# Patient Record
Sex: MALE | Race: WHITE | NOT HISPANIC OR LATINO | Employment: OTHER | URBAN - NONMETROPOLITAN AREA
[De-identification: names, ages, dates, MRNs, and addresses within clinical notes are randomized per-mention and may not be internally consistent; named-entity substitution may affect disease eponyms.]

---

## 2017-03-03 ENCOUNTER — HOSPITAL ENCOUNTER (EMERGENCY)
Facility: HOSPITAL | Age: 67
Discharge: HOME | End: 2017-03-03
Attending: EMERGENCY MEDICINE | Admitting: EMERGENCY MEDICINE
Payer: MEDICARE

## 2017-03-03 ENCOUNTER — APPOINTMENT (EMERGENCY)
Dept: RADIOLOGY | Facility: HOSPITAL | Age: 67
End: 2017-03-03
Payer: MEDICARE

## 2017-03-03 VITALS
HEART RATE: 77 BPM | WEIGHT: 212 LBS | SYSTOLIC BLOOD PRESSURE: 162 MMHG | TEMPERATURE: 97 F | RESPIRATION RATE: 20 BRPM | OXYGEN SATURATION: 96 % | DIASTOLIC BLOOD PRESSURE: 87 MMHG

## 2017-03-03 DIAGNOSIS — M25.571 ACUTE RIGHT ANKLE PAIN: Primary | ICD-10-CM

## 2017-03-03 LAB — URATE SERPL-MCNC: 9.2 MG/DL (ref 4.2–8)

## 2017-03-03 PROCEDURE — 99284 EMERGENCY DEPT VISIT MOD MDM: CPT

## 2017-03-03 PROCEDURE — 36415 COLL VENOUS BLD VENIPUNCTURE: CPT | Performed by: EMERGENCY MEDICINE

## 2017-03-03 PROCEDURE — 73610 X-RAY EXAM OF ANKLE: CPT

## 2017-03-03 PROCEDURE — 84550 ASSAY OF BLOOD/URIC ACID: CPT | Performed by: EMERGENCY MEDICINE

## 2017-03-03 RX ORDER — GABAPENTIN 100 MG/1
600 CAPSULE ORAL DAILY
COMMUNITY

## 2017-03-03 RX ORDER — LISINOPRIL 10 MG/1
10 TABLET ORAL DAILY
COMMUNITY

## 2017-03-03 RX ORDER — NAPROXEN 250 MG/1
500 TABLET ORAL ONCE
Status: COMPLETED | OUTPATIENT
Start: 2017-03-03 | End: 2017-03-03

## 2017-03-03 RX ORDER — RANITIDINE 150 MG/1
150 CAPSULE ORAL DAILY PRN
COMMUNITY

## 2017-03-03 RX ORDER — LEVOTHYROXINE SODIUM 25 UG/1
1 CAPSULE ORAL DAILY
COMMUNITY

## 2017-03-03 RX ORDER — COLCHICINE 0.6 MG/1
0.6 TABLET ORAL DAILY
Qty: 30 TABLET | Refills: 0 | Status: SHIPPED | OUTPATIENT
Start: 2017-03-03 | End: 2020-08-27

## 2017-03-03 RX ADMIN — NAPROXEN 500 MG: 250 TABLET ORAL at 07:37

## 2017-08-09 ENCOUNTER — APPOINTMENT (OUTPATIENT)
Dept: PHYSICAL THERAPY | Facility: CLINIC | Age: 67
End: 2017-08-09
Payer: MEDICARE

## 2017-08-09 PROCEDURE — G8978 MOBILITY CURRENT STATUS: HCPCS

## 2017-08-09 PROCEDURE — 97140 MANUAL THERAPY 1/> REGIONS: CPT

## 2017-08-09 PROCEDURE — 97014 ELECTRIC STIMULATION THERAPY: CPT

## 2017-08-09 PROCEDURE — 97162 PT EVAL MOD COMPLEX 30 MIN: CPT

## 2017-08-09 PROCEDURE — G8979 MOBILITY GOAL STATUS: HCPCS

## 2017-08-09 PROCEDURE — 97110 THERAPEUTIC EXERCISES: CPT

## 2017-08-10 ENCOUNTER — APPOINTMENT (OUTPATIENT)
Dept: PHYSICAL THERAPY | Facility: CLINIC | Age: 67
End: 2017-08-10
Payer: MEDICARE

## 2017-08-10 PROCEDURE — 97140 MANUAL THERAPY 1/> REGIONS: CPT

## 2017-08-10 PROCEDURE — 97110 THERAPEUTIC EXERCISES: CPT

## 2017-08-10 PROCEDURE — 97014 ELECTRIC STIMULATION THERAPY: CPT

## 2017-08-16 ENCOUNTER — APPOINTMENT (OUTPATIENT)
Dept: PHYSICAL THERAPY | Facility: CLINIC | Age: 67
End: 2017-08-16
Payer: MEDICARE

## 2017-08-16 PROCEDURE — 97140 MANUAL THERAPY 1/> REGIONS: CPT

## 2017-08-16 PROCEDURE — 97110 THERAPEUTIC EXERCISES: CPT

## 2017-08-16 PROCEDURE — 97014 ELECTRIC STIMULATION THERAPY: CPT

## 2017-08-17 ENCOUNTER — APPOINTMENT (OUTPATIENT)
Dept: PHYSICAL THERAPY | Facility: CLINIC | Age: 67
End: 2017-08-17
Payer: MEDICARE

## 2017-08-17 PROCEDURE — 97140 MANUAL THERAPY 1/> REGIONS: CPT

## 2017-08-17 PROCEDURE — 97014 ELECTRIC STIMULATION THERAPY: CPT

## 2017-08-17 PROCEDURE — 97110 THERAPEUTIC EXERCISES: CPT

## 2017-08-23 ENCOUNTER — APPOINTMENT (OUTPATIENT)
Dept: PHYSICAL THERAPY | Facility: CLINIC | Age: 67
End: 2017-08-23
Payer: MEDICARE

## 2017-08-23 PROCEDURE — 97110 THERAPEUTIC EXERCISES: CPT

## 2017-08-23 PROCEDURE — 97140 MANUAL THERAPY 1/> REGIONS: CPT

## 2017-08-23 PROCEDURE — 97014 ELECTRIC STIMULATION THERAPY: CPT

## 2017-08-24 ENCOUNTER — APPOINTMENT (OUTPATIENT)
Dept: PHYSICAL THERAPY | Facility: CLINIC | Age: 67
End: 2017-08-24
Payer: MEDICARE

## 2017-08-24 PROCEDURE — 97110 THERAPEUTIC EXERCISES: CPT

## 2017-08-24 PROCEDURE — 97014 ELECTRIC STIMULATION THERAPY: CPT

## 2017-08-24 PROCEDURE — 97140 MANUAL THERAPY 1/> REGIONS: CPT

## 2017-08-28 ENCOUNTER — APPOINTMENT (OUTPATIENT)
Dept: PHYSICAL THERAPY | Facility: CLINIC | Age: 67
End: 2017-08-28
Payer: MEDICARE

## 2017-08-28 PROCEDURE — 97014 ELECTRIC STIMULATION THERAPY: CPT

## 2017-08-28 PROCEDURE — 97110 THERAPEUTIC EXERCISES: CPT

## 2017-08-28 PROCEDURE — 97140 MANUAL THERAPY 1/> REGIONS: CPT

## 2017-08-30 ENCOUNTER — APPOINTMENT (OUTPATIENT)
Dept: PHYSICAL THERAPY | Facility: CLINIC | Age: 67
End: 2017-08-30
Payer: MEDICARE

## 2017-08-31 ENCOUNTER — APPOINTMENT (OUTPATIENT)
Dept: PHYSICAL THERAPY | Facility: CLINIC | Age: 67
End: 2017-08-31
Payer: MEDICARE

## 2017-08-31 PROCEDURE — 97110 THERAPEUTIC EXERCISES: CPT

## 2017-08-31 PROCEDURE — 97014 ELECTRIC STIMULATION THERAPY: CPT

## 2017-08-31 PROCEDURE — 97140 MANUAL THERAPY 1/> REGIONS: CPT

## 2017-09-06 ENCOUNTER — APPOINTMENT (OUTPATIENT)
Dept: PHYSICAL THERAPY | Facility: CLINIC | Age: 67
End: 2017-09-06
Payer: MEDICARE

## 2017-09-06 PROCEDURE — 97140 MANUAL THERAPY 1/> REGIONS: CPT

## 2017-09-06 PROCEDURE — 97014 ELECTRIC STIMULATION THERAPY: CPT

## 2017-09-07 ENCOUNTER — APPOINTMENT (OUTPATIENT)
Dept: PHYSICAL THERAPY | Facility: CLINIC | Age: 67
End: 2017-09-07
Payer: MEDICARE

## 2017-09-07 PROCEDURE — 97014 ELECTRIC STIMULATION THERAPY: CPT

## 2017-09-07 PROCEDURE — G8980 MOBILITY D/C STATUS: HCPCS

## 2017-09-07 PROCEDURE — 97140 MANUAL THERAPY 1/> REGIONS: CPT

## 2017-09-07 PROCEDURE — 97110 THERAPEUTIC EXERCISES: CPT

## 2017-09-07 PROCEDURE — G8979 MOBILITY GOAL STATUS: HCPCS

## 2017-11-21 ENCOUNTER — APPOINTMENT (OUTPATIENT)
Dept: PHYSICAL THERAPY | Facility: CLINIC | Age: 67
End: 2017-11-21
Payer: MEDICARE

## 2017-11-21 PROCEDURE — G8991 OTHER PT/OT GOAL STATUS: HCPCS

## 2017-11-21 PROCEDURE — 97110 THERAPEUTIC EXERCISES: CPT

## 2017-11-21 PROCEDURE — 97535 SELF CARE MNGMENT TRAINING: CPT

## 2017-11-21 PROCEDURE — G8990 OTHER PT/OT CURRENT STATUS: HCPCS

## 2017-11-21 PROCEDURE — 97161 PT EVAL LOW COMPLEX 20 MIN: CPT

## 2017-11-21 PROCEDURE — 97140 MANUAL THERAPY 1/> REGIONS: CPT

## 2017-11-22 ENCOUNTER — APPOINTMENT (OUTPATIENT)
Dept: PHYSICAL THERAPY | Facility: CLINIC | Age: 67
End: 2017-11-22
Payer: MEDICARE

## 2017-11-22 PROCEDURE — 97110 THERAPEUTIC EXERCISES: CPT

## 2017-11-22 PROCEDURE — 97140 MANUAL THERAPY 1/> REGIONS: CPT

## 2017-11-27 ENCOUNTER — APPOINTMENT (OUTPATIENT)
Dept: PHYSICAL THERAPY | Facility: CLINIC | Age: 67
End: 2017-11-27
Payer: MEDICARE

## 2017-11-27 PROCEDURE — 97140 MANUAL THERAPY 1/> REGIONS: CPT

## 2017-11-27 PROCEDURE — 97014 ELECTRIC STIMULATION THERAPY: CPT

## 2017-11-27 PROCEDURE — 97150 GROUP THERAPEUTIC PROCEDURES: CPT

## 2018-09-05 ENCOUNTER — EVALUATION (OUTPATIENT)
Dept: PHYSICAL THERAPY | Facility: CLINIC | Age: 68
End: 2018-09-05
Payer: MEDICARE

## 2018-09-05 DIAGNOSIS — M72.2 PLANTAR FASCIAL FIBROMATOSIS: Primary | ICD-10-CM

## 2018-09-05 PROCEDURE — 97535 SELF CARE MNGMENT TRAINING: CPT | Performed by: PHYSICAL THERAPIST

## 2018-09-05 PROCEDURE — G8978 MOBILITY CURRENT STATUS: HCPCS | Performed by: PHYSICAL THERAPIST

## 2018-09-05 PROCEDURE — G8979 MOBILITY GOAL STATUS: HCPCS | Performed by: PHYSICAL THERAPIST

## 2018-09-05 PROCEDURE — 97140 MANUAL THERAPY 1/> REGIONS: CPT | Performed by: PHYSICAL THERAPIST

## 2018-09-05 PROCEDURE — 97530 THERAPEUTIC ACTIVITIES: CPT | Performed by: PHYSICAL THERAPIST

## 2018-09-05 PROCEDURE — 97161 PT EVAL LOW COMPLEX 20 MIN: CPT | Performed by: PHYSICAL THERAPIST

## 2018-09-05 NOTE — LETTER
2018    DO Mason [address] [facility]    Patient: Raquel Lewis   YOB: 1950   Date of Visit: 2018     Encounter Diagnosis     ICD-10-CM    1  Plantar fascial fibromatosis M72 2        Dear Dr Martina Alfaro:    Please review the attached Plan of Care from [facility] Chrin's recent visit  Please verify that you agree therapy should continue by signing the attached document and sending it back to our office  If you have any questions or concerns, please don't hesitate to call  Sincerely,    Mo Champagne, PT      Referring Provider:      I certify that I have read the below Plan of Care and certify the need for these services furnished under this plan of treatment while under my care  DO Gabriel Bush [address] [place] [address]  VIA Facsimile: [phone]          PT Evaluation     Today's date: 2018  Patient name: Rauqel Lewis  : 1950  MRN: [id number]  Referring provider: Sabas Eastman DO  Dx:   Encounter Diagnosis     ICD-10-CM    1  Plantar fascial fibromatosis M72 2                   Assessment  Impairments: abnormal gait, abnormal or restricted ROM, activity intolerance and pain with function    Assessment details: Raquel Lewis is a 70y o  year old male presenting to PT with pain, decreased range of motion, decreased strength, and decreased tolerance to activity  Signs and symptoms are consistent with referring diagnosis of bilateral plantar fasciitis  The existing impairments result in difficulty with all standing activity with characteristic first step pain  Pain worsens with increased time on his feet, but has begun to decrease since initiation of orthotics  Discussed possibility that flat footed gait to compensate for knee pain may have contributed to his pathology  [facility] would benefit from skilled PT services to address these issues and to maximize function  Home exercise provided and all questions answered   Thank you for the referral     Understanding of Dx/Px/POC: good   Prognosis: good    Goals  STG 2-4 weeks  Increase BLE strength 5-10#  Decrease pain to <5/10 with activity  Increase standing/walking tolerance to >30 minutes with out pain  Patient independent with HEP    LTG 6-8 weeks  Decrease pain to <2/10 with activity  Increase single leg stance >30 seconds  Increase standing/walking tolerance to >2-3 hours without exacerbation of symptoms        Plan  Planned modality interventions: cryotherapy  Planned therapy interventions: manual therapy, neuromuscular re-education, therapeutic activities and therapeutic exercise  Frequency: 3x week  Duration in weeks: 4        Subjective Evaluation    History of Present Illness  Mechanism of injury: Tabitha Sauer reports 6 months of worsening plantar pain with insidious onset  Pain is present equally in bilateral feet, but has been improving since he received new orthotics on   He has been compliant with exercises provided at time of initial MD consult  Quality of life: good    Pain  Current pain ratin  At best pain ratin  At worst pain ratin  Location: B plantar fascia  Quality: needle-like, sharp, tight and discomfort  Relieving factors: rest  Aggravating factors: walking, standing and stair climbing  Progression: improved    Treatments  Current treatment: physical therapy  Patient Goals  Patient goals for therapy: decreased pain          Objective     Tenderness   Left Ankle/Foot   Tenderness in the plantar fascia  Right Ankle/Foot   Tenderness in the plantar fascia       Active Range of Motion   Left Ankle/Foot   Dorsiflexion (ke): 4 degrees   Plantar flexion: 30 degrees     Right Ankle/Foot   Dorsiflexion (ke): 14 degrees   Plantar flexion: 35 degrees       Flowsheet Rows      Most Recent Value   PT/OT G-Codes   Current Score  50   Projected Score  63   FOTO information reviewed  Yes   Assessment Type  Evaluation   G code set  Mobility: Walking & Moving Around Mobility: Walking and Moving Around Current Status ()  CK   Mobility: Walking and Moving Around Goal Status ()  CJ        Precautions pending knee replacements    Specialty Daily Treatment Diary         Manual  9-5       IASTM plantar fascia 15'                                       Exercise Diary         Bike        Marbles 1x ea       ABC, abc 1x ea       Ankle pumps        [facility] wipers        Towel crunches 20x ea       Fitter Cw, CCW        TB PRE        Windlass stretch 30"x3                                                                                               Modalities

## 2018-09-05 NOTE — PROGRESS NOTES
PT Evaluation     Today's date: 2018  Patient name: Tashi Nichols  : 1950  MRN: [id number]  Referring provider: Millicent Kim DO  Dx:   Encounter Diagnosis     ICD-10-CM    1  Plantar fascial fibromatosis M72 2                   Assessment  Impairments: abnormal gait, abnormal or restricted ROM, activity intolerance and pain with function    Assessment details: Tashi Nichols is a 70y o  year old male presenting to PT with pain, decreased range of motion, decreased strength, and decreased tolerance to activity  Signs and symptoms are consistent with referring diagnosis of bilateral plantar fasciitis  The existing impairments result in difficulty with all standing activity with characteristic first step pain  Pain worsens with increased time on his feet, but has begun to decrease since initiation of orthotics  Discussed possibility that flat footed gait to compensate for knee pain may have contributed to his pathology   would benefit from skilled PT services to address these issues and to maximize function  Home exercise provided and all questions answered  Thank you for the referral     Understanding of Dx/Px/POC: good   Prognosis: good    Goals  STG 2-4 weeks  Increase BLE strength 5-10#  Decrease pain to <5/10 with activity  Increase standing/walking tolerance to >30 minutes with out pain  Patient independent with HEP    LTG 6-8 weeks  Decrease pain to <2/10 with activity  Increase single leg stance >30 seconds  Increase standing/walking tolerance to >2-3 hours without exacerbation of symptoms        Plan  Planned modality interventions: cryotherapy  Planned therapy interventions: manual therapy, neuromuscular re-education, therapeutic activities and therapeutic exercise  Frequency: 3x week  Duration in weeks: 4        Subjective Evaluation    History of Present Illness  Mechanism of injury:  reports 6 months of worsening plantar pain with insidious onset    Pain is present equally in bilateral feet, but has been improving since he received new orthotics on   He has been compliant with exercises provided at time of initial MD consult  Quality of life: good    Pain  Current pain ratin  At best pain ratin  At worst pain ratin  Location: B plantar fascia  Quality: needle-like, sharp, tight and discomfort  Relieving factors: rest  Aggravating factors: walking, standing and stair climbing  Progression: improved    Treatments  Current treatment: physical therapy  Patient Goals  Patient goals for therapy: decreased pain          Objective     Tenderness   Left Ankle/Foot   Tenderness in the plantar fascia  Right Ankle/Foot   Tenderness in the plantar fascia       Active Range of Motion   Left Ankle/Foot   Dorsiflexion (ke): 4 degrees   Plantar flexion: 30 degrees     Right Ankle/Foot   Dorsiflexion (ke): 14 degrees   Plantar flexion: 35 degrees       Flowsheet Rows      Most Recent Value   PT/OT G-Codes   Current Score  50   Projected Score  63   FOTO information reviewed  Yes   Assessment Type  Evaluation   G code set  Mobility: Walking & Moving Around   Mobility: Walking and Moving Around Current Status ()  CK   Mobility: Walking and Moving Around Goal Status ()  CJ        Precautions pending knee replacements    Specialty Daily Treatment Diary         Manual  9-5       IASTM plantar fascia 15'                                       Exercise Diary         Bike        Marbles 1x ea       ABC, abc 1x ea       Ankle pumps        Windshield wipers        Towel crunches 20x ea       Fitter Cw, CCW        TB PRE        Windlass stretch 30"x3                                                                                               Modalities

## 2018-09-06 ENCOUNTER — OFFICE VISIT (OUTPATIENT)
Dept: PHYSICAL THERAPY | Facility: CLINIC | Age: 68
End: 2018-09-06
Payer: MEDICARE

## 2018-09-06 ENCOUNTER — TRANSCRIBE ORDERS (OUTPATIENT)
Dept: PHYSICAL THERAPY | Facility: CLINIC | Age: 68
End: 2018-09-06

## 2018-09-06 DIAGNOSIS — M72.2 PLANTAR FASCIAL FIBROMATOSIS: Primary | ICD-10-CM

## 2018-09-06 NOTE — PROGRESS NOTES
Daily Note     Today's date: 2018  Patient name: Carmella Villarreal  : 1950  MRN: [id number]  Referring provider: Marika Pittman DO  Dx:            A user error has taken place: encounter opened in error, closed for administrative reasons

## 2018-09-07 ENCOUNTER — OFFICE VISIT (OUTPATIENT)
Dept: PHYSICAL THERAPY | Facility: CLINIC | Age: 68
End: 2018-09-07
Payer: MEDICARE

## 2018-09-07 ENCOUNTER — APPOINTMENT (OUTPATIENT)
Dept: PHYSICAL THERAPY | Facility: CLINIC | Age: 68
End: 2018-09-07
Payer: MEDICARE

## 2018-09-07 DIAGNOSIS — M72.2 PLANTAR FASCIAL FIBROMATOSIS: Primary | ICD-10-CM

## 2018-09-07 PROCEDURE — 97110 THERAPEUTIC EXERCISES: CPT

## 2018-09-07 PROCEDURE — 97112 NEUROMUSCULAR REEDUCATION: CPT

## 2018-09-07 PROCEDURE — 97140 MANUAL THERAPY 1/> REGIONS: CPT

## 2018-09-07 NOTE — PROGRESS NOTES
Daily Note     Today's date: 2018  Patient name: Shruti Ugarte  : 1950  MRN: [id number]  Referring provider: Darin Baez DO  Dx:   Encounter Diagnosis     ICD-10-CM    1  Plantar fascial fibromatosis M72 2                   Subjective: Patient reports his plantar area of feet are sore today  He reports orthotics in his shoes  Objective: See treatment diary below  Assessment: Tolerated treatment well  Patient had relief with IASTM and cross friction MT  He will benefit from continued services to decrease plantar tightness and increase ROM  Plan: Continue per plan of care       Precautions pending knee replacements     Specialty Daily Treatment Diary            Manual  9-5  9-7         IASTM plantar fascia 15'  15'                                                                 Exercise Diary              Bike    L 5 10'         Marbles 1x ea  1 x ea         ABC, abc 1x ea  1x ea30"x3         Ankle pumps             Windshield wipers             Towel crunches 20x ea  20x ea         Fitter Cw, CCW             TB PRE             Windlass stretch 30"x3  30"x3                                                                                                                                                                   Modalities

## 2018-09-10 ENCOUNTER — OFFICE VISIT (OUTPATIENT)
Dept: PHYSICAL THERAPY | Facility: CLINIC | Age: 68
End: 2018-09-10
Payer: MEDICARE

## 2018-09-10 DIAGNOSIS — M72.2 PLANTAR FASCIAL FIBROMATOSIS: Primary | ICD-10-CM

## 2018-09-10 PROCEDURE — 97140 MANUAL THERAPY 1/> REGIONS: CPT

## 2018-09-10 PROCEDURE — 97110 THERAPEUTIC EXERCISES: CPT

## 2018-09-10 NOTE — PROGRESS NOTES
Daily Note     Today's date: 9/10/2018  Patient name: Colleen Huang  : 1950  MRN: [id number]  Referring provider: Shira Reeder DO  Dx:   Encounter Diagnosis     ICD-10-CM    1  Plantar fascial fibromatosis M72 2                   Subjective: Patient reports compliance with the HEP/stretching program  He continues to have morning pain though the intensity is decreased  Objective: See treatment diary below      Assessment: Tolerated treatment well  Patient exhibited good technique with therapeutic exercises      Plan: Continue per plan of care       Precautions pending knee replacements     Specialty Daily Treatment Diary            Manual  9-5  9-7  9-10       IAS plantar fascia 15'  15'  15'                                                               Exercise Diary              Bike    L 5 10'  L 5 10'       Marbles 1x ea  1 x ea  1X ea       ABC, abc 1x ea  1x ea30"x3  1X ea 30" X3       Ankle pumps             Windshield wipers             Towel crunches 20x ea  20x ea  20X       Fitter Cw, CCW             TB PRE             Windlass stretch 30"x3  30"x3  30" X3                                                                                                                                                                 Modalities

## 2018-09-12 ENCOUNTER — OFFICE VISIT (OUTPATIENT)
Dept: PHYSICAL THERAPY | Facility: CLINIC | Age: 68
End: 2018-09-12
Payer: MEDICARE

## 2018-09-12 DIAGNOSIS — M72.2 PLANTAR FASCIAL FIBROMATOSIS: Primary | ICD-10-CM

## 2018-09-12 PROCEDURE — 97112 NEUROMUSCULAR REEDUCATION: CPT

## 2018-09-12 PROCEDURE — 97140 MANUAL THERAPY 1/> REGIONS: CPT

## 2018-09-12 NOTE — PROGRESS NOTES
Daily Note     Today's date: 2018  Patient name: Mel Ram  : 1950  MRN: [id number]  Referring provider: Ken Medrano DO  Dx:   Encounter Diagnosis     ICD-10-CM    1  Plantar fascial fibromatosis M72 2                   Subjective: Patient reports his pain and tightness in his B Plantar areas have decreased  He is compliant with HEP and is continuing to wear orthotics in his shoes  His feet get "tired" on days he isn't at therapy  Objective: See treatment diary below  Assessment: Tolerated treatment well  Patient has relief with Cross friction and IASTM  He will benefit from continued services to decrease plantar fasciitis symptoms and plain in B feet  Plan: Continue per plan of care       Precautions: pending knee replacements     Specialty Daily Treatment Diary            Manual  9-5  9-7  9-10  9-12     IASTM plantar fascia 13'  15'  15'  15'     Cross friction B plantar Fascia        5'                                               Exercise Diary              Bike    L 5 10'  L 5 10'  L 5 10'     Marbles 1x ea  1 x ea  1X ea  1x ea     ABC, abc 1x ea  1x ea30"x3  1X ea   1x ea     Ankle pumps             [facility] wipers             Towel crunches 20x ea  20x ea  20X 20x     Fitter Cw, CCW             TB PRE             Windlass stretch 30"x3  30"x3  30" X3  30"x3                                                                                                                                                               Modalities

## 2018-09-14 ENCOUNTER — OFFICE VISIT (OUTPATIENT)
Dept: PHYSICAL THERAPY | Facility: CLINIC | Age: 68
End: 2018-09-14
Payer: MEDICARE

## 2018-09-14 DIAGNOSIS — M72.2 PLANTAR FASCIAL FIBROMATOSIS: Primary | ICD-10-CM

## 2018-09-14 PROCEDURE — 97112 NEUROMUSCULAR REEDUCATION: CPT

## 2018-09-14 PROCEDURE — 97140 MANUAL THERAPY 1/> REGIONS: CPT

## 2018-09-14 NOTE — PROGRESS NOTES
Daily Note     Today's date: 2018  Patient name: Usama Colbert  : 1950  MRN: [id number]  Referring provider: Dominique Smith DO  Dx:   Encounter Diagnosis     ICD-10-CM    1  Plantar fascial fibromatosis M72 2                   Subjective: Patient reports less tightness and soreness in his B plantar fascia  Objective: See treatment diary below  Assessment: Tolerated treatment well  Patient demonstrates relief with IASTM and cross friction MT to B plantar area  Plan: Continue per plan of care       Precautions: pending knee replacements     Specialty Daily Treatment Diary            Manual  9-5  9-7  9-10  9-12  9-14   IASTM plantar fascia 13'  15'  15'  15'  15'   Cross friction B plantar Fascia        5'  5'                                             Exercise Diary              Bike    L 5 8'  L 5 10'  L 5 10'  L 5 10'   Marbles 1x ea  1 x ea  1X ea  1x ea  2x ea   ABC, abc 1x ea  1x ea30"x3  1X ea   1x ea     Ankle pumps             Windield wipers             Towel crunches 20x ea  20x ea  20X 20x  20x   Fitter Cw, CCW             TB PRE             Windlass stretch 30"x3  30"x3  30" X3  30"x3  30"x3                                                                                                                                                             Modalities

## 2018-09-17 ENCOUNTER — OFFICE VISIT (OUTPATIENT)
Dept: PHYSICAL THERAPY | Facility: CLINIC | Age: 68
End: 2018-09-17
Payer: MEDICARE

## 2018-09-17 DIAGNOSIS — M72.2 PLANTAR FASCIAL FIBROMATOSIS: Primary | ICD-10-CM

## 2018-09-17 PROCEDURE — 97112 NEUROMUSCULAR REEDUCATION: CPT

## 2018-09-17 PROCEDURE — 97140 MANUAL THERAPY 1/> REGIONS: CPT

## 2018-09-17 PROCEDURE — 97110 THERAPEUTIC EXERCISES: CPT

## 2018-09-17 NOTE — PROGRESS NOTES
Daily Note     Today's date: 2018  Patient name: Leland Chang  : 1950  MRN: [id number]  Referring provider: Gisselle Garcia DO  Dx:   Encounter Diagnosis     ICD-10-CM    1  Plantar fascial fibromatosis M72 2                   Subjective: Patient reports that his B feet are "tired" by the end of the day and may be caused from wearing his orthotics  His plantar fasciitis has decreased since initial visit  Objective: See treatment diary below  Incorporated tandem/SLS into program today  Assessment: Tolerated treatment well  Patient has relief with IASTM and MT cross friction  Plan: Continue per plan of care       Precautions: pending knee replacements     Specialty Daily Treatment Diary            Manual  -17  9-7  9-10  9-12  9-14   IASTM plantar fascia 13'  15'  15'  15'  15'   Cross friction B plantar Fascia  5'      5'  5'                                             Exercise Diary              Bike  L 5 8'  L 5 10'  L 5 10'  L 5 10'  L 5 10'   Marbles 2x ea  1 x ea  1X ea  1x ea  2x ea   ABC, abc 1x ea  1x ea  1X ea   1x ea     Ankle pumps            [facility] wipers            Towel crunches 20x ea  20x ea  20X 20x  20x   Fitter Cw, CCW            TB PRE            Windlass stretch 30"x3  30"x3  30" X3  30"x3  30"x3   Tandem/SLS airex 30"x3                                                                                                                                                       Modalities

## 2018-09-19 ENCOUNTER — OFFICE VISIT (OUTPATIENT)
Dept: PHYSICAL THERAPY | Facility: CLINIC | Age: 68
End: 2018-09-19
Payer: MEDICARE

## 2018-09-19 DIAGNOSIS — M72.2 PLANTAR FASCIAL FIBROMATOSIS: Primary | ICD-10-CM

## 2018-09-19 PROCEDURE — 97112 NEUROMUSCULAR REEDUCATION: CPT

## 2018-09-19 PROCEDURE — 97110 THERAPEUTIC EXERCISES: CPT

## 2018-09-19 PROCEDURE — 97140 MANUAL THERAPY 1/> REGIONS: CPT

## 2018-09-19 NOTE — PROGRESS NOTES
Daily Note     Today's date: 2018  Patient name: Lashay Pathak  : 1950  MRN: [id number]  Referring provider: Claudetta Felix, DO  Dx:   Encounter Diagnosis     ICD-10-CM    1  Plantar fascial fibromatosis M72 2                   Subjective: Patient has no complaints, his B plantar fascia does not have pain  Objective: See treatment diary below  Assessment: Tolerated treatment well  Patient has relief with IASTM and cross friction MT  Plan: Potential discharge next visit      Precautions: pending knee replacements     Specialty Daily Treatment Diary            Manual    9-10  9-12  9-14   IASTM plantar fascia 15' 15'  15'  15'  15'   Cross friction B plantar Fascia  5' 5'    5'  5'                                          Exercise Diary             Bike  L 5 8' L 5 10'  L 5 10'  L 5 10'  L 5 10'   Marbles 2x ea 2xea  1X ea  1x ea  2x ea   ABC, abc 1x ea 1x ea  1X ea   1x ea     Ankle pumps            Windshield wipers            Towel crunches 20x ea 20x ea  20X 20x  20x   Fitter Cw, CCW            TB PRE            Windlass stretch 30"x3 30"x3  30" X3  30"x3  30"x3   Tandem/SLS airex 30"x3 30"x3                                                                                                                                             Modalities

## 2018-09-21 ENCOUNTER — OFFICE VISIT (OUTPATIENT)
Dept: PHYSICAL THERAPY | Facility: CLINIC | Age: 68
End: 2018-09-21
Payer: MEDICARE

## 2018-09-21 DIAGNOSIS — M72.2 PLANTAR FASCIAL FIBROMATOSIS: Primary | ICD-10-CM

## 2018-09-21 PROCEDURE — 97535 SELF CARE MNGMENT TRAINING: CPT

## 2018-09-21 PROCEDURE — 97110 THERAPEUTIC EXERCISES: CPT

## 2018-09-21 NOTE — PROGRESS NOTES
Daily Note     Today's date: 2018  Patient name: Kimberly Arzola  : 1950  MRN: [id number]  Referring provider: Redd Landeros DO  Dx:   Encounter Diagnosis     ICD-10-CM    1  Plantar fascial fibromatosis M72 2                   Subjective: Patient reports no increase in pain  He feels he has made good progress, his B plantar areas have no symptoms  Objective: See treatment diary below  Assessment: Tolerated treatment well  Patient had no change in symptoms  Patient to be discharged after today  Plan: Patient to be discharged today as per PT      Precautions: pending knee replacements     Specialty Daily Treatment Diary            Manual  14   IASTM plantar fascia 15' 15' declined  15'  15'   Cross friction B plantar Fascia  5' 5' declined  5'  5'                                          Exercise Diary             Bike  L 5 8' L 5 10' L 5 15'  L 5 10'  L 5 10'   Marbles 2x ea 2xea   1x ea  2x ea   ABC, abc 1x ea 1x ea   1x ea     Ankle pumps            Windield wipers            Towel crunches 20x ea 20x ea  20x  20x   Fitter Cw, CCW            TB PRE            Windlass stretch 30"x3 30"x3   30"x3  30"x3   Tandem/SLS airex 30"x3 30"x3                                                                                                                                                  Modalities

## 2018-09-25 NOTE — PROGRESS NOTES
PT Discharge    Today's date: 2018  Patient name: Meri Cochran  : 1950  MRN: [id number]  Referring provider: Aguila Gibson DO  Dx:   Encounter Diagnosis     ICD-10-CM    1  Plantar fascial fibromatosis M72 2        Start Time: 1000  Stop Time: 5508  Total time in clinic (min): 25 minutes      DC due to full resolution of symptoms, no further skilled PT indicated  Assessment  Impairments: abnormal gait, abnormal or restricted ROM, activity intolerance and pain with function    Assessment details: Chris Mancera has progressed well towards all goals  He now reports no pain in his feet through a combination of orthotics and physical therapy  At this time all goals met, no further PT indicated  Understanding of Dx/Px/POC: good   Prognosis: good    Goals  STG 2-4 weeks  Increase BLE strength 5-10# - met  Decrease pain to <5/10 with activity - met  Increase standing/walking tolerance to >30 minutes with out pain - met  Patient independent with HEP - met    LTG 6-8 weeks  Decrease pain to <2/10 with activity - met  Increase single leg stance >30 seconds - met  Increase standing/walking tolerance to >2-3 hours without exacerbation of symptoms - met        Plan  Planned modality interventions: cryotherapy        Subjective Evaluation    History of Present Illness  Mechanism of injury: Chris Mancera reports resolution of all symptoms  Quality of life: good    Pain  Current pain ratin  At best pain ratin  At worst pain rating: 3  Location: B plantar fascia  Quality: tight and discomfort  Relieving factors: rest  Aggravating factors: walking, standing and stair climbing  Progression: improved    Treatments  Current treatment: physical therapy        Objective     Tenderness   Left Ankle/Foot   Tenderness in the plantar fascia  Right Ankle/Foot   Tenderness in the plantar fascia       Active Range of Motion   Left Ankle/Foot   Dorsiflexion (ke): 4 degrees   Plantar flexion: 30 degrees     Right Ankle/Foot Dorsiflexion (ke): 14 degrees   Plantar flexion: 35 degrees       Flowsheet Rows      Most Recent Value   PT/OT G-Codes   Current Score  73   Projected Score  63        Precautions pending knee replacements    Specialty Daily Treatment Diary         Manual  9-5       IASTM plantar fascia 15'                                       Exercise Diary         Bike        Marbles 1x ea       ABC, abc 1x ea       Ankle pumps        Windshield wipers        Towel crunches 20x ea       Fitter Cw, CCW        TB PRE        Windlass stretch 30"x3                                                                                               Modalities

## 2018-10-29 ENCOUNTER — ANESTHESIA EVENT (OUTPATIENT)
Dept: PERIOP | Facility: HOSPITAL | Age: 68
DRG: 470 | End: 2018-10-29
Payer: MEDICARE

## 2018-10-29 RX ORDER — SODIUM CHLORIDE 9 MG/ML
125 INJECTION, SOLUTION INTRAVENOUS CONTINUOUS
Status: CANCELLED | OUTPATIENT
Start: 2018-11-06

## 2018-10-30 ENCOUNTER — HOSPITAL ENCOUNTER (OUTPATIENT)
Dept: RADIOLOGY | Facility: HOSPITAL | Age: 68
Discharge: HOME | End: 2018-10-30
Attending: ORTHOPAEDIC SURGERY
Payer: MEDICARE

## 2018-10-30 ENCOUNTER — TRANSCRIBE ORDERS (OUTPATIENT)
Dept: ADMINISTRATIVE | Facility: HOSPITAL | Age: 68
End: 2018-10-30

## 2018-10-30 ENCOUNTER — APPOINTMENT (OUTPATIENT)
Dept: PREADMISSION TESTING | Facility: HOSPITAL | Age: 68
End: 2018-10-30
Payer: MEDICARE

## 2018-10-30 ENCOUNTER — HOSPITAL ENCOUNTER (OUTPATIENT)
Dept: NON INVASIVE DIAGNOSTICS | Facility: HOSPITAL | Age: 68
Discharge: HOME | End: 2018-10-30
Attending: ORTHOPAEDIC SURGERY
Payer: MEDICARE

## 2018-10-30 ENCOUNTER — APPOINTMENT (OUTPATIENT)
Dept: LAB | Facility: HOSPITAL | Age: 68
End: 2018-10-30
Attending: ORTHOPAEDIC SURGERY
Payer: MEDICARE

## 2018-10-30 DIAGNOSIS — M17.12 OSTEOARTHRITIS OF LEFT KNEE, UNSPECIFIED OSTEOARTHRITIS TYPE: Primary | ICD-10-CM

## 2018-10-30 DIAGNOSIS — Z01.818 PREOP EXAMINATION: ICD-10-CM

## 2018-10-30 DIAGNOSIS — M17.12 OSTEOARTHRITIS OF LEFT KNEE, UNSPECIFIED OSTEOARTHRITIS TYPE: ICD-10-CM

## 2018-10-30 LAB
ALBUMIN SERPL BCP-MCNC: 3.9 G/DL (ref 3.5–5)
ALP SERPL-CCNC: 59 U/L (ref 46–116)
ALT SERPL W P-5'-P-CCNC: 24 U/L (ref 12–78)
ANION GAP SERPL CALCULATED.3IONS-SCNC: 10 MMOL/L (ref 4–13)
AST SERPL W P-5'-P-CCNC: 20 U/L (ref 5–45)
ATRIAL RATE: 63 BPM
BACTERIA UR QL AUTO: ABNORMAL /HPF
BASOPHILS # BLD AUTO: 0.07 THOUSANDS/ΜL (ref 0–0.1)
BASOPHILS NFR BLD AUTO: 1 % (ref 0–1)
BILIRUB SERPL-MCNC: 0.75 MG/DL (ref 0.2–1)
BILIRUB UR QL STRIP: NEGATIVE
BUN SERPL-MCNC: 11 MG/DL (ref 5–25)
CALCIUM SERPL-MCNC: 9.1 MG/DL (ref 8.3–10.1)
CHLORIDE SERPL-SCNC: 100 MMOL/L (ref 100–108)
CLARITY UR: CLEAR
CO2 SERPL-SCNC: 28 MMOL/L (ref 21–32)
COLOR UR: YELLOW
CREAT SERPL-MCNC: 1.04 MG/DL (ref 0.6–1.3)
EOSINOPHIL # BLD AUTO: 0.15 THOUSAND/ΜL (ref 0–0.61)
EOSINOPHIL NFR BLD AUTO: 2 % (ref 0–6)
ERYTHROCYTE [DISTWIDTH] IN BLOOD BY AUTOMATED COUNT: 12.4 % (ref 11.6–15.1)
GFR SERPL CREATININE-BSD FRML MDRD: 73 ML/MIN/1.73SQ M
GLUCOSE SERPL-MCNC: 95 MG/DL (ref 65–140)
GLUCOSE UR STRIP-MCNC: NEGATIVE MG/DL
HCT VFR BLD AUTO: 48.3 % (ref 36.5–49.3)
HGB BLD-MCNC: 16.3 G/DL (ref 12–17)
HGB UR QL STRIP.AUTO: ABNORMAL
IMM GRANULOCYTES # BLD AUTO: 0.03 THOUSAND/UL (ref 0–0.2)
IMM GRANULOCYTES NFR BLD AUTO: 0 % (ref 0–2)
KETONES UR STRIP-MCNC: NEGATIVE MG/DL
LEUKOCYTE ESTERASE UR QL STRIP: NEGATIVE
LYMPHOCYTES # BLD AUTO: 1.93 THOUSANDS/ΜL (ref 0.6–4.47)
LYMPHOCYTES NFR BLD AUTO: 25 % (ref 14–44)
MCH RBC QN AUTO: 30.5 PG (ref 26.8–34.3)
MCHC RBC AUTO-ENTMCNC: 33.7 G/DL (ref 31.4–37.4)
MCV RBC AUTO: 90 FL (ref 82–98)
MONOCYTES # BLD AUTO: 0.8 THOUSAND/ΜL (ref 0.17–1.22)
MONOCYTES NFR BLD AUTO: 10 % (ref 4–12)
NEUTROPHILS # BLD AUTO: 4.89 THOUSANDS/ΜL (ref 1.85–7.62)
NEUTS SEG NFR BLD AUTO: 62 % (ref 43–75)
NITRITE UR QL STRIP: NEGATIVE
NON-SQ EPI CELLS URNS QL MICRO: ABNORMAL /HPF
NRBC BLD AUTO-RTO: 0 /100 WBCS
P AXIS: 75 DEGREES
PH UR STRIP.AUTO: 6 [PH] (ref 4.5–8)
PLATELET # BLD AUTO: 215 THOUSANDS/UL (ref 149–390)
PMV BLD AUTO: 10.5 FL (ref 8.9–12.7)
POTASSIUM SERPL-SCNC: 3.8 MMOL/L (ref 3.5–5.3)
PR INTERVAL: 170 MS
PROT SERPL-MCNC: 7.6 G/DL (ref 6.4–8.2)
PROT UR STRIP-MCNC: NEGATIVE MG/DL
QRS AXIS: 37 DEGREES
QRSD INTERVAL: 74 MS
QT INTERVAL: 406 MS
QTC INTERVAL: 415 MS
RBC # BLD AUTO: 5.35 MILLION/UL (ref 3.88–5.62)
RBC #/AREA URNS AUTO: ABNORMAL /HPF
SODIUM SERPL-SCNC: 138 MMOL/L (ref 136–145)
SP GR UR STRIP.AUTO: 1.02 (ref 1–1.03)
T WAVE AXIS: 50 DEGREES
UROBILINOGEN UR QL STRIP.AUTO: 0.2 E.U./DL
VENTRICULAR RATE: 63 BPM
WBC # BLD AUTO: 7.87 THOUSAND/UL (ref 4.31–10.16)
WBC #/AREA URNS AUTO: ABNORMAL /HPF

## 2018-10-30 PROCEDURE — 80053 COMPREHEN METABOLIC PANEL: CPT

## 2018-10-30 PROCEDURE — 93010 ELECTROCARDIOGRAM REPORT: CPT | Performed by: INTERNAL MEDICINE

## 2018-10-30 PROCEDURE — 36415 COLL VENOUS BLD VENIPUNCTURE: CPT

## 2018-10-30 PROCEDURE — 81001 URINALYSIS AUTO W/SCOPE: CPT | Performed by: ORTHOPAEDIC SURGERY

## 2018-10-30 PROCEDURE — 85025 COMPLETE CBC W/AUTO DIFF WBC: CPT

## 2018-10-30 PROCEDURE — 71046 X-RAY EXAM CHEST 2 VIEWS: CPT

## 2018-10-30 PROCEDURE — 93005 ELECTROCARDIOGRAM TRACING: CPT

## 2018-10-30 RX ORDER — ALLOPURINOL 100 MG/1
TABLET ORAL
COMMUNITY
Start: 2018-09-24

## 2018-10-30 NOTE — ANESTHESIA PREPROCEDURE EVALUATION
Review of Systems/Medical History  Patient summary reviewed  Chart reviewed  No history of anesthetic complications     Cardiovascular  Hypertension controlled,    Pulmonary  Negative pulmonary ROS        GI/Hepatic    GERD well controlled,             Endo/Other  History of thyroid disease , hypothyroidism,      GYN  Negative gynecology ROS          Hematology  Negative hematology ROS      Musculoskeletal  Back pain (multilevel lumbar fusion 2015) , lumbar pain, Gout,   Arthritis     Neurology  Negative neurology ROS      Psychology   Negative psychology ROS              Physical Exam    Airway    Mallampati score: II  TM Distance: >3 FB  Neck ROM: full     Dental   upper dentures,     Cardiovascular  Rhythm: regular, Rate: normal,     Pulmonary  Pulmonary exam normal Breath sounds clear to auscultation,     Other Findings        Anesthesia Plan  ASA Score- 2     Anesthesia Type- general, spinal and regional with ASA Monitors  Additional Monitors:   Airway Plan:     Comment: Adductor canal block  Discussed possibility of trying spinal, but he is prepared to have GA  Shaun Blackwell Plan Factors-Patient not instructed to abstain from smoking on day of procedure  Patient did not smoke on day of surgery  Induction- intravenous  Postoperative Plan- Plan for postoperative opioid use  Informed Consent- Anesthetic plan and risks discussed with patient

## 2018-10-30 NOTE — PRE-PROCEDURE INSTRUCTIONS
Pre-Surgery Instructions:   Medication Instructions    allopurinol (ZYLOPRIM) 100 mg tablet Patient was instructed by Physician and understands   colchicine (COLCRYS) 0 6 mg tablet Patient was instructed by Physician and understands   gabapentin (NEURONTIN) 100 mg capsule Patient was instructed by Physician and understands   Levothyroxine Sodium 25 MCG CAPS Patient was instructed by Physician and understands   lisinopril (ZESTRIL) 10 mg tablet Patient was instructed by Physician and understands   ranitidine (ZANTAC) 150 MG capsule Patient was instructed by Physician and understands  Seen by Dr Sim Carolina  Patient instructed to take Levothyroxine morning of surgery with sip of water    Instructed re: chlorhexidine showers per hospital policy

## 2018-11-01 ENCOUNTER — APPOINTMENT (OUTPATIENT)
Dept: LAB | Facility: MEDICAL CENTER | Age: 68
End: 2018-11-01
Payer: MEDICARE

## 2018-11-01 ENCOUNTER — TRANSCRIBE ORDERS (OUTPATIENT)
Dept: LAB | Facility: MEDICAL CENTER | Age: 68
End: 2018-11-01

## 2018-11-01 DIAGNOSIS — Z01.89 ENCOUNTER FOR OTHER SPECIFIED SPECIAL EXAMINATIONS: ICD-10-CM

## 2018-11-01 DIAGNOSIS — Z01.818 PRE-OP TESTING: ICD-10-CM

## 2018-11-01 DIAGNOSIS — M17.12 OSTEOARTHRITIS OF LEFT KNEE, UNSPECIFIED OSTEOARTHRITIS TYPE: Primary | ICD-10-CM

## 2018-11-01 DIAGNOSIS — M17.12 OSTEOARTHRITIS OF LEFT KNEE, UNSPECIFIED OSTEOARTHRITIS TYPE: ICD-10-CM

## 2018-11-01 PROCEDURE — 36415 COLL VENOUS BLD VENIPUNCTURE: CPT

## 2018-11-06 ENCOUNTER — ANESTHESIA (OUTPATIENT)
Dept: PERIOP | Facility: HOSPITAL | Age: 68
DRG: 470 | End: 2018-11-06
Payer: MEDICARE

## 2018-11-06 ENCOUNTER — APPOINTMENT (OUTPATIENT)
Dept: RADIOLOGY | Facility: HOSPITAL | Age: 68
DRG: 470 | End: 2018-11-06
Payer: MEDICARE

## 2018-11-06 ENCOUNTER — HOSPITAL ENCOUNTER (INPATIENT)
Facility: HOSPITAL | Age: 68
LOS: 1 days | Discharge: HOME HEALTH | DRG: 470 | End: 2018-11-08
Attending: ORTHOPAEDIC SURGERY | Admitting: ORTHOPAEDIC SURGERY
Payer: MEDICARE

## 2018-11-06 DIAGNOSIS — M17.12 PRIMARY OSTEOARTHRITIS OF LEFT KNEE: Primary | ICD-10-CM

## 2018-11-06 LAB
ABO GROUP BLD: NORMAL
BLD GP AB SCN SERPL QL: NEGATIVE
RH BLD: POSITIVE
SPECIMEN EXPIRATION DATE: NORMAL

## 2018-11-06 PROCEDURE — 97530 THERAPEUTIC ACTIVITIES: CPT

## 2018-11-06 PROCEDURE — 73560 X-RAY EXAM OF KNEE 1 OR 2: CPT

## 2018-11-06 PROCEDURE — 97163 PT EVAL HIGH COMPLEX 45 MIN: CPT

## 2018-11-06 PROCEDURE — C1776 JOINT DEVICE (IMPLANTABLE): HCPCS | Performed by: ORTHOPAEDIC SURGERY

## 2018-11-06 PROCEDURE — 86900 BLOOD TYPING SEROLOGIC ABO: CPT | Performed by: ORTHOPAEDIC SURGERY

## 2018-11-06 PROCEDURE — 0SRD0J9 REPLACEMENT OF LEFT KNEE JOINT WITH SYNTHETIC SUBSTITUTE, CEMENTED, OPEN APPROACH: ICD-10-PCS | Performed by: ORTHOPAEDIC SURGERY

## 2018-11-06 PROCEDURE — 86850 RBC ANTIBODY SCREEN: CPT | Performed by: ORTHOPAEDIC SURGERY

## 2018-11-06 PROCEDURE — C1713 ANCHOR/SCREW BN/BN,TIS/BN: HCPCS | Performed by: ORTHOPAEDIC SURGERY

## 2018-11-06 PROCEDURE — G8979 MOBILITY GOAL STATUS: HCPCS

## 2018-11-06 PROCEDURE — 86901 BLOOD TYPING SEROLOGIC RH(D): CPT | Performed by: ORTHOPAEDIC SURGERY

## 2018-11-06 PROCEDURE — G8978 MOBILITY CURRENT STATUS: HCPCS

## 2018-11-06 DEVICE — SMARTSET HIGH PERFORMANCE MV MEDIUM VISCOSITY BONE CEMENT 40G
Type: IMPLANTABLE DEVICE | Site: KNEE | Status: FUNCTIONAL
Brand: SMARTSET

## 2018-11-06 DEVICE — ATTUNE PATELLA MEDIALIZED ANATOMIC 35MM CEMENTED AOX
Type: IMPLANTABLE DEVICE | Site: PATELLA | Status: FUNCTIONAL
Brand: ATTUNE

## 2018-11-06 DEVICE — ATTUNE KNEE SYSTEM TIBIAL BASE ROTATING PLATFORM SIZE 7 CEMENTED
Type: IMPLANTABLE DEVICE | Site: KNEE | Status: FUNCTIONAL
Brand: ATTUNE

## 2018-11-06 DEVICE — ATTUNE KNEE SYSTEM FEMORAL POSTERIOR STABILIZED SIZE 8 LEFT CEMENTED
Type: IMPLANTABLE DEVICE | Site: KNEE | Status: FUNCTIONAL
Brand: ATTUNE

## 2018-11-06 DEVICE — ATTUNE KNEE SYSTEM TIBIAL INSERT ROTATING PLATFORM POSTERIOR STABILIZED 8 6MM AOX
Type: IMPLANTABLE DEVICE | Site: KNEE | Status: FUNCTIONAL
Brand: ATTUNE

## 2018-11-06 RX ORDER — LISINOPRIL 10 MG/1
10 TABLET ORAL DAILY
Status: DISCONTINUED | OUTPATIENT
Start: 2018-11-07 | End: 2018-11-07

## 2018-11-06 RX ORDER — ROPIVACAINE HYDROCHLORIDE 5 MG/ML
INJECTION, SOLUTION EPIDURAL; INFILTRATION; PERINEURAL AS NEEDED
Status: DISCONTINUED | OUTPATIENT
Start: 2018-11-06 | End: 2018-11-06

## 2018-11-06 RX ORDER — MIDAZOLAM HYDROCHLORIDE 2 MG/ML
SYRUP ORAL AS NEEDED
Status: DISCONTINUED | OUTPATIENT
Start: 2018-11-06 | End: 2018-11-06

## 2018-11-06 RX ORDER — SODIUM CHLORIDE, SODIUM LACTATE, POTASSIUM CHLORIDE, CALCIUM CHLORIDE 600; 310; 30; 20 MG/100ML; MG/100ML; MG/100ML; MG/100ML
100 INJECTION, SOLUTION INTRAVENOUS CONTINUOUS
Status: DISCONTINUED | OUTPATIENT
Start: 2018-11-06 | End: 2018-11-08 | Stop reason: HOSPADM

## 2018-11-06 RX ORDER — FENTANYL CITRATE 50 UG/ML
INJECTION, SOLUTION INTRAMUSCULAR; INTRAVENOUS AS NEEDED
Status: DISCONTINUED | OUTPATIENT
Start: 2018-11-06 | End: 2018-11-06 | Stop reason: SURG

## 2018-11-06 RX ORDER — DOCUSATE SODIUM 100 MG/1
100 CAPSULE, LIQUID FILLED ORAL 2 TIMES DAILY
Status: DISCONTINUED | OUTPATIENT
Start: 2018-11-06 | End: 2018-11-08 | Stop reason: HOSPADM

## 2018-11-06 RX ORDER — FERROUS SULFATE 325(65) MG
325 TABLET ORAL
Status: DISCONTINUED | OUTPATIENT
Start: 2018-11-07 | End: 2018-11-08 | Stop reason: HOSPADM

## 2018-11-06 RX ORDER — HYDROMORPHONE HCL/PF 1 MG/ML
0.5 SYRINGE (ML) INJECTION
Status: COMPLETED | OUTPATIENT
Start: 2018-11-06 | End: 2018-11-06

## 2018-11-06 RX ORDER — ACETAMINOPHEN 325 MG/1
650 TABLET ORAL EVERY 6 HOURS PRN
Status: DISCONTINUED | OUTPATIENT
Start: 2018-11-06 | End: 2018-11-08 | Stop reason: HOSPADM

## 2018-11-06 RX ORDER — PROPOFOL 10 MG/ML
INJECTION, EMULSION INTRAVENOUS CONTINUOUS PRN
Status: DISCONTINUED | OUTPATIENT
Start: 2018-11-06 | End: 2018-11-06 | Stop reason: SURG

## 2018-11-06 RX ORDER — TETRACAINE HCL 10 MG/ML
INJECTION SUBARACHNOID AS NEEDED
Status: DISCONTINUED | OUTPATIENT
Start: 2018-11-06 | End: 2018-11-06 | Stop reason: SURG

## 2018-11-06 RX ORDER — FENTANYL CITRATE/PF 50 MCG/ML
50 SYRINGE (ML) INJECTION
Status: COMPLETED | OUTPATIENT
Start: 2018-11-06 | End: 2018-11-06

## 2018-11-06 RX ORDER — HYDROMORPHONE HCL/PF 1 MG/ML
0.5 SYRINGE (ML) INJECTION EVERY 2 HOUR PRN
Status: DISCONTINUED | OUTPATIENT
Start: 2018-11-06 | End: 2018-11-08 | Stop reason: HOSPADM

## 2018-11-06 RX ORDER — SENNOSIDES 8.6 MG
1 TABLET ORAL DAILY
Status: DISCONTINUED | OUTPATIENT
Start: 2018-11-07 | End: 2018-11-08 | Stop reason: HOSPADM

## 2018-11-06 RX ORDER — ROPIVACAINE HYDROCHLORIDE 5 MG/ML
INJECTION, SOLUTION EPIDURAL; INFILTRATION; PERINEURAL AS NEEDED
Status: DISCONTINUED | OUTPATIENT
Start: 2018-11-06 | End: 2018-11-06 | Stop reason: SURG

## 2018-11-06 RX ORDER — FAMOTIDINE 20 MG/1
20 TABLET, FILM COATED ORAL DAILY
Status: DISCONTINUED | OUTPATIENT
Start: 2018-11-07 | End: 2018-11-08 | Stop reason: HOSPADM

## 2018-11-06 RX ORDER — OXYCODONE HYDROCHLORIDE 5 MG/1
5 TABLET ORAL EVERY 4 HOURS PRN
Status: DISCONTINUED | OUTPATIENT
Start: 2018-11-06 | End: 2018-11-08

## 2018-11-06 RX ORDER — MIDAZOLAM HYDROCHLORIDE 1 MG/ML
INJECTION INTRAMUSCULAR; INTRAVENOUS AS NEEDED
Status: DISCONTINUED | OUTPATIENT
Start: 2018-11-06 | End: 2018-11-06 | Stop reason: SURG

## 2018-11-06 RX ORDER — ONDANSETRON 2 MG/ML
INJECTION INTRAMUSCULAR; INTRAVENOUS AS NEEDED
Status: DISCONTINUED | OUTPATIENT
Start: 2018-11-06 | End: 2018-11-06 | Stop reason: SURG

## 2018-11-06 RX ORDER — DEXAMETHASONE SODIUM PHOSPHATE 4 MG/ML
INJECTION, SOLUTION INTRA-ARTICULAR; INTRALESIONAL; INTRAMUSCULAR; INTRAVENOUS; SOFT TISSUE AS NEEDED
Status: DISCONTINUED | OUTPATIENT
Start: 2018-11-06 | End: 2018-11-06 | Stop reason: SURG

## 2018-11-06 RX ORDER — OXYCODONE HYDROCHLORIDE 10 MG/1
10 TABLET ORAL EVERY 4 HOURS PRN
Status: DISCONTINUED | OUTPATIENT
Start: 2018-11-06 | End: 2018-11-08

## 2018-11-06 RX ORDER — ALLOPURINOL 100 MG/1
100 TABLET ORAL DAILY
Status: DISCONTINUED | OUTPATIENT
Start: 2018-11-07 | End: 2018-11-08 | Stop reason: HOSPADM

## 2018-11-06 RX ORDER — ONDANSETRON 2 MG/ML
4 INJECTION INTRAMUSCULAR; INTRAVENOUS EVERY 8 HOURS PRN
Status: DISCONTINUED | OUTPATIENT
Start: 2018-11-06 | End: 2018-11-08 | Stop reason: HOSPADM

## 2018-11-06 RX ORDER — LEVOTHYROXINE SODIUM 0.03 MG/1
25 TABLET ORAL
Status: DISCONTINUED | OUTPATIENT
Start: 2018-11-07 | End: 2018-11-08 | Stop reason: HOSPADM

## 2018-11-06 RX ORDER — GABAPENTIN 300 MG/1
600 CAPSULE ORAL DAILY
Status: DISCONTINUED | OUTPATIENT
Start: 2018-11-07 | End: 2018-11-08 | Stop reason: HOSPADM

## 2018-11-06 RX ORDER — MAGNESIUM HYDROXIDE 1200 MG/15ML
LIQUID ORAL AS NEEDED
Status: DISCONTINUED | OUTPATIENT
Start: 2018-11-06 | End: 2018-11-06 | Stop reason: HOSPADM

## 2018-11-06 RX ORDER — SODIUM CHLORIDE 9 MG/ML
125 INJECTION, SOLUTION INTRAVENOUS CONTINUOUS
Status: DISCONTINUED | OUTPATIENT
Start: 2018-11-06 | End: 2018-11-08 | Stop reason: HOSPADM

## 2018-11-06 RX ORDER — ONDANSETRON 2 MG/ML
4 INJECTION INTRAMUSCULAR; INTRAVENOUS ONCE AS NEEDED
Status: DISCONTINUED | OUTPATIENT
Start: 2018-11-06 | End: 2018-11-06 | Stop reason: HOSPADM

## 2018-11-06 RX ADMIN — FENTANYL CITRATE 100 MCG: 50 INJECTION, SOLUTION INTRAMUSCULAR; INTRAVENOUS at 11:37

## 2018-11-06 RX ADMIN — SODIUM CHLORIDE, SODIUM LACTATE, POTASSIUM CHLORIDE, AND CALCIUM CHLORIDE 100 ML/HR: .6; .31; .03; .02 INJECTION, SOLUTION INTRAVENOUS at 14:11

## 2018-11-06 RX ADMIN — ROPIVACAINE HYDROCHLORIDE 30 ML: 5 INJECTION, SOLUTION EPIDURAL; INFILTRATION; PERINEURAL at 10:44

## 2018-11-06 RX ADMIN — SODIUM CHLORIDE 125 ML/HR: 0.9 INJECTION, SOLUTION INTRAVENOUS at 10:48

## 2018-11-06 RX ADMIN — PROPOFOL 80 MCG/KG/MIN: 10 INJECTION, EMULSION INTRAVENOUS at 11:53

## 2018-11-06 RX ADMIN — OXYCODONE HYDROCHLORIDE 10 MG: 10 TABLET ORAL at 17:41

## 2018-11-06 RX ADMIN — DOCUSATE SODIUM 100 MG: 100 CAPSULE, LIQUID FILLED ORAL at 17:41

## 2018-11-06 RX ADMIN — SODIUM CHLORIDE 125 ML/HR: 0.9 INJECTION, SOLUTION INTRAVENOUS at 09:01

## 2018-11-06 RX ADMIN — CEFAZOLIN SODIUM 1000 MG: 1 SOLUTION INTRAVENOUS at 22:19

## 2018-11-06 RX ADMIN — HYDROMORPHONE HYDROCHLORIDE 0.5 MG: 1 INJECTION, SOLUTION INTRAMUSCULAR; INTRAVENOUS; SUBCUTANEOUS at 14:55

## 2018-11-06 RX ADMIN — FENTANYL CITRATE 50 MCG: 50 INJECTION, SOLUTION INTRAMUSCULAR; INTRAVENOUS at 13:51

## 2018-11-06 RX ADMIN — DEXAMETHASONE SODIUM PHOSPHATE 4 MG: 4 INJECTION, SOLUTION INTRAMUSCULAR; INTRAVENOUS at 12:00

## 2018-11-06 RX ADMIN — MIDAZOLAM 2 MG: 1 INJECTION INTRAMUSCULAR; INTRAVENOUS at 11:37

## 2018-11-06 RX ADMIN — HYDROMORPHONE HYDROCHLORIDE 0.5 MG: 1 INJECTION, SOLUTION INTRAMUSCULAR; INTRAVENOUS; SUBCUTANEOUS at 14:18

## 2018-11-06 RX ADMIN — ACETAMINOPHEN 650 MG: 325 TABLET, FILM COATED ORAL at 22:19

## 2018-11-06 RX ADMIN — ONDANSETRON HYDROCHLORIDE 4 MG: 2 INJECTION, SOLUTION INTRAVENOUS at 12:00

## 2018-11-06 RX ADMIN — HYDROMORPHONE HYDROCHLORIDE 0.5 MG: 1 INJECTION, SOLUTION INTRAMUSCULAR; INTRAVENOUS; SUBCUTANEOUS at 14:28

## 2018-11-06 RX ADMIN — MIDAZOLAM 2 MG: 1 INJECTION INTRAMUSCULAR; INTRAVENOUS at 10:39

## 2018-11-06 RX ADMIN — FENTANYL CITRATE 50 MCG: 50 INJECTION, SOLUTION INTRAMUSCULAR; INTRAVENOUS at 13:46

## 2018-11-06 RX ADMIN — TETRACAINE HCL 1 ML: 10 INJECTION SUBARACHNOID at 11:50

## 2018-11-06 RX ADMIN — SODIUM CHLORIDE: 0.9 INJECTION, SOLUTION INTRAVENOUS at 12:10

## 2018-11-06 RX ADMIN — HYDROMORPHONE HYDROCHLORIDE 0.5 MG: 1 INJECTION, SOLUTION INTRAMUSCULAR; INTRAVENOUS; SUBCUTANEOUS at 14:43

## 2018-11-06 RX ADMIN — HYDROMORPHONE HYDROCHLORIDE 0.5 MG: 1 INJECTION, SOLUTION INTRAMUSCULAR; INTRAVENOUS; SUBCUTANEOUS at 13:57

## 2018-11-06 RX ADMIN — FENTANYL CITRATE 100 MCG: 50 INJECTION, SOLUTION INTRAMUSCULAR; INTRAVENOUS at 10:39

## 2018-11-06 RX ADMIN — CEFAZOLIN SODIUM 2000 MG: 2 SOLUTION INTRAVENOUS at 11:55

## 2018-11-06 RX ADMIN — SODIUM CHLORIDE, SODIUM LACTATE, POTASSIUM CHLORIDE, AND CALCIUM CHLORIDE 100 ML/HR: .6; .31; .03; .02 INJECTION, SOLUTION INTRAVENOUS at 22:21

## 2018-11-06 RX ADMIN — HYDROMORPHONE HYDROCHLORIDE 0.5 MG: 1 INJECTION, SOLUTION INTRAMUSCULAR; INTRAVENOUS; SUBCUTANEOUS at 14:08

## 2018-11-06 NOTE — PHYSICAL THERAPY NOTE
PT EVALUATION 17:45-18:03:  18 minutes    70 y o     [id number]    Primary osteoarthritis of left knee [M17 12]    Past Medical History:   Diagnosis Date    Arthritis     Basal cell carcinoma in situ of skin     on back    Disease of thyroid gland     hypothyroid    GERD (gastroesophageal reflux disease)     Gout     Gout     Hypertension     Neuropathy     right foot    Spinal stenosis, lumbar     Wears dentures     upper full    Wears hearing aid     both ears         Past Surgical History:   Procedure Laterality Date    BACK SURGERY      spinal fusion    CATARACT EXTRACTION Bilateral     COLONOSCOPY      EYE SURGERY Bilateral     retinal dtetachment    THYROIDECTOMY, PARTIAL      WRIST SURGERY        11/06/18 1815   Note Type   Note type Eval/Treat   Pain Assessment   Pain Score 3   Pain Type Surgical pain   Pain Location Knee   Pain Orientation Left   Hospital Pain Intervention(s) Cold applied;Repositioned; Ambulation/increased activity; Emotional support   Home Living   Type of [address] level;Stairs to enter without rails  (1 +1 SARAI   + basement, does not need to use )   Bathroom Toilet (chair height )   [address]   Additional Comments I PTA  Drives  Occasional use of cane in community  + hx of L knee buckling with fall 2* knee instabiltiy  Hx of R knee OA   Prior Function   Level of [place] Independent with ADLs and functional mobility   Lives With Spouse   Receives Help From Family   ADL Assistance Independent   IADLs Independent   Falls in the last 6 months 1 to 4   Vocational Retired   Comments Wife retired and able to assist   + community ambulator   Restrictions/Precautions   [place] Bearing Precautions Per Order Yes   LLE [place] Bearing Per Order WBAT   Other Precautions Multiple lines; Fall Risk;Pain   General   Additional Pertinent History Pt is 71 y/o male admitted for L TKR 2* DJD    PT Consulted  OOB POD#0 preferred  Family/Caregiver Present No   Cognition   Overall Cognitive Status WFL   RUE Assessment   RUE Assessment WFL   LUE Assessment   LUE Assessment WFL   RLE Assessment   RLE Assessment WFL   LLE Assessment   LLE Assessment X   Strength LLE   L Hip Flexion 3-/5   L Knee Extension 3-/5   L Ankle Dorsiflexion 4-/5   L Ankle Plantar Flexion 4-/5   Light Touch   RLE Light Touch Grossly intact  (notes hx nueropathy R)   LLE Light Touch Grossly intact   Bed Mobility   Supine to Sit 4  Minimal assistance   Additional items Assist x 1; Increased time required;Verbal cues   Additional Comments cues for technique  A for operative leg  Transfers   Sit to Stand 4  Minimal assistance   Additional items Assist x 1; Increased time required;Verbal cues   Stand to Sit 4  Minimal assistance   Additional items Assist x 1; Increased time required;Verbal cues   Additional Comments cues for hand and operative leg placement   Ambulation/Elevation   Gait pattern Antalgic; Improper Weight shift;Decreased foot clearance;Decreased L stance   Gait Assistance 4  Minimal assist   Additional items Assist x 1;Verbal cues; Tactile cues   Assistive Device Rolling walker   Distance Amb with RW 3'x1  Balance   Static Sitting Good   Dynamic Sitting Fair +   Static Standing Fair   Dynamic Standing Fair -   Ambulatory Fair -   Endurance Deficit   Endurance Deficit Yes   Endurance Deficit Description fatigue   + nausea   Activity Tolerance   Activity Tolerance Patient limited by fatigue;Patient limited by pain   Medical Staff Made Aware NurseJenifer   Nurse Made Aware yes   Assessment   Prognosis Good   Problem List Decreased strength;Decreased range of motion;Decreased endurance; Impaired balance;Decreased mobility; Decreased skin integrity;Orthopedic restrictions;Pain   Assessment Pt is 71 y/o male admitted for L TKR 2* DJD  PT consulted, WBAT  OOB POD #0 preferred    PLOF noted to be independent with occasional use of cane in community  Notes hx of knee instability with falls  Resides with spouse who is supportive and able to provide assistance  Presents with impairments in L knee ROM and strength  Requires Meng for bed mobilty, trasnfers and ambulation with RW support  Gait antalgic with decresed LLE WB  Able to progress OOB to chair with use of RW  Anticipate good progression in order to achieve mobilty goals for safe d/c to home with support of wife and PT  Has DME  PT will follow per POC and progress as tolerated  See progress note for further education and mobilty training following eval    Barriers to Discharge Inaccessible home environment   Barriers to Discharge Comments 1+1 SARAI   Goals   Patient Goals get better and go home  STG Expiration Date 11/13/18   Short Term Goal #1 7 days:  1)  Pt will perform bed mobility with Liz demonstrating appropriate technique 100% of the time in order to improve function  2)  Perform all transfers with Liz demonstrating safe and appropriate technique 100% of the time in order to improve ability to negotiate safely in home environment  3) Amb with least restrictive AD > 200'x1 with mod I in order to demonstrate ability to negotiate in home environment  4)  Improve overall strength and balance 1/2 grade in order to optimize ability to perform functional tasks and reduce fall risk  5) Increase activity tolerance to 45 minutes in order to improve endurance to functional tasks  6)  Negotiate stairs using most appropriate technique and S in order to be able to negotiate safely in home environment  7) PT for ongoing patient and family/caregiver education, DME needs and d/c planning in order to promote highest level of function in least restrictive environment  Treatment Day 1   Plan   Treatment/Interventions Functional transfer training;LE strengthening/ROM; Elevations; Therapeutic exercise; Endurance training;Patient/family training;Equipment eval/education; Bed mobility;Gait training; Compensatory technique education;Continued evaluation;Spoke to nursing   PT Frequency 7x/wk; Twice a day   Recommendation   Recommendation Home PT; Home with family support   Equipment Recommended (has RW, BSC)   PT - OK to Discharge No   Additional Comments to acheive mobilty goals for safe d/c to home  Modified Montgomery Scale   Modified Montgomery Scale 4   Barthel Index   Feeding 10   Bathing 0   Grooming Score 5   Dressing Score 5   Bladder Score 0   Bowels Score 10   Toilet Use Score 5   Transfers (Bed/Chair) Score 10   Mobility (Level Surface) Score 0   Stairs Score 0   Barthel Index Score 45     History: co - morbidities, fall risk, use of assistive device, assist for adl's, SARAI, multiple lines  Exam: impairments in locomotion, musculoskeletal, balance,posture, joint integrity, skin integrity, barthel 45  Clinical: unstable/unpredictable ( POD#0, more restrictive AD, fall risk, post op pain)  Complexity:high    Johnie Thibodeaux PT     Time In: 18:03  Time Out:18:15  Total Time: 12 minutes      S:  Is tired  Prefers to go to bed  O:  Meng transfers sit<>Stand  Cues for hand and operative leg placement  Amb 3'x1 with RW and Meng  Cues for sequencing  Sit to supine with Meng for operative leg management  Assisted with repositioning  Heel roll under ankle to promote extension  Ed on AP ad kat  Reinforced importance of ROm in flexion/extension  Avoiding placing bed with knees in flexed position or placing pillow under knee for comfort  Knees in bed in locked position  Ed on POC, progression and goals of PT for safe d/c to home  A:  Meng needed for transfers  Continues to require cues for technique with transitions  Meng for ambulation needed, cues for sequencing  Verbalizes understanding of improving ROM in knee post operatively  Cont with goal of home with PT   P:  PT per POC      Johnie Thibodeaux PT

## 2018-11-06 NOTE — CONSULTS
Consultation - Internal Medicine  Sameera Gonzalez 70 y o  male MRN: [id number]  Unit/Bed#: E2 -01 Encounter: [id number]      Impression :-    s/p elective left total knee replacement earlier today by Dr Ana Olea  Advanced end-stage DJD, left knee, failed conservative treatments  Ambulatory dysfunction  Postoperative indwelling Carpenter catheter  Postoperative knee pain  History of hyperuricemia, 9 2-3/03/2017, gout  Lumbar spinal stenosis with radiculopathy  Peripheral neuropathy  Hypothyroidism, status post partial thyroidectomy  History of hypertension  Anticoagulated with Xarelto postoperatively     Recommendation: -    Continue postoperative care as recommended by Dr Ana Olea  Discussed with patient regarding his numeric pain scale and current pain medications  Patient stated that he was having severe pain earlier but now has resolved after he got additional pain meds  Discussed with the patient regarding his home medications and reviewed with him  Continue his thyroid supplements, levothyroxine 25 mcg daily in the morning, lisinopril 10 mg p o  Daily, hold if blood pressure less than 383 systolic, allopurinol 484 mg p o  Daily Neurontin 100 mg for total of 600 mg daily as he has been taking at home  I have reviewed patients medications as initiated on post operative orders by the primary team  Recommend  monitoring closely for any hypoxemia / respiratory insufficieny related to narcotics and to reduce doses and frequency of narcotics if necessary  Resume patients home medications and I have reviewed them  Maintain Intravenous Fluids for next 24 -48 hours, till patient able to reliably keep meals and meds in  Suggest  Zofran ODT 4 mg sublingually PRN for control of post operative nausea  Patient encouraged to  use Incentive spirometry q 15 minutes while awake to minimize risk of postoperative atelectasis and pneumonia  Patient verbalized to understand and fully comprehend      Recommend DVT prophylaxis with use of Venodyne compression boots  On Xarelto 10 mg as per Ortho team based on his hemostasis  Discontinue patient's Carpenter catheter within next 24-48 hours in order  to minimize risk of catheter associated UTI  Please check patient's hemoglobin and hematocrit within next 24 hours to ensure that there is no acute blood loss anemia which would need any blood transfusion  We will follow this pleasant patient with your service closely and recommend necessary changes based on  further hospital course and diagnostics  Thank you, Dr Tyra Shea , for your kind consultation  HISTORY OF PRESENT ILLNESS:    Reason for Consult:  Post operative management following elective replacement of left knee  HPI: Sara Davis is a 70 y o  male was suffered with chronic pain in his left knee for past few years  This has been progressively getting worse in over last 4-5 months  Patient had tried various conservative treatments including pain medications, lifestyle modifications, use of assist devices physical therapy but continued to deteriorate  He was referred to see orthopedic service by his primary care team and he was evaluated by Dr Tyra Shea  Patient was given various options including conservative treatment continuation versus more definitive knee surgery  After being cleared by his primary care team he underwent his surgery earlier today  Patient recollects of having severe pain at least 10/10 on numeric pain scale for past many months  He is a retired  from [employer], he spends his time doing things around his home and these are becoming increasingly hard  Prolonged sitting standing was becoming hard and he was at times unable to sleep    Patient had imaging studies done of his knees which confirmed severe osteoarthritis and had discussion about various a surgical options with orthopedic team   Patient has history of chronic lower back pain and had surgeries in the past  He has neuropathy and takes Neurontin for the same  At present he was having mild to moderate pain after a came back from surgery and the took some time to controlled  At present on my interview he seemed to be doing well and was watching TV  Review of Systems   Constitutional:  Denies chills, diaphoresis, fatigue or fever  HEENT:  Negative for congestion, sore throat and tinnitus  No visual complaints  Respiratory:  Negative cough, chest tightness, shortness of breath and wheezing  Cardiovascular:  Negative for chest pain and palpitations  Gastrointestinal: Negative for abdominal distention or pain, constipation, diarrhea and nausea  Endocrine: Negative for cold intolerance, heat intolerance, polydipsia and polyuria  Genitourinary:                Negative for  dysuria, flank pain  Tolerating Carpenter without difficulty  Skin:   Negative for color change, pallor and rash  Neurological:   Negative for dizziness, tremors, seizures, syncope, facial asymmetry   Hematological:  Musculoskeletal:  Psychiatric:  No h/o spontaneous bruising/bleeding  Joint pains as above  Negative for agitation, behavioral problems      A complete system-based review of systems as discussed with patient is otherwise negative      PAST MEDICAL HISTORY:  Past Medical History:   Diagnosis Date    Arthritis     Basal cell carcinoma in situ of skin     on back    Disease of thyroid gland     hypothyroid    GERD (gastroesophageal reflux disease)     Gout     Gout     Hypertension     Neuropathy     right foot    Spinal stenosis, lumbar     Wears dentures     upper full    Wears hearing aid     both ears     Past Surgical History:   Procedure Laterality Date    BACK SURGERY      spinal fusion    CATARACT EXTRACTION Bilateral     COLONOSCOPY      EYE SURGERY Bilateral     retinal dtetachment    THYROIDECTOMY, PARTIAL      WRIST SURGERY         FAMILY HISTORY:  Non-contributory    SOCIAL HISTORY:  History   Alcohol Use    Yes     Comment: occasional     History   Drug Use No     History   Smoking Status    Never Smoker   Smokeless Tobacco    Never Used       ALLERGIES:  Allergies   Allergen Reactions    Aspirin Hives     palpitations       MEDICATIONS:  All current active medications have been reviewed    Current Facility-Administered Medications   Medication Dose Route Frequency Provider Last Rate Last Dose    acetaminophen (TYLENOL) tablet 650 mg  650 mg Oral Q6H PRN Lavera Daily, PA-C        ceFAZolin (ANCEF) IVPB (premix) 1,000 mg  1,000 mg Intravenous Q8H Lavera Daily, PA-C        docusate sodium (COLACE) capsule 100 mg  100 mg Oral BID Lavera Daily, PA-C   100 mg at 11/06/18 1741    [START ON 11/7/2018] ferrous sulfate tablet 325 mg  325 mg Oral Daily With Breakfast Lavera Daily, PA-C        HYDROmorphone (DILAUDID) injection 0 5 mg  0 5 mg Intravenous Q2H PRN Lavera Daily, PA-C        lactated ringers infusion  100 mL/hr Intravenous Continuous Lavera Daily, PA-C 100 mL/hr at 11/06/18 1411 100 mL/hr at 11/06/18 1411    ondansetron (ZOFRAN) injection 4 mg  4 mg Intravenous Q8H PRN Lavera Daily, PA-C        oxyCODONE (ROXICODONE) immediate release tablet 10 mg  10 mg Oral Q4H PRN Lavera Daily, PA-C   10 mg at 11/06/18 1741    oxyCODONE (ROXICODONE) IR tablet 5 mg  5 mg Oral Q4H PRN Lavera Daily, PA-C        [START ON 11/7/2018] rivaroxaban (XARELTO) tablet 10 mg  10 mg Oral Daily With [employer], PA-C        [START ON 11/7/2018] senna (SENOKOT) tablet 8 6 mg  1 tablet Oral Daily Lavera Daily, PA-C        sodium chloride 0 9 % infusion  125 mL/hr Intravenous Continuous Rosa Estrada DO   Stopped at 11/06/18 1410       Prescriptions Prior to Admission   Medication    gabapentin (NEURONTIN) 100 mg capsule    Levothyroxine Sodium 25 MCG CAPS    lisinopril (ZESTRIL) 10 mg tablet    ranitidine (ZANTAC) 150 MG capsule    allopurinol (ZYLOPRIM) 100 mg tablet    colchicine (COLCRYS) 0 6 mg tablet     PHYSICAL EXAM:    Vitals:  Temp:  [97 5 °F (36 4 °C)-98 4 °F (36 9 °C)] 97 7 °F (36 5 °C)  HR:  [54-74] 62  Resp:  [10-23] 14  BP: (150-180)/(72-93) 155/83  SpO2:  [95 %-99 %] 96 %  Temp (24hrs), Av 8 °F (36 6 °C), Min:97 5 °F (36 4 °C), Max:98 4 °F (36 9 °C)  Current: Temperature: 97 7 °F (36 5 °C)  Body mass index is 28 23 kg/m²  Patient is fully awake and alert not in any apparent distress  Skin turgor appears to be normal, tongue protrudes in midline but was very dry  Pupils equal and reactive  Neck was supple without any elevation of JVP  S1S2 normal no murmurs or gallops  Bilateral lung zones are clear without any rales or crackles  Abdomen is slightly protuberant from central obesity but no tenderness or guarding  Operated left knee with clean dressing no discharge no drainage  Bilateral plantar flexion dorsiflexion intact  Good hand grasp  No paresthesias or tingling on light touch  Distal pulses are intact with good perfusion of toe tips  Skin is dry, warm, back has some lentigines oblong rated 1 5-2 cm bilateral scapular areas  LABS, IMAGING, & OTHER STUDIES:  Lab Results:  I have personally reviewed pertinent labs    Lab Results   Component Value Date     2015     10/30/2018    CO2 28 10/30/2018    ANIONGAP 8 2015    BUN 11 10/30/2018    CREATININE 1 04 10/30/2018    EGFR 73 10/30/2018    GLUCOSE 79 2015    CALCIUM 9 1 10/30/2018    AST 20 10/30/2018    ALT 24 10/30/2018    ALKPHOS 59 10/30/2018     Lab Results   Component Value Date    WBC 7 87 10/30/2018    WBC 6 56 10/20/2016    WBC 9 04 2015    HGB 16 3 10/30/2018    HGB 16 1 10/20/2016    HGB 16 7 2015     10/30/2018     10/20/2016     2015       Lab Results   Component Value Date    INR 1 12 2015    INR 1 20 (H) 2014    HGBA1C 5 7 10/27/2016         Imaging Studies:   I have personally reviewed pertinent imaging study reports  Imaging:    MRI of the right knee, 04/15/2015  · Complex tear of the posterior horn of the medial and lateral meniscus  · Posterior cruciate ligament is torn  · Anterior cruciate ligament is intact  · Moderate size joint effusion and moderate complicated popliteal cyst  · Tricompartmental degenerative changes with cartilage thinning and all 3 compartments  Xr Chest Pa & Lateral    10/31/2018  Small benign nodular opacity left upper lobe, likely calcified granuloma, stable   Cardiomediastinal silhouette appears unremarkable  The lungs are clear  No pneumothorax or pleural effusion  Osseous structures appear within normal limits for patient age  Impression: No acute cardiopulmonary disease  Similar to previous study  EKG, Pathology, and Other Studies:   I have personally reviewed pertinent reports  Preoperative electrocardiogram dated 10/30/2018 was normal sinus rhythm at 63 beats per minute, AL interval 170 milliseconds  Portions of the record may have been created with voice recognition software  Occasional wrong word or "sound a like" substitutions may have occurred due to the inherent limitations of voice recognition software  Read the chart carefully and recognize, using context, where substitutions have occurred

## 2018-11-06 NOTE — ANESTHESIA PROCEDURE NOTES
Spinal Block    Patient location during procedure: OR  Start time: 11/6/2018 11:40 AM  End time: 11/6/2018 11:50 AM  Reason for block: at surgeon's request and primary anesthetic  Staffing  Anesthesiologist: Sanjuanita Sinha  Performed: anesthesiologist   Preanesthetic Checklist  Completed: patient identified, site marked, surgical consent, pre-op evaluation, timeout performed, IV checked, risks and benefits discussed and monitors and equipment checked  Spinal Block  Patient position: sitting  Prep: Betadine  Patient monitoring: heart rate, continuous pulse ox and frequent blood pressure checks  Approach: midline  Location: L3-4  Injection technique: single-shot  Needle  Needle type: pencil-tip   Needle gauge: 25 G  Needle length: 10 cm  Assessment  Sensory level: T10  Events: cerebrospinal fluid  Injection Assessment:  negative aspiration for heme, positive aspiration for clear CSF and no paresthesia on injection    Post-procedure:  site cleaned

## 2018-11-06 NOTE — ANESTHESIA PROCEDURE NOTES
Peripheral Block    Patient location during procedure: holding area  Start time: 11/6/2018 10:39 AM  Removal time: 11/6/2018 10:44 AM  Reason for block: at surgeon's request  Staffing  Anesthesiologist: Beverly Liang  Performed: anesthesiologist   Preanesthetic Checklist  Completed: patient identified, site marked, surgical consent, pre-op evaluation, timeout performed, IV checked, risks and benefits discussed and monitors and equipment checked  Peripheral Block  Patient position: supine  Prep: ChloraPrep  Patient monitoring: heart rate, continuous pulse ox and frequent blood pressure checks  Block type: adductor canal block  Laterality: left  Procedures: ultrasound guided  Ultrasound permanent image saved  Local infiltration: ropivacaine  Infiltration strength: 0 5 %  Dose: 30 mL  Needle  Needle type: Stimuplex   Needle gauge: 22 G  Needle length: 10 cm  Needle localization: ultrasound guidance  Assessment  Injection assessment: incremental injection, local visualized surrounding nerve on ultrasound, negative aspiration for heme and no paresthesia on injection  Post-procedure:  site cleaned  patient tolerated the procedure well with no immediate complications

## 2018-11-06 NOTE — OP NOTE
Left Total Knee Procedure Note    Patient Name: Ramakrishna Isaac  MRN: [id number]  Date of Surgery 11/6/2018    Surgeon: Dameon Kimball MD  Assistant: Jayshree Roberson [facility]    Indications: Degenerative joint disease left knee with failed conservative care  Pre-operative Diagnosis: Left knee degenerative joint disease  Post-operative Diagnosis: Left knee degenerative joint disease  Anesthesia:  Choice  Operative procedure: Left total knee arthroplasty    Implants:     Implant Name Type Inv  Item Serial No   Lot No  LRB No  Used   CEMENT BONE SMART SET GRAY MED VISC - [id number]  CEMENT BONE SMART SET LEDBETTER MED VISC  DEPUY [id number] Left 1   COMPONENT FEM SZ 8 LT CMNT PS ATTUNE - [id number]  COMPONENT FEM SZ 8 LT CMNT PS ATTUNE  DEPUY [id number] Left 1   INSERT TIBIAL 6MM SZ 8 PS ROTPLT ATTUNE - [id number]  INSERT TIBIAL 6MM SZ 8 PS ROTPLT ATTUNE  DEPUY [id number] Left 1   BASEPLATE TIBIAL SZ 7 CMNT ROTPLT ATTUNE KNEE SYSTM - [id number]  BASEPLATE TIBIAL SZ 7 CMNT ROTPLT ATTUNE KNEE SYSTM  DEPUY [id number] Left 1   COMPONENT PATELLAR 35MM CMNT ATTUNE - [id number]   COMPONENT PATELLAR 35MM CMNT ATTUNE   DEPUY [id number] Left 1       Tourniquet time: 41 minutes at 250 mmHg    Drains: None    Estimated blood loss: 100 cc    Antibiotics: Given preoperatively    Clinical note: Ramakrishna Isaac is a 70 y o  male who presents with severe left knee pain and disability  Physical exam investigations was consistent with degenerative joint disease  The patient been treated nonoperatively and failed to improve  Nonoperative versus operative options reviewed  The patient did understand the situation and did wish to proceed with operative management    Description of procedure: The patient was identified as Ramakrishna Isaac and the left knee as the surgical site  Anesthesia was administered  The patient's left lower extremity was elevated and tourniquet applied to the proximal thigh    The left lower extremity was then prepped and free draped in usual fashion for knee surgery  Utilizing an Esmarch bandage, the left lower extremity was stripped followed by inflation of tourniquet  A medial parapatellar approach was made and sharp dissection was carried down through skin and subcutaneous tissue  A medial parapatellar arthrotomy was performed the patella was everted and the knee was flexed  End-stage degenerative changes within the left knee were identified  The cruciate ligament were divided  Utilizing an external tibial cutting guide, the tibial cut was made  The menisci were removed  The femoral cuts were then made utilizing the intramedullary guide system and appropriate cutting jigs  Flexion and extension gaps were found to be satisfactory and the tibia was then machined to accept the tibial component  A trial reduction was carried out and the knee was found to be stable and went through satisfactory range of motion  The patella was cut with freehand technique and appropriately sized followed by placement of a trial component  The patella was noted to track normally and all trial components removed  The knee was then copiously irrigated with same solution followed by cementing a final components in place starting with the tibia followed by femoral component  The tibial bearing was inserted and knee was held in extension followed by cementing of the patellar component holding it in place with a patellar clamp  Excess cement was removed  The knee was held in extension until the cement cured  The knee was then checked for range of motion and found to be excellent with excellent stability  The tourniquet was released and hemostasis was obtained  The incision was then closed in layers utilizing #1 Vicryl for deep layers, 2-0 Vicryl for subcutaneous closure and a 3 Monocryl subcuticular stitch for skin  Steri-Strips were applied  A soft absorbent bandage and Ace wrap was added    The patient be transferred to restrictions on position and taken to recovery  There were no complications  Throughout the procedure, assistance by Anne-Marie Gracia PA-C was required  She was required to aid in positioning the patient preoperatively and intraoperatively she was required to manipulate the patient's left lower extremity as well as various retractors and other equipment under my guidance  On completion of procedure, she was required to aid in closure of the wound and application of bandage  She was also required to aid in transfer the patient to recovery  AP and lateral views of the left knee were obtained in recovery  This demonstrated appropriate positioning total knee arthroplasty components  There was no evidence loosening or failure  There was air in the soft tissues consistent with immediate postoperative status the radiographs          Emili oJnes MD      Date: 11/6/2018  Time: 1:21 PM

## 2018-11-06 NOTE — ANESTHESIA POSTPROCEDURE EVALUATION
Post-Op Assessment Note      CV Status:  Stable    Mental Status:  Alert and awake    Hydration Status:  Euvolemic    PONV Controlled:  Controlled    Airway Patency:  Patent    Post Op Vitals Reviewed: Yes          Staff: Anesthesiologist           /83 (11/06/18 1428)    Temp      Pulse 58 (11/06/18 1428)   Resp 15 (11/06/18 1428)    SpO2 95 % (11/06/18 1428)

## 2018-11-06 NOTE — PLAN OF CARE
Problem: PHYSICAL THERAPY ADULT  Goal: Performs mobility at highest level of function for planned discharge setting  See evaluation for individualized goals  Treatment/Interventions: Functional transfer training, LE strengthening/ROM, Elevations, Therapeutic exercise, Endurance training, Patient/family training, Equipment eval/education, Bed mobility, Gait training, Compensatory technique education, Continued evaluation, Spoke to nursing  Equipment Recommended:  (has RW, BSC)       See flowsheet documentation for full assessment, interventions and recommendations  Outcome: Progressing  Prognosis: Good  Problem List: Decreased strength, Decreased range of motion, Decreased endurance, Impaired balance, Decreased mobility, Decreased skin integrity, Orthopedic restrictions, Pain  Assessment: Pt is 69 y/o male admitted for L TKR 2* DJD  PT consulted, WBAT  OOB POD #0 preferred  PLOF noted to be independent with occasional use of cane in community  Notes hx of knee instability with falls  Resides with spouse who is supportive and able to provide assistance  Presents with impairments in L knee ROM and strength  Requires Meng for bed mobilty, trasnfers and ambulation with RW support  Gait antalgic with decresed LLE WB  Able to progress OOB to chair with use of RW  Anticipate good progression in order to achieve mobilty goals for safe d/c to home with support of wife and PT  Has DME  PT will follow per POC and progress as tolerated  See progress note for further education and mobilty training following eval   Barriers to Discharge: Inaccessible home environment  Barriers to Discharge Comments: 1+1 SARAI  Recommendation: Home PT, Home with family support     PT - OK to Discharge: No    See flowsheet documentation for full assessment

## 2018-11-07 LAB
ANION GAP SERPL CALCULATED.3IONS-SCNC: 6 MMOL/L (ref 4–13)
BUN SERPL-MCNC: 10 MG/DL (ref 5–25)
CALCIUM SERPL-MCNC: 8.2 MG/DL (ref 8.3–10.1)
CHLORIDE SERPL-SCNC: 102 MMOL/L (ref 100–108)
CO2 SERPL-SCNC: 27 MMOL/L (ref 21–32)
CREAT SERPL-MCNC: 1.02 MG/DL (ref 0.6–1.3)
ERYTHROCYTE [DISTWIDTH] IN BLOOD BY AUTOMATED COUNT: 12.2 % (ref 11.6–15.1)
GFR SERPL CREATININE-BSD FRML MDRD: 75 ML/MIN/1.73SQ M
GLUCOSE SERPL-MCNC: 172 MG/DL (ref 65–140)
HCT VFR BLD AUTO: 39 % (ref 36.5–49.3)
HGB BLD-MCNC: 13.5 G/DL (ref 12–17)
MCH RBC QN AUTO: 31.1 PG (ref 26.8–34.3)
MCHC RBC AUTO-ENTMCNC: 34.6 G/DL (ref 31.4–37.4)
MCV RBC AUTO: 90 FL (ref 82–98)
PLATELET # BLD AUTO: 180 THOUSANDS/UL (ref 149–390)
PMV BLD AUTO: 10.6 FL (ref 8.9–12.7)
POTASSIUM SERPL-SCNC: 4.2 MMOL/L (ref 3.5–5.3)
RBC # BLD AUTO: 4.34 MILLION/UL (ref 3.88–5.62)
SODIUM SERPL-SCNC: 135 MMOL/L (ref 136–145)
WBC # BLD AUTO: 15.15 THOUSAND/UL (ref 4.31–10.16)

## 2018-11-07 PROCEDURE — 97530 THERAPEUTIC ACTIVITIES: CPT

## 2018-11-07 PROCEDURE — 97166 OT EVAL MOD COMPLEX 45 MIN: CPT

## 2018-11-07 PROCEDURE — 97110 THERAPEUTIC EXERCISES: CPT

## 2018-11-07 PROCEDURE — 80048 BASIC METABOLIC PNL TOTAL CA: CPT | Performed by: PHYSICIAN ASSISTANT

## 2018-11-07 PROCEDURE — 97116 GAIT TRAINING THERAPY: CPT

## 2018-11-07 PROCEDURE — G8987 SELF CARE CURRENT STATUS: HCPCS

## 2018-11-07 PROCEDURE — 85027 COMPLETE CBC AUTOMATED: CPT | Performed by: ORTHOPAEDIC SURGERY

## 2018-11-07 PROCEDURE — G8988 SELF CARE GOAL STATUS: HCPCS

## 2018-11-07 RX ORDER — LISINOPRIL 10 MG/1
10 TABLET ORAL 2 TIMES DAILY
Status: DISCONTINUED | OUTPATIENT
Start: 2018-11-07 | End: 2018-11-08 | Stop reason: HOSPADM

## 2018-11-07 RX ORDER — ENALAPRILAT 2.5 MG/2ML
0.62 INJECTION INTRAVENOUS 4 TIMES DAILY PRN
Status: DISCONTINUED | OUTPATIENT
Start: 2018-11-07 | End: 2018-11-08 | Stop reason: HOSPADM

## 2018-11-07 RX ADMIN — OXYCODONE HYDROCHLORIDE 10 MG: 10 TABLET ORAL at 08:27

## 2018-11-07 RX ADMIN — ENALAPRILAT 0.62 MG: 1.25 INJECTION INTRAVENOUS at 16:16

## 2018-11-07 RX ADMIN — LISINOPRIL 10 MG: 10 TABLET ORAL at 08:26

## 2018-11-07 RX ADMIN — RIVAROXABAN 10 MG: 10 TABLET, FILM COATED ORAL at 16:15

## 2018-11-07 RX ADMIN — LISINOPRIL 10 MG: 10 TABLET ORAL at 17:36

## 2018-11-07 RX ADMIN — SENNOSIDES 8.6 MG: 8.6 TABLET, FILM COATED ORAL at 08:27

## 2018-11-07 RX ADMIN — ACETAMINOPHEN 650 MG: 325 TABLET, FILM COATED ORAL at 16:15

## 2018-11-07 RX ADMIN — ALLOPURINOL 100 MG: 100 TABLET ORAL at 08:27

## 2018-11-07 RX ADMIN — GABAPENTIN 600 MG: 300 CAPSULE ORAL at 08:27

## 2018-11-07 RX ADMIN — DOCUSATE SODIUM 100 MG: 100 CAPSULE, LIQUID FILLED ORAL at 17:36

## 2018-11-07 RX ADMIN — LEVOTHYROXINE SODIUM 25 MCG: 25 TABLET ORAL at 06:35

## 2018-11-07 RX ADMIN — DOCUSATE SODIUM 100 MG: 100 CAPSULE, LIQUID FILLED ORAL at 08:27

## 2018-11-07 RX ADMIN — FERROUS SULFATE TAB 325 MG (65 MG ELEMENTAL FE) 325 MG: 325 (65 FE) TAB at 06:36

## 2018-11-07 RX ADMIN — CEFAZOLIN SODIUM 1000 MG: 1 SOLUTION INTRAVENOUS at 04:07

## 2018-11-07 RX ADMIN — FAMOTIDINE 20 MG: 20 TABLET ORAL at 08:26

## 2018-11-07 NOTE — PLAN OF CARE
Problem: PHYSICAL THERAPY ADULT  Goal: Performs mobility at highest level of function for planned discharge setting  See evaluation for individualized goals  Treatment/Interventions: Functional transfer training, LE strengthening/ROM, Elevations, Therapeutic exercise, Endurance training, Patient/family training, Equipment eval/education, Bed mobility, Gait training, Compensatory technique education, Continued evaluation, Spoke to nursing  Equipment Recommended:  (has RW, BSC)       See flowsheet documentation for full assessment, interventions and recommendations  Outcome: Progressing  Prognosis: Good  Problem List: Decreased strength, Decreased range of motion, Decreased endurance, Impaired balance, Decreased mobility, Decreased skin integrity, Orthopedic restrictions, Pain  Assessment: Seen for progression of mobilty and strengthening  Priyanka needed for tranfers and ambulation  Limited ambultion 2* nausea and dizziness  /79 p ambulation despite  Initiated progression of TKR protocol ther ex  AAROM needed  Issued HEP  Omitted SAQ at this time 2* pain  Overall progressing, however will cont to work toward [employer] for safe d/c to home wiht PT  Barriers to Discharge: Inaccessible home environment  Barriers to Discharge Comments: 1+1 SARAI  Recommendation: Home PT, Home with family support     PT - OK to Discharge: No    See flowsheet documentation for full assessment

## 2018-11-07 NOTE — PLAN OF CARE
Problem: PHYSICAL THERAPY ADULT  Goal: Performs mobility at highest level of function for planned discharge setting  See evaluation for individualized goals  Treatment/Interventions: Functional transfer training, LE strengthening/ROM, Elevations, Therapeutic exercise, Endurance training, Patient/family training, Equipment eval/education, Bed mobility, Gait training, Compensatory technique education, Continued evaluation, Spoke to nursing  Equipment Recommended:  (has RW, BSC)       See flowsheet documentation for full assessment, interventions and recommendations  Outcome: Progressing  Prognosis: Good  Problem List: Decreased strength, Decreased range of motion, Decreased endurance, Impaired balance, Decreased mobility, Decreased skin integrity, Orthopedic restrictions, Pain  Assessment: Pt  supine in bed upon my arrival  BP measured supine 176/79  HEP supine in bed with cues provided for proper completion  Progressed with transfers continuing to require A of therapist with cues for hand placement/technique  Pt  requested to use br, progressed with a limited amb  trial with use of RW and standbyA of therapist with cues for LE sequencing  Able to complete reciprocal gait pattern this treatment session  Returned to seated at EOB where BP was measured at 183/82  Returned to supine in bed with ice applied and towel roll placed at end of treatment session  Informed RN of elevated BP measurements  Pt  will continue progression of PT goals with intent of d/c home with HHPT and family support as needed when medically stable  Barriers to Discharge: Inaccessible home environment  Barriers to Discharge Comments: SARAI  Recommendation: Home PT, Home with family support     PT - OK to Discharge: No (Continued progression of PT goals prior to d/c )    See flowsheet documentation for full assessment

## 2018-11-07 NOTE — ADDENDUM NOTE
Addendum  created 11/07/18 1411 by Villa Billy DO    Anesthesia Intra Blocks edited, Sign clinical note

## 2018-11-07 NOTE — PROGRESS NOTES
Orthopedic Total Knee Progress Note  ([facility])    ASSESSMENT & PLAN:  Status post- LEFT total knee arthroplasty:  LOS: 0 days    Doing well postoperatively  Pain Relief: Pt comfortable and pain controlled  Continues current post-op course  Activity: up with assistance  Working with PT / OT  Weight Bearing: WBAT      SUBJECTIVE:    Systemic or Specific Complaints: Pain medications covering pain  OBJECTIVE:  Vital signs in last 24 hours:  Temp:  [97 5 °F (36 4 °C)-100 °F (37 8 °C)] 98 5 °F (36 9 °C)  HR:  [54-89] 70  Resp:  [10-23] 16  BP: (114-181)/(58-93) 181/87  General: alert, appears stated age and cooperative   Neurovascular: Intact    Wound: No Erythema and Wound Intact   Range of Motion: Normal for post surgery   DVT Exam: Negative Rosana's sign  No cords or calf tenderness  No significant calf/ankle edema       Data Review:  CBC:   Lab Results   Component Value Date    WBC 15 15 (H) 11/07/2018    WBC 9 04 04/17/2015    RBC 4 34 11/07/2018    RBC 5 33 04/17/2015    HGB 13 5 11/07/2018    HGB 16 7 04/17/2015    HCT 39 0 11/07/2018    HCT 47 0 04/17/2015     11/07/2018     04/17/2015     Plan:  DVT Prophylaxis -  mg BID x 6 weeks  Mobilize with PT / OT  D/C Planning for Disposition    Prakash Duke PA-C  Date: 11/7/2018  Time: 8:08 AM

## 2018-11-07 NOTE — PLAN OF CARE

## 2018-11-07 NOTE — PHYSICAL THERAPY NOTE
Physical Therapy Progress Note     11/07/18 4165   Pain Assessment   Pain Assessment 0-10   Pain Score 5   Pain Type Surgical pain   Pain Location Knee   Pain Orientation Left   Hospital Pain Intervention(s) Ambulation/increased activity;Repositioned;Cold applied   Response to Interventions Tolerated  Restrictions/Precautions   Weight Bearing Precautions Per Order Yes   LLE Weight Bearing Per Order WBAT   Other Precautions Fall Risk;Pain   General   Chart Reviewed Yes   Response to Previous Treatment Patient with no complaints from previous session  Family/Caregiver Present No   Subjective   Subjective Willing to participate in therapy this PM    Bed Mobility   Supine to Sit 4  Minimal assistance   Additional items Assist x 1;Bedrails;Leg ; Increased time required;Verbal cues;LE management   Sit to Supine 5  Supervision   Additional items Assist x 1;Bedrails;Leg ; Increased time required;Verbal cues;LE management   Transfers   Sit to Stand 5  Supervision   Additional items Assist x 1;Bedrails; Increased time required;Verbal cues   Stand to Sit 4  Minimal assistance   Additional items Assist x 1;Bedrails; Increased time required;Verbal cues   Ambulation/Elevation   Gait pattern Improper Weight shift; Antalgic;Decreased foot clearance;Decreased L stance; Short stride; Excessively slow  (step through gait pattern)   Gait Assistance 5  Supervision   Additional items Assist x 1;Verbal cues; Tactile cues   Assistive Device Rolling walker   Distance 15' x 2 with standing rest in between   Balance   Static Sitting Good   Dynamic Sitting Fair +   Static Standing Fair   Dynamic Standing Fair -   Ambulatory Fair   Endurance Deficit   Endurance Deficit Yes   Endurance Deficit Description fatigue/elevated BP   Activity Tolerance   Activity Tolerance Patient limited by fatigue;Treatment limited secondary to medical complications (Comment)   Nurse Made Aware Yes   Exercises   TKR Supine;Sitting;10 reps;AAROM; Bilateral Assessment   Prognosis Good   Problem List Decreased strength;Decreased range of motion;Decreased endurance; Impaired balance;Decreased mobility; Decreased skin integrity;Orthopedic restrictions;Pain   Assessment Pt  supine in bed upon my arrival  BP measured supine 176/79  HEP supine in bed with cues provided for proper completion  Progressed with transfers continuing to require A of therapist with cues for hand placement/technique  Pt  requested to use br, progressed with a limited amb  trial with use of RW and standbyA of therapist with cues for LE sequencing  Able to complete reciprocal gait pattern this treatment session  Returned to seated at EOB where BP was measured at 183/82  Returned to supine in bed with ice applied and towel roll placed at end of treatment session  Informed RN of elevated BP measurements  Pt  will continue progression of PT goals with intent of d/c home with HHPT and family support as needed when medically stable  Barriers to Discharge Inaccessible home environment   Barriers to Discharge Comments SARAI   Goals   Patient Goals To go home when ready  STG Expiration Date 11/13/18   Treatment Day 3   Plan   Treatment/Interventions Functional transfer training;LE strengthening/ROM; Therapeutic exercise; Endurance training;Bed mobility;Gait training;Spoke to nursing;Spoke to case management   Progress Slow progress, medical status limitations   PT Frequency 7x/wk; Twice a day   Recommendation   Recommendation Home PT; Home with family support   Equipment Recommended Other (Comment)  (has DME)   PT - OK to Discharge No  (Continued progression of PT goals prior to d/c )     Jose Palomino, RENEA

## 2018-11-07 NOTE — OCCUPATIONAL THERAPY NOTE
[address]  Evaluation     Patient Name: Hernando GOVEA Date: 11/7/2018  Problem List  Patient Active Problem List   Diagnosis    Localized osteoarthritis of left knee     Past Medical History  Past Medical History:   Diagnosis Date    Arthritis     Basal cell carcinoma in situ of skin     on back    Disease of thyroid gland     hypothyroid    GERD (gastroesophageal reflux disease)     Gout     Gout     Hypertension     Neuropathy     right foot    Spinal stenosis, lumbar     Wears dentures     upper full    Wears hearing aid     both ears     Past Surgical History  Past Surgical History:   Procedure Laterality Date    BACK SURGERY      spinal fusion    CATARACT EXTRACTION Bilateral     COLONOSCOPY      EYE SURGERY Bilateral     retinal dtetachment    MA TOTAL KNEE ARTHROPLASTY Left 11/6/2018    Procedure: ARTHROPLASTY KNEE TOTAL;  Surgeon: Nathan Mabry MD;  Location: [facility];  Service: Orthopedics    THYROIDECTOMY, PARTIAL      WRIST SURGERY           11/07/18 1008   Note Type   Note type Eval only   Restrictions/Precautions   Weight Bearing Precautions Per Order Yes   LLE Weight Bearing Per Order WBAT   Other Precautions Multiple lines; Fall Risk;Pain   Pain Assessment   Pain Assessment 0-10   Pain Score 3   Pain Type Acute pain;Surgical pain   Pain Location Knee   Pain Orientation Left   Pain Descriptors Aching   Clinical Progression Gradually improving   Hospital Pain Intervention(s) Repositioned; Ambulation/increased activity; Emotional support; Rest   Response to Interventions Tolerated OT   Multiple Pain Sites No   Home Living   Type of [address] One level;Stairs to enter without rails  (1+1 SARAI; has basement, does not need to use)   Bathroom Shower/Tub Tub/shower unit   [employer] Raised   Bathroom Equipment Grab bars in shower;Commode; Other (Comment)  (has access to shower chair)   [address] Walker;Cane Additional Comments Pt lives with spouse in a one level house with 1+1 SARAI  Pt reports spouse is able to assist as needed  Prior Function   Level of [place] Independent with ADLs and functional mobility   Lives With Spouse   Receives Help From Family   ADL Assistance Independent   IADLs Independent   Falls in the last 6 months 1 to 4   Vocational Retired   Comments At baseline, pt was I w/ ADLs, IADLs, and functional mobility/transfers w/ use of SPC for community mobility as needed, (+) , and reports 1 fall PTA 2* L knee buckling  Lifestyle   Autonomy At baseline, pt was I w/ ADLs, IADLs, and functional mobility/transfers w/ use of SPC for community mobility as needed, (+) , and reports 1 fall PTA 2* L knee buckling  Reciprocal Relationships Lives with spouse   Service to Others Retired   Intrinsic Gratification Spending time with family   Psychosocial   Psychosocial (WDL) WDL   ADL   Eating Assistance 6  Modified independent   [address] [facility] 5  Supervision/Setup   LB [address] 4  [address] 5  Supervision/Setup    [facility] 4  [address] [facility]  4  [address] [facility] 4  Minimal Assistance   Bed Mobility   Supine to Sit Unable to assess  (Pt seated OOB in chair at start of session)   Sit to Supine 4  Minimal assistance   Additional items Assist x 1; Increased time required;Verbal cues;LE management   Additional Comments Pt's BPs were as follows: seated in chair prior to mobility- 151/76; Seated EOB after mobility- 156/79; Pt lying supine at end of session with call bell and phone within reach  All needs met and pt reports no further questions for OT at this time   Transfers   Sit to Stand 4  Minimal assistance   Additional items Assist x 1; Increased time required;Verbal cues;Armrests   Stand to Sit 4  Minimal assistance   Additional items Assist x 1; Increased time required;Verbal cues   Additional Comments Cues for safe technique and placement of hands and operative leg   Functional Mobility   Functional Mobility 4  Minimal assistance   Additional Comments Assist x1   Additional items Rolling walker   Balance   Static Sitting Good   Dynamic Sitting Fair +   Static Standing Fair   Dynamic Standing Fair -   Ambulatory Fair -   Activity Tolerance   Activity Tolerance Patient limited by fatigue;Treatment limited secondary to medical complications (Comment)  (increased nausea)   Medical Staff Adela Jackson, PT   Nurse Made Aware Pt appropriate to be seen per EUGENIO Burgess   RUE Assessment   RUE Assessment WFL   LUE Assessment   LUE Assessment WFL   Hand Function   Gross Motor Coordination Functional   Fine Motor Coordination Functional   Sensation   Light Touch No apparent deficits   Sharp/Dull No apparent deficits   Proprioception   Proprioception No apparent deficits   Vision - Complex Assessment   Ocular Range of Motion WFL   Acuity Able to read clock/calendar on wall without difficulty   Perception   Inattention/Neglect Appears intact   Cognition   Overall Cognitive Status [facility]   Arousal/Participation Alert; Cooperative   Attention Within functional limits   Orientation Level Oriented X4   Memory Within functional limits   Following Commands Follows one step commands without difficulty   Comments Pt pleasant and cooperative; Engages in conversation appropriately   Assessment   Limitation Decreased ADL status; Decreased endurance;Decreased self-care trans;Decreased high-level ADLs   Prognosis Good   Assessment Pt is a 70 y o  male seen for OT evaluation s/p adm to [address] on 11/6/2018 s/p L TKR on 11/6/18  Pt w/ orders for WBAT L LE  Comorbidities affecting pts functional performance include a significant PMH of Arthritis, Basal cell carcinoma, hypothyroidism, Gout, GERD, HTN, Neuropathy in R foot, and spinal stenosis   Pt with active OT orders and activity orders for Up with assistance  Pt lives with spouse in a one level house with 1+1 SARAI  Pt reports spouse is able to assist as needed  At baseline, pt was I w/ ADLs, IADLs, and functional mobility/transfers w/ use of SPC for community mobility as needed, (+) , and reports 1 fall PTA 2* L knee buckling  Upon evaluation, pt currently requires Min A-Supervision for overall ADLs and Min A for functional mobility/transfers 2* the following deficits impacting occupational performance: weakness, decreased strength, decreased balance, decreased tolerance and increased pain  These impairments, as well at pts steps to enter environment, difficulty performing ADLS and difficulty performing IADLS  limit pts ability to safely engage in all baseline areas of occupation, including grooming, bathing, dressing, toileting, functional mobility/transfers, community mobility, laundry , house maintenance, meal prep, cleaning, social participation  and leisure activities   Pt scored overall 55/100 on the Barthel Index  Based on the aforementioned OT evaluation, functional performance deficits, and assessments, pt has been identified as a moderate complexity evaluation  Pt to continue to benefit from continued acute OT services during hospital stay to address defined deficits and to maximize level of functional independence in the following Occupational Performance areas: grooming, bathing/shower, toilet hygiene, dressing, socialization, health maintenance, functional mobility, community mobility, clothing management, cleaning, meal prep, household maintenance, social participation and transfers to common household surfaces  From OT standpoint, recommend home with family support upon D/C   OT will continue to follow pt 3-5x/wk to address the following goals to  w/in 7-10 days:   Goals   Patient Goals To get better and go home   LTG Time Frame 7-10   Long Term Goal Please refer to LTGs listed below   Plan Treatment Interventions ADL retraining;Functional transfer training; Endurance training;Patient/family training;Equipment evaluation/education; Compensatory technique education; Energy conservation; Activityengagement   Goal Expiration Date 11/17/18   Treatment Day 0   OT Frequency 3-5x/wk   Recommendation   OT Discharge Recommendation Home with family support   OT - OK to Discharge Yes  (when medically cleared)   Barthel Index   Feeding 10   Bathing 0   Grooming Score 5   Dressing Score 5   Bladder Score 10   Bowels Score 10   Toilet Use Score 5   Transfers (Bed/Chair) Score 10   Mobility (Level Surface) Score 0   Stairs Score 0   Barthel Index Score 55   Modified [place] Scale   Modified [place] Scale 4        GOALS    1) Pt will improve activity tolerance to G for min 30 min txment sessions    2) Pt will complete bed mobility at a Mod I level w/ G balance/safety demonstrated     3) Pt will complete UB/LB dressing/self care w/ mod I using adaptive device and DME as needed    4) Pt will complete bathing w/ Mod I w/ use of AE and DME as needed    5) Pt will complete toileting w/ mod I w/ G hygiene/thoroughness using DME as needed    6) Pt will improve functional transfers to Mod I on/off all surfaces using DME as needed w/ G balance/safety     7) Pt will improve functional mobility during ADL/IADL/leisure tasks to Mod I using DME as needed w/ G balance/safety     8) Pt will participate in simulated IADL management task to increase independence to Mod I w/ G safety and endurance    9) Pt will engage in ongoing cognitive assessment w/ G participation w/ mod I to assist w/ safe d/c planning/recommendations    10) Pt will demonstrate G carryover of pt/caregiver education and training as appropriate w/ mod I w/o cues w/ good tolerance      Kourtney Toledo OTR/L

## 2018-11-07 NOTE — UTILIZATION REVIEW
Initial Clinical Review    Age/Sex: 70 y o  male    Surgery Date:    11/6/2018    Procedure: Pre-operative Diagnosis: Left knee degenerative joint disease      Post-operative Diagnosis: Left knee degenerative joint disease  Operative procedure: Left total knee arthroplasty    Anesthesia:    choice    Admission Orders: Date/Time/Statement:    OP  NC  Bed    11/6 11/08/18 0228  Inpatient Admission Once     Transfer Service: Orthopedics       Question Answer Comment   Admitting Physician NATACHA ROGERS    Level of Care Med Surg    Estimated length of stay More than 2 Midnights    Certification I certify that inpatient services are medically necessary for this patient for a duration of greater than two midnights  See H&P and MD Progress Notes for additional information about the patient's course of treatment  11/08/18 0228         No orders of the defined types were placed in this encounter  Vital Signs: /70 (BP Location: Right arm)   Pulse 70   Temp 98 5 °F (36 9 °C) (Temporal)   Resp 16   Ht 6' 1" (1 854 m)   Wt 97 1 kg (214 lb)   SpO2 96%   BMI 28 23 kg/m²     Diet:        Diet Orders            Start     Ordered    11/06/18 1618  Diet Regular; Regular House  Diet effective now     Question Answer Comment   Diet Type Regular    Regular Regular House    RD to adjust diet per protocol?  Yes        11/06/18 1617          Mobility:    As  ashley    DVT Prophylaxis:   SCD'S    Pain Control:   Pain Medications             gabapentin (NEURONTIN) 100 mg capsule Take 600 mg by mouth daily        neurovascular  Checks  q 4 hrs  [place]  PT/OT  Ferrous  Sulfate  Daily  Synthroid daily  pepcid  Daily  neurontin daily  Lisinopril daily  xarelto   Daily  IVF  100/hr  Cons  IM

## 2018-11-07 NOTE — PLAN OF CARE
Problem: Potential for Falls  Goal: Patient will remain free of falls  INTERVENTIONS:  - Assess patient frequently for physical needs  -  Identify cognitive and physical deficits and behaviors that affect risk of falls    -  [place] fall precautions as indicated by assessment   - Educate patient/family on patient safety including physical limitations  - Instruct patient to call for assistance with activity based on assessment  - Modify environment to reduce risk of injury  - Consider OT/PT consult to assist with strengthening/mobility   Outcome: Progressing

## 2018-11-07 NOTE — PHYSICAL THERAPY NOTE
PHYSICAL THERAPY NOTE          Patient Name: Marianna Mejía  FFWNU'I Date: 11/7/2018 11/07/18 1017   Pain Assessment   Pain Score 3   Pain Type Acute pain   Pain Location Knee   Pain Orientation Left   Restrictions/Precautions   Weight Bearing Precautions Per Order Yes   LLE Weight Bearing Per Order WBAT   Other Precautions Multiple lines; Fall Risk;Pain   General   Chart Reviewed Yes   Response to Previous Treatment Patient with no complaints from previous session  Family/Caregiver Present No   Cognition   Overall Cognitive Status WFL   Subjective   Subjective AGreeable to PT  Notes increased thigh pain  Some nausea with mobility  Bed Mobility   Sit to Supine 4  Minimal assistance   Additional items Assist x 1; Increased time required;Verbal cues;LE management   Additional Comments BP seated pre ambulation 151/76  P ambulation seated 156/79  Transfers   Sit to Stand 4  Minimal assistance   Additional items Assist x 1; Increased time required;Verbal cues;Armrests   Stand to Sit 4  Minimal assistance   Additional items Assist x 1; Increased time required;Verbal cues;Armrests   Additional Comments cues for hand and operative leg placement   Ambulation/Elevation   Gait pattern Improper Weight shift; Antalgic;Decreased foot clearance;Decreased L stance   Gait Assistance 4  Minimal assist   Additional items Assist x 1;Verbal cues; Tactile cues   Assistive Device Rolling walker   Distance Amb with RW 15'x1  Limited by nausea and dizziness  Balance   Static Sitting Good   Dynamic Sitting Fair +   Static Standing Fair   Dynamic Standing Fair -   Ambulatory Fair -   Endurance Deficit   Endurance Deficit Yes   Endurance Deficit Description fatigue, nausea     Activity Tolerance   Activity Tolerance Patient limited by fatigue;Patient limited by pain;Treatment limited secondary to medical complications (Comment)   Medical Staff Made Aware NurseJenifer  OT-drew   Nurse Made Aware yes   Exercises   TKR Supine;Sitting;10 reps;AAROM; Left  (omitted SAQ at this time 2* pain  HEP issued )   Assessment   Prognosis Good   Problem List Decreased strength;Decreased range of motion;Decreased endurance; Impaired balance;Decreased mobility; Decreased skin integrity;Orthopedic restrictions;Pain   Assessment Seen for progression of mobilty and strengthening  Priyanka needed for tranfers and ambulation  Limited ambultion 2* nausea and dizziness  /79 p ambulation despite  Initiated progression of TKR protocol ther ex  AAROM needed  Issued HEP  Omitted SAQ at this time 2* pain  Overall progressing, however will cont to work toward [employer] for safe d/c to home wiht PT  Barriers to Discharge Inaccessible home environment   Barriers to Discharge Comments 1+1 SARAI   Goals   Patient Goals get better and go home   STG Expiration Date 11/13/18   Treatment Day 2   Plan   Treatment/Interventions Functional transfer training;LE strengthening/ROM; Elevations; Therapeutic exercise; Endurance training;Patient/family training;Equipment eval/education; Bed mobility;Gait training; Compensatory technique education;Spoke to nursing;OT   Progress Slow progress, decreased activity tolerance   PT Frequency 7x/wk; Twice a day   Recommendation   Recommendation Home PT; Home with family support   Equipment Recommended (has DME)   PT - OK to Discharge No   Additional Comments to acheive PT goals for d/c to home     Zane Berg PT

## 2018-11-07 NOTE — ADDENDUM NOTE
Addendum  created 11/07/18 1248 by Rachana Perdomo CRNA    Anesthesia Intra Blocks edited, Sign clinical note

## 2018-11-07 NOTE — PROGRESS NOTES
Progress Note - Internal Medicine   Ryan Gallegos 70 y o  male MRN: [id number]  Unit/Bed#: E2 -01 Encounter: [id number]      Impression :    POD #1, s/p elective left total knee replacement by Dr Jayda Mehta  Advanced end-stage DJD, left knee, failed conservative treatments  Ambulatory dysfunction  Postoperative knee pain  Hyperuricemia, 9 2-3/03/2017, gout  Lumbar spinal stenosis with radiculopathy  Peripheral neuropathy  Hypothyroidism, status post partial thyroidectomy  History of hypertension -poorly controlled  Anticoagulated with Xarelto postoperatively  Mild leukocytosis, 15 15 probably reactive  Mild acute blood loss anemia, preoperative hemoglobin 16 3 now down to 13 5    Recommendation:    · Patient is hemodynamically stable as evident on the flow sheets  Patients  pain is well controlled with current analgesics  · Continue DVT prophylaxis as per surgical team with rivaroxaban  · Monitor for any redness/ drainage / swelling around site of surgery  · Recommend life style modifications and any high impact activities  · Continue PT /OT to improve endurance, range of motion, gait stabilization and use of assist device in a safe manner  · Full fall and orthostatic precautions  · Will order IV Vasotec to be utilized for severe hypertension if greater than 170  · Increase the dose of lisinopril to 10 mg b i d  Permitting his blood pressure is not too low  Subjective:     Patient seen and examined today  EMR and overnight events reviewed  Patient having moderate pain states that he took some oxycodone earlier this morning at 6 o'clock but is still having moderate to severe pain discussed with the nursing staff and additional medications were requested  He does not have any nausea vomiting  Is awaiting physical therapy to come and assist him  Denies any bleeding from his surgical site    Denies any chest pain palpitation diaphoresis no abdominal pain denies any dysuria frequency denies any numbness or tingling  Some weakness in his legs relating to his surgery  Patient denies any headache paresthesias or tingling     All other systems reviewed and are negative  OBJECTIVE:     Vitals:     Temp:  [97 5 °F (36 4 °C)-100 °F (37 8 °C)] 98 5 °F (36 9 °C)  HR:  [54-89] 70  Resp:  [10-23] 16  BP: (114-181)/(58-93) 181/87  SpO2:  [94 %-99 %] 96 %  Temp (24hrs), Av 2 °F (36 8 °C), Min:97 5 °F (36 4 °C), Max:100 °F (37 8 °C)  Current: Temperature: 98 5 °F (36 9 °C)    Intake/Output Summary (Last 24 hours) at 18 0753  Last data filed at 18 0718   Gross per 24 hour   Intake          4391 67 ml   Output             1951 ml   Net          2440 67 ml     Body mass index is 28 23 kg/m²  Physical Exam    Awake and alert not in any distress pallor JVP cyanosis negative no peripheral edema  Bilateral lungs are clear no rales or crackles  S1-S2 normal no murmurs  Abdomen is slightly protuberant from central obesity but no tenderness  Lower extremity no pedal edema    Clean dressing is noted on the left knee without any discharge drainage  Bilateral Homans sign is negative  Bilateral plantar flexion is intact facial symmetry is maintained speech is normal        Labs, Imaging, & Other studies:    All pertinent labs and imaging studies were personally reviewed    Results from last 7 days  Lab Units 18  0539   WBC Thousand/uL 15 15*   HEMOGLOBIN g/dL 13 5   PLATELETS Thousands/uL 180       Results from last 7 days  Lab Units 18  0539   POTASSIUM mmol/L 4 2   CHLORIDE mmol/L 102   CO2 mmol/L 27   BUN mg/dL 10   CREATININE mg/dL 1 02   EGFR ml/min/1 73sq m 75   CALCIUM mg/dL 8 2*           Current Meds:  Current Facility-Administered Medications   Medication Dose Route Frequency Provider Last Rate Last Dose    acetaminophen (TYLENOL) tablet 650 mg  650 mg Oral Q6H PRN Mala Edwards PA-C   650 mg at 18 4139    allopurinol (ZYLOPRIM) tablet 100 mg  100 mg Oral Daily Marlys Mera Michelle Malik MD        docusate sodium (COLACE) capsule 100 mg  100 mg Oral BID Prakash Duke PA-C   100 mg at 11/06/18 1741    famotidine (PEPCID) tablet 20 mg  20 mg Oral Daily Nestor Graff MD        ferrous sulfate tablet 325 mg  325 mg Oral Daily With Breakfast Prakash Duke PA-C   325 mg at 11/07/18 0636    gabapentin (NEURONTIN) capsule 600 mg  600 mg Oral Daily Nestor Graff MD        HYDROmorphone (DILAUDID) injection 0 5 mg  0 5 mg Intravenous Q2H PRN Prakash Duke PA-C        lactated ringers infusion  100 mL/hr Intravenous Continuous Prakash Duke PA-C 100 mL/hr at 11/06/18 2221 100 mL/hr at 11/06/18 2221    levothyroxine tablet 25 mcg  25 mcg Oral Early Morning Nestor Graff MD   25 mcg at 11/07/18 0635    lisinopril (ZESTRIL) tablet 10 mg  10 mg Oral Daily Nestor Graff MD        ondansetron [facility]) injection 4 mg  4 mg Intravenous Q8H PRN Prakash Duke PA-C        oxyCODONE (ROXICODONE) immediate release tablet 10 mg  10 mg Oral Q4H PRN Prakash Duke PA-C   10 mg at 11/06/18 1741    oxyCODONE (ROXICODONE) IR tablet 5 mg  5 mg Oral Q4H PRN Prakash Duke PA-C        rivaroxaban (XARELTO) tablet 10 mg  10 mg Oral Daily With FLOYD Fuchs        [facility]) tablet 8 6 mg  1 tablet Oral Daily Prakash Duke PA-C        sodium chloride 0 9 % infusion  125 mL/hr Intravenous Continuous Mundo Thomson DO   Stopped at 11/06/18 1410     Home Meds:  Prescriptions Prior to Admission   Medication    gabapentin (NEURONTIN) 100 mg capsule    Levothyroxine Sodium 25 MCG CAPS    lisinopril (ZESTRIL) 10 mg tablet    ranitidine (ZANTAC) 150 MG capsule    allopurinol (ZYLOPRIM) 100 mg tablet    colchicine (COLCRYS) 0 6 mg tablet       Continuous Infusions:    lactated ringers 100 mL/hr Last Rate: 100 mL/hr (11/06/18 2221)   sodium chloride 125 mL/hr Last Rate: Stopped (11/06/18 1410)       Invasive Devices     Peripheral Intravenous Line Peripheral IV 11/06/18 Left Wrist less than 1 day          Drain            Urethral Catheter Latex 16 Fr  less than 1 day                VTE Pharmacologic Prophylaxis: Xarelto as per Primary Ortho Team   VTE Mechanical Prophylaxis: sequential compression device    Portions of the record may have been created with voice recognition software  Occasional wrong word or "sound a like" substitutions may have occurred due to the inherent limitations of voice recognition software  Read the chart carefully and recognize, using context, where substitutions have occurred

## 2018-11-07 NOTE — PLAN OF CARE
Problem: OCCUPATIONAL THERAPY ADULT  Goal: Performs self-care activities at highest level of function for planned discharge setting  See evaluation for individualized goals  Treatment Interventions: ADL retraining, Functional transfer training, Endurance training, Patient/family training, Equipment evaluation/education, Compensatory technique education, Energy conservation, Activityengagement          See flowsheet documentation for full assessment, interventions and recommendations  Limitation: Decreased ADL status, Decreased endurance, Decreased self-care trans, Decreased high-level ADLs  Prognosis: Good  Assessment: Pt is a 70 y o  male seen for OT evaluation s/p adm to [facility] on 11/6/2018 s/p L TKR on 11/6/18  Pt w/ orders for WBAT L LE  Comorbidities affecting pts functional performance include a significant PMH of Arthritis, Basal cell carcinoma, hypothyroidism, Gout, GERD, HTN, Neuropathy in R foot, and spinal stenosis  Pt with active OT orders and activity orders for Up with assistance  Pt lives with spouse in a one level house with 1+1 SARAI  Pt reports spouse is able to assist as needed  At baseline, pt was I w/ ADLs, IADLs, and functional mobility/transfers w/ use of SPC for community mobility as needed, (+) , and reports 1 fall PTA 2* L knee buckling  Upon evaluation, pt currently requires Min A-Supervision for overall ADLs and Min A for functional mobility/transfers 2* the following deficits impacting occupational performance: weakness, decreased strength, decreased balance, decreased tolerance and increased pain   These impairments, as well at pts steps to enter environment, difficulty performing ADLS and difficulty performing IADLS  limit pts ability to safely engage in all baseline areas of occupation, including grooming, bathing, dressing, toileting, functional mobility/transfers, community mobility, laundry , house maintenance, meal prep, cleaning, social participation  and leisure activities   Pt scored overall 55/100 on the Barthel Index  Based on the aforementioned OT evaluation, functional performance deficits, and assessments, pt has been identified as a moderate complexity evaluation  Pt to continue to benefit from continued acute OT services during hospital stay to address defined deficits and to maximize level of functional independence in the following Occupational Performance areas: grooming, bathing/shower, toilet hygiene, dressing, socialization, health maintenance, functional mobility, community mobility, clothing management, cleaning, meal prep, household maintenance, social participation and transfers to common household surfaces  From OT standpoint, recommend home with family support upon D/C   OT will continue to follow pt 3-5x/wk to address the following goals to  w/in 7-10 days:     OT Discharge Recommendation: Home with family support  OT - OK to Discharge: Yes (when medically cleared)

## 2018-11-08 VITALS
SYSTOLIC BLOOD PRESSURE: 155 MMHG | DIASTOLIC BLOOD PRESSURE: 84 MMHG | HEART RATE: 90 BPM | BODY MASS INDEX: 28.36 KG/M2 | TEMPERATURE: 100 F | HEIGHT: 73 IN | WEIGHT: 214 LBS | RESPIRATION RATE: 16 BRPM | OXYGEN SATURATION: 96 %

## 2018-11-08 LAB
ANION GAP SERPL CALCULATED.3IONS-SCNC: 8 MMOL/L (ref 4–13)
BUN SERPL-MCNC: 8 MG/DL (ref 5–25)
CALCIUM SERPL-MCNC: 8.6 MG/DL (ref 8.3–10.1)
CHLORIDE SERPL-SCNC: 103 MMOL/L (ref 100–108)
CO2 SERPL-SCNC: 27 MMOL/L (ref 21–32)
CREAT SERPL-MCNC: 0.89 MG/DL (ref 0.6–1.3)
GFR SERPL CREATININE-BSD FRML MDRD: 87 ML/MIN/1.73SQ M
GLUCOSE SERPL-MCNC: 142 MG/DL (ref 65–140)
POTASSIUM SERPL-SCNC: 3.5 MMOL/L (ref 3.5–5.3)
SODIUM SERPL-SCNC: 138 MMOL/L (ref 136–145)

## 2018-11-08 PROCEDURE — 97110 THERAPEUTIC EXERCISES: CPT

## 2018-11-08 PROCEDURE — 97530 THERAPEUTIC ACTIVITIES: CPT

## 2018-11-08 PROCEDURE — 97116 GAIT TRAINING THERAPY: CPT

## 2018-11-08 PROCEDURE — 80048 BASIC METABOLIC PNL TOTAL CA: CPT | Performed by: PHYSICIAN ASSISTANT

## 2018-11-08 RX ORDER — ACETAMINOPHEN 325 MG/1
TABLET ORAL
Qty: 30 TABLET | Refills: 0 | Status: SHIPPED | OUTPATIENT
Start: 2018-11-08

## 2018-11-08 RX ORDER — HYDROCODONE BITARTRATE AND ACETAMINOPHEN 5; 325 MG/1; MG/1
1 TABLET ORAL EVERY 4 HOURS PRN
Status: DISCONTINUED | OUTPATIENT
Start: 2018-11-08 | End: 2018-11-08 | Stop reason: HOSPADM

## 2018-11-08 RX ORDER — HYDROCODONE BITARTRATE AND ACETAMINOPHEN 5; 325 MG/1; MG/1
1 TABLET ORAL EVERY 4 HOURS PRN
Qty: 50 TABLET | Refills: 0 | Status: SHIPPED | OUTPATIENT
Start: 2018-11-08 | End: 2018-11-18

## 2018-11-08 RX ORDER — DOCUSATE SODIUM 100 MG/1
100 CAPSULE, LIQUID FILLED ORAL 2 TIMES DAILY
Qty: 10 CAPSULE | Refills: 0
Start: 2018-11-08

## 2018-11-08 RX ORDER — FERROUS SULFATE 325(65) MG
325 TABLET ORAL
Refills: 0
Start: 2018-11-08

## 2018-11-08 RX ADMIN — DOCUSATE SODIUM 100 MG: 100 CAPSULE, LIQUID FILLED ORAL at 09:36

## 2018-11-08 RX ADMIN — ALLOPURINOL 100 MG: 100 TABLET ORAL at 09:36

## 2018-11-08 RX ADMIN — GABAPENTIN 600 MG: 300 CAPSULE ORAL at 09:36

## 2018-11-08 RX ADMIN — SENNOSIDES 8.6 MG: 8.6 TABLET, FILM COATED ORAL at 09:37

## 2018-11-08 RX ADMIN — LEVOTHYROXINE SODIUM 25 MCG: 25 TABLET ORAL at 05:13

## 2018-11-08 RX ADMIN — ENALAPRILAT 0.62 MG: 1.25 INJECTION INTRAVENOUS at 05:15

## 2018-11-08 RX ADMIN — FERROUS SULFATE TAB 325 MG (65 MG ELEMENTAL FE) 325 MG: 325 (65 FE) TAB at 09:37

## 2018-11-08 RX ADMIN — FAMOTIDINE 20 MG: 20 TABLET ORAL at 09:37

## 2018-11-08 RX ADMIN — LISINOPRIL 10 MG: 10 TABLET ORAL at 09:37

## 2018-11-08 NOTE — PHYSICAL THERAPY NOTE
Physical Therapy Progress Note     11/08/18 1417   Pain Assessment   Pain Assessment 0-10   Pain Score 1   Pain Type Surgical pain   Pain Location Knee   Pain Orientation Left   Hospital Pain Intervention(s) Ambulation/increased activity;Repositioned;Cold applied   Response to Interventions Tolerated  Restrictions/Precautions   Weight Bearing Precautions Per Order Yes   LLE Weight Bearing Per Order WBAT   Other Precautions Fall Risk;Pain   General   Chart Reviewed Yes   Response to Previous Treatment Patient with no complaints from previous session  Family/Caregiver Present Yes   Subjective   Subjective Willing to participate in therapy this PM    Endurance Deficit   Endurance Deficit No   Activity Tolerance   Activity Tolerance Patient tolerated treatment well   Nurse Made Aware Yes   Exercises   TKR Supine;10 reps;AROM; Bilateral   Assessment   Prognosis Good   Problem List Decreased strength;Decreased range of motion;Decreased endurance; Impaired balance;Decreased mobility; Decreased skin integrity;Orthopedic restrictions;Pain   Assessment Pt  supine in bed upon my arrival  Pt  spouse present for d/c home, session focus on education provided for proper transfers technique/as well as stair training  Both report good understanding at this time with no questions  Able to complete HEP supine in bed with cues provided for proper completion  Repositioned supine in bed with ice applied and towel roll placed at end of treatment session  Pt  has completed all his PT goals and is safe for d/c home with HHPT and family support as needed when medically stable  Goals   Patient Goals To go home  STG Expiration Date 11/13/18   Treatment Day 5   Plan   Treatment/Interventions Functional transfer training;LE strengthening/ROM; Therapeutic exercise; Endurance training;Bed mobility;Spoke to nursing;Spoke to case management; Family   Progress Improving as expected   PT Frequency 7x/wk; Twice a day   Recommendation Recommendation Home PT; Home with family support   Equipment Recommended Other (Comment)  (has DME)   PT - OK to Discharge Yes  (if d/c when medically stable )     Sherry Rosenbaum, RENEA

## 2018-11-08 NOTE — PHYSICIAN ADVISOR
Current patient class: Outpatient Surgery  The patient is currently on Hospital Day: 3 at [facility]      The patient was admitted to the hospital at N/A on N/A for the following diagnosis:  Primary osteoarthritis of left knee [M17 12]       There is documentation in the medical record of an expected length of stay of at least 2 midnights  The patient is therefore expected to satisfy the 2 midnight benchmark and given the 2 midnight presumption is appropriate for INPATIENT ADMISSION  Given this expectation of a satisfying stay, CMS instructs us that the patient is most often appropriate for inpatient admission under part A provided medical necessity is documented in the chart  After review of the relevant documentation, labs, vital signs and test results, the patient is appropriate for INPATIENT ADMISSION  Admission to the hospital as an inpatient is a complex decision making process which requires the practitioner to consider the patients presenting complaint, history and physical examination and all relevant testing  With this in mind, in this case, the patient was deemed appropriate for INPATIENT ADMISSION  After review of the documentation and testing available at the time of the admission I concur with this clinical determination of medical necessity  Rationale is as follows: The patient is a 70 yrs old Male who presented to the ED at 11/6/2018  6:59 AM with a chief complaint of No chief complaint on file      The patients vitals on arrival were ED Triage Vitals   Temperature Pulse Respirations Blood Pressure SpO2   10/30/18 1146 10/30/18 1146 10/30/18 1146 10/30/18 1146 10/30/18 1146   97 8 °F (36 6 °C) 71 16 146/82 95 %      Temp Source Heart Rate Source Patient Position - Orthostatic VS BP Location FiO2 (%)   10/30/18 1146 11/06/18 0814 11/06/18 1830 11/06/18 1830 --   Tympanic Monitor Lying Right arm       Pain Score       11/06/18 1027       2           Past Medical History:   Diagnosis Date    Arthritis     Basal cell carcinoma in situ of skin     on back    Disease of thyroid gland     hypothyroid    GERD (gastroesophageal reflux disease)     Gout     Gout     Hypertension     Neuropathy     right foot    Spinal stenosis, lumbar     Wears dentures     upper full    Wears hearing aid     both ears     Past Surgical History:   Procedure Laterality Date    BACK SURGERY      spinal fusion    CATARACT EXTRACTION Bilateral     COLONOSCOPY      EYE SURGERY Bilateral     retinal dtetachment    [facility] TOTAL KNEE ARTHROPLASTY Left 11/6/2018    Procedure: ARTHROPLASTY KNEE TOTAL;  Surgeon: Georgi Figueroa MD;  Location: [facility];  Service: Orthopedics    THYROIDECTOMY, PARTIAL      WRIST SURGERY             Consults have been placed to:   IP CONSULT TO CASE MANAGEMENT  IP CONSULT TO INTERNAL MEDICINE    Vitals:    11/07/18 1700 11/07/18 1845 11/07/18 1926 11/07/18 2300   BP: 157/90 166/70 (!) 175/79 155/80   BP Location: Right arm Right arm Right arm Right arm   Pulse: 92  101 100   Resp:   16 18   Temp:   99 6 °F (37 6 °C) 100 °F (37 8 °C)   TempSrc:   Temporal Temporal   SpO2:   94% 93%   Weight:       Height:           Most recent labs:    Recent Labs      11/07/18   0539   WBC  15 15*   HGB  13 5   HCT  39 0   PLT  180   K  4 2   CALCIUM  8 2*   BUN  10   CREATININE  1 02       Scheduled Meds:  Current Facility-Administered Medications:  acetaminophen 650 mg Oral Q6H PRN Oswaldo Murillo PA-C    allopurinol 100 mg Oral Daily Andrea Castellon MD    docusate sodium 100 mg Oral BID Oswaldo Murillo PA-C    enalaprilat 0 625 mg Intravenous 4x Daily PRN Andrea Castellon MD    famotidine 20 mg Oral Daily Andrea Castellon MD    ferrous sulfate 325 mg Oral Daily With Breakfast Oswaldo Murillo PA-C    gabapentin 600 mg Oral Daily Andrea Castellon MD    HYDROmorphone 0 5 mg Intravenous Q2H PRN Oswaldo Murillo PA-C    lactated ringers 100 mL/hr Intravenous Continuous Roxianne Hoots India Hernández PA-C Last Rate: Stopped (11/07/18 1138)   levothyroxine 25 mcg Oral Early Morning Khurram Wallace MD    lisinopril 10 mg Oral BID Khurram Wallace MD    ondansetron 4 mg Intravenous Q8H PRN Aliza Medina PA-C    oxyCODONE 10 mg Oral Q4H PRN Aliza Medina PA-C    oxyCODONE 5 mg Oral Q4H PRN Aliza Medina PA-C    rivaroxaban 10 mg Oral Daily With Dinner Aliza Medina PA-C    senna 1 tablet Oral Daily Aliza Medina PA-C    sodium chloride 125 mL/hr Intravenous Continuous Alexandr Mackenzie DO Last Rate: Stopped (11/06/18 1410)     Continuous Infusions:  lactated ringers 100 mL/hr Last Rate: Stopped (11/07/18 1138)   sodium chloride 125 mL/hr Last Rate: Stopped (11/06/18 1410)     PRN Meds:   acetaminophen    enalaprilat    HYDROmorphone    ondansetron    oxyCODONE    oxyCODONE    Surgical procedures (if appropriate):  Procedure(s):  ARTHROPLASTY KNEE TOTAL

## 2018-11-08 NOTE — PLAN OF CARE
Problem: PHYSICAL THERAPY ADULT  Goal: Performs mobility at highest level of function for planned discharge setting  See evaluation for individualized goals  Treatment/Interventions: Functional transfer training, LE strengthening/ROM, Elevations, Therapeutic exercise, Endurance training, Patient/family training, Equipment eval/education, Bed mobility, Gait training, Compensatory technique education, Continued evaluation, Spoke to nursing  Equipment Recommended:  (has RW, BSC)       See flowsheet documentation for full assessment, interventions and recommendations  Outcome: Progressing  Prognosis: Good  Problem List: Decreased strength, Decreased range of motion, Decreased endurance, Impaired balance, Decreased mobility  Assessment: Pt  supine in bed upon my arrival  Candelario Frank to participate in therapy this AM  Performance of HEP supine/seated at EOB  Progressed with transfers being able to complete practicing proper technique with no noted LOB  Taken to steps via chair transport  Progressed to stair training, being able to complete practicing proper technique with no noted LOB  Taken back to room and continued with an increased amb  trial with use of RW and standbyA of therapist with cues provided for LE sequencing  Continuing having difficulty performing reciprocal gait pattern at this time, continues with step through gait pattern  After additional amb  trial returned to room and repositioned supine in bed at end of treatment session with ice applied and towel roll placed  Pt  has completed his PT goals and is safe for d/c home with HHPT and family support as needed when medicallly stable  Barriers to Discharge: Inaccessible home environment  Barriers to Discharge Comments: SARAI  Recommendation: Home PT, Home with family support     PT - OK to Discharge: Yes (if d/c when medically stable )    See flowsheet documentation for full assessment

## 2018-11-08 NOTE — PROGRESS NOTES
Orthopedic Total Knee Progress Note  (OAA)    ASSESSMENT & PLAN:  Status post- LEFT total knee arthroplasty:  LOS: 0 days  POD #2  Doing well postoperatively  Pain Relief: Pt comfortable and pain controlled  Continues current post-op course  Activity: up with assistance  Working with PT / OT  Weight Bearing: WBAT      SUBJECTIVE:    Systemic or Specific Complaints: Pain medications covering pain  OBJECTIVE:  Vital signs in last 24 hours:  Temp:  [98 5 °F (36 9 °C)-100 °F (37 8 °C)] 98 7 °F (37 1 °C)  HR:  [] 84  Resp:  [16-18] 18  BP: (151-183)/(70-92) 177/81  General: alert, appears stated age and cooperative   Neurovascular: Intact    Wound: No Erythema and Wound Intact   Range of Motion: Normal for post surgery   DVT Exam: Negative Rosana's sign  No cords or calf tenderness  No significant calf/ankle edema  Data Review:  CBC:   Lab Results   Component Value Date    WBC 15 15 (H) 11/07/2018    WBC 9 04 04/17/2015    RBC 4 34 11/07/2018    RBC 5 33 04/17/2015    HGB 13 5 11/07/2018    HGB 16 7 04/17/2015    HCT 39 0 11/07/2018    HCT 47 0 04/17/2015     11/07/2018     04/17/2015     Plan:  DVT Prophylaxis -  mg BID x 6 weeks  Mobilize with PT / OT  D/C Planning for Disposition  Got nauseated from oxycodone    Will try vicodin    Aliza Medina PA-C  Date: 11/8/2018  Time: 7:09 AM

## 2018-11-08 NOTE — PHYSICAL THERAPY NOTE
Physical Therapy Progress Note     11/08/18 0915   Pain Assessment   Pain Assessment 0-10   Pain Score 1   Pain Type Surgical pain   Pain Location Knee   Pain Orientation Left   Hospital Pain Intervention(s) Ambulation/increased activity;Repositioned;Cold applied   Response to Interventions Tolerated  Restrictions/Precautions   Weight Bearing Precautions Per Order Yes   LLE Weight Bearing Per Order WBAT   Other Precautions Fall Risk;Pain   General   Chart Reviewed Yes   Response to Previous Treatment Patient with no complaints from previous session  Family/Caregiver Present No   Subjective   Subjective Willing to participate in therapy this AM    Bed Mobility   Supine to Sit 4  Minimal assistance   Additional items Assist x 1;HOB elevated; Bedrails;Leg ; Increased time required;Verbal cues;LE management   Sit to Supine 4  Minimal assistance   Additional items Assist x 1;Bedrails;Leg ; Increased time required;Verbal cues;LE management   Transfers   Sit to Stand 5  Supervision   Additional items Assist x 1;Bedrails; Increased time required;Verbal cues   Stand to Sit 5  Supervision   Additional items Assist x 1;Bedrails; Increased time required;Verbal cues   Car transfer 5  Supervision   Additional items Assist x 1; Armrests; Increased time required;Verbal cues   Ambulation/Elevation   Gait pattern Decreased foot clearance; Improper Weight shift; Antalgic;Decreased L stance; Short stride  (step through gait pattern)   Gait Assistance 5  Supervision   Additional items Assist x 1;Verbal cues; Tactile cues   Assistive Device Rolling walker   Distance 120'   Stair Management Assistance 5  Supervision   Additional items Assist x 1;Verbal cues; Tactile cues   Stair Management Technique Two rails; Step to pattern; Foreward;Nonreciprocal   Number of Stairs 10   Balance   Static Sitting Good   Dynamic Sitting Fair +   Static Standing Fair   Dynamic Standing [address]   Endurance Deficit   Endurance Deficit No Activity Tolerance   Activity Tolerance Patient tolerated treatment well   Nurse Made Aware Yes   Exercises   TKR Supine;Sitting;10 reps;AAROM; Bilateral   Assessment   Prognosis Good   Problem List Decreased strength;Decreased range of motion;Decreased endurance; Impaired balance;Decreased mobility   Assessment Pt  supine in bed upon my arrival  Arely Anderson to participate in therapy this AM  Performance of HEP supine/seated at EOB  Progressed with transfers being able to complete practicing proper technique with no noted LOB  Taken to steps via chair transport  Progressed to stair training, being able to complete practicing proper technique with no noted LOB  Taken back to room and continued with an increased amb  trial with use of RW and standbyA of therapist with cues provided for LE sequencing  Continuing having difficulty performing reciprocal gait pattern at this time, continues with step through gait pattern  After additional amb  trial returned to room and repositioned supine in bed at end of treatment session with ice applied and towel roll placed  Pt  has completed his PT goals and is safe for d/c home with HHPT and family support as needed when medicallly stable  Goals   Patient Goals To go home  STG Expiration Date 11/13/18   Treatment Day 4   Plan   Treatment/Interventions Functional transfer training;LE strengthening/ROM; Elevations; Therapeutic exercise; Endurance training;Bed mobility;Gait training;Spoke to nursing;Spoke to case management   Progress Progressing toward goals   PT Frequency 7x/wk; Twice a day   Recommendation   Recommendation Home PT; Home with family support   Equipment Recommended Other (Comment)  (has DME)   PT - OK to Discharge Yes  (if d/c when medically stable )     Ashlie Ho PTA

## 2018-11-08 NOTE — SOCIAL WORK
CM met with patient at bedside to address discharge needs  Patient reports living in a [facility] style house with spouse  Patient was independent with ADLs and functional mobility PTA  Patient reports having a SPC that he was using occasionally in the community  Patient also has a RW and raised toilet seat; no DME needed for discharge  Patient is recommended for Home PT, referrals submitted  Patient drives, reports spouse will be available at discharge to transport  Patient uses [facility] in [place], [place] made patient aware of Meds to Bed program offered through [address]; patient declined this service at this time and would prefer to use his pharmacy  Help/support reported  Patient made aware of CM's name, number and role  No other needs at present  CM to follow as needed

## 2018-11-27 NOTE — DISCHARGE SUMMARY
DISCHARGE SUMMARY      Patient Name: Nataliia Suárez  Patient MRN: [id number]  Admitting Provider: Sydnie Dominguez MD  Discharging Provider: No att  providers found  Primary Care Physician at Discharge: Celeste Prititracy [place] [phone]  Admission Date: 11/6/2018       Discharge Date: 11/8/2018    Admission Diagnosis  Primary osteoarthritis of left knee [M17 12]    Discharge Diagnoses  Principal Problem:    Localized osteoarthritis of left knee  Resolved Problems:    * No resolved hospital problems  [facility] AND [facility] Course  Aurora Gallegos was admitted to [place] [address] on 11/6/2018, with diagnosis of osteoarthritis in his Leftknee  On the day of admission, he was brought to surgery, successfully underwent a Left knee replacement  He tolerated the procedure well  Following surgery, he was taken to the recovery room, at which time, there was a consult placed for Dr Sriram Vega for the patient's medical management  After a short stay in the recovery room, he was transferred to the orthopedic floor at which time, he was resumed on his routine home medications per the hospitalist group  On postop day #1, he was able to start some physical therapy and was able to tolerate it well  At this time, he was in stable condition, showing no sign of deep vein thrombosis or pulmonary embolism  Incision was healing well at the time and showed no signs of infection  DISCHARGE DIAGNOSIS: Primary osteoarthritis of left knee [M17 12]  He is now status post Left total knee replacement, which he underwent during the hospital stay  Medications  See after visit summary for reconciled discharge medications provided to patient and family  Allergies  Allergies   Allergen Reactions    Aspirin Hives     palpitations       Outpatient Follow-Up  No future appointments        Consults   Medical Management - Dr Kruger Bodily Results   Component Value Date    HCT 39 0 11/07/2018     Lab Results   Component Value Date    GLUCOSE 79 04/17/2015    CALCIUM 8 6 11/08/2018     04/17/2015    K 3 5 11/08/2018    CO2 27 11/08/2018     11/08/2018    BUN 8 11/08/2018    CREATININE 0 89 11/08/2018        Discharge Disposition  home    Operative Procedures Performed  Procedure(s):  ARTHROPLASTY KNEE TOTAL    Discharge Instructions  ASA x 6 weeks for DVT prophylaxis   WBAT  Follow up x 3 weeks in the office  Suture ends trimmed POD #10 to skin level  ?     Aliza Medina PA-C  5:47 PM, 11/27/2018

## 2018-12-03 NOTE — DISCHARGE SUMMARY
DISCHARGE SUMMARY  ? Patient Name: Alyson Church  Patient MRN: [id number]  Admitting Provider: Daniel Escobar MD  Discharging Provider: No att  providers found  Primary Care Physician at Discharge: cL Joshua [place] [phone]   Admission Date: 11/6/2018   Discharge Date: 11/8/2018  Admission Diagnosis   Primary osteoarthritis of left knee [M17 12]  Discharge Diagnoses  Primary osteoarthritis of left knee [facility] Course  Patient underwent uncomplicated left total knee arthroplasty  POD #1 Doing well, Working with PT  OOB as tolerates  POD #2 Patient continued with mobilization with PT/OT    Medications  See after visit summary for reconciled discharge medications provided to patient and family  Allergies  Allergies   Allergen Reactions    Aspirin Hives     palpitations     Outpatient Follow-Up  No future appointments  Discharge Disposition  Stable home  Consults   Medical Management - LAMA  Operative Procedures Performed  Procedure(s):  ARTHROPLASTY KNEE TOTAL      Discharge instructions:  ASA x 6 weeks for DVT prophylaxis   WBAT  Follow up x 3 weeks in the office  Suture ends trimmed POD #10 to skin level  ?   Daniel Escobar MD

## 2018-12-07 ENCOUNTER — APPOINTMENT (OUTPATIENT)
Dept: PHYSICAL THERAPY | Facility: CLINIC | Age: 68
End: 2018-12-07
Payer: MEDICARE

## 2018-12-10 ENCOUNTER — EVALUATION (OUTPATIENT)
Dept: PHYSICAL THERAPY | Facility: CLINIC | Age: 68
End: 2018-12-10
Payer: MEDICARE

## 2018-12-10 ENCOUNTER — TRANSCRIBE ORDERS (OUTPATIENT)
Dept: PHYSICAL THERAPY | Facility: CLINIC | Age: 68
End: 2018-12-10

## 2018-12-10 DIAGNOSIS — Z47.1 AFTERCARE FOLLOWING LEFT KNEE JOINT REPLACEMENT SURGERY: Primary | ICD-10-CM

## 2018-12-10 DIAGNOSIS — Z96.652 AFTERCARE FOLLOWING LEFT KNEE JOINT REPLACEMENT SURGERY: Primary | ICD-10-CM

## 2018-12-10 PROCEDURE — 97161 PT EVAL LOW COMPLEX 20 MIN: CPT | Performed by: PHYSICAL THERAPIST

## 2018-12-10 PROCEDURE — 97110 THERAPEUTIC EXERCISES: CPT | Performed by: PHYSICAL THERAPIST

## 2018-12-10 PROCEDURE — 97140 MANUAL THERAPY 1/> REGIONS: CPT | Performed by: PHYSICAL THERAPIST

## 2018-12-10 PROCEDURE — G8979 MOBILITY GOAL STATUS: HCPCS | Performed by: PHYSICAL THERAPIST

## 2018-12-10 PROCEDURE — 97010 HOT OR COLD PACKS THERAPY: CPT | Performed by: PHYSICAL THERAPIST

## 2018-12-10 PROCEDURE — G8978 MOBILITY CURRENT STATUS: HCPCS | Performed by: PHYSICAL THERAPIST

## 2018-12-10 NOTE — PROGRESS NOTES
PT Evaluation     Today's date: 12/10/2018  Patient name: Winter Clemons  : 1950  MRN: [id number]  Referring provider: Rey Mattson MD  Dx:   Encounter Diagnosis     ICD-10-CM    1  Aftercare following left knee joint replacement surgery Z47 1     Z96 652                   Assessment  Assessment details:   CURRENT FUNCTIONAL STATUS    Standing/ADL tolerance 60 minutes  Walking tolerance 1/4 mile  Ascends/descends stairs reciprocally inconsistantly  Difficulty arising from sitting: Minimal  Able to squat 30 degrees  Ability to dress lower extremities: Good     SHORT TERM GOALS (2 WEEKS)    Increase knee extension AROM and PROM 5-10 degrees  Increase knee strength 3-5 lbs in all weak areas  Girth MP: 44 cm  Decrease pain to 0-1/10  Standing/ADL tolerance 90 minutes  Walking tolerance 1/2 mile  Ascends/descends stairs reciprocally consistantly  Difficulty arising from sitting: None  Able to squat 45 degrees  LONG TERM GOALS (DISCHARGE)    Knee AROM: 0-120 deg  Knee PROM: 0-125 deg  Knee Strength: E=70 lbs, F=49 lbs  Girth MP: 43 cm  Decrease pain to 0-1/10  Standing/ADL tolerance 2 hours  Walking tolerance 1 mile  Ascends/descends stairs reciprocally consistantly  Difficulty arising from sitting: None  Able to squat 60 degrees  Understanding of Dx/Px/POC: excellent  Goals  See assessment details above  Plan  Plan details: Winter Clemons is a 70 y o  male presenting to PT with pain, decreased range of motion, decreased strength, and decreased tolerance to activity  This patient would benefit from skilled PT services to address these issues and to maximize function   Thank you for the referral     Patient would benefit from: skilled physical therapy  Planned modality interventions: unattended electrical stimulation and cryotherapy  Planned therapy interventions: manual therapy, gait training, therapeutic exercise and therapeutic activities  Frequency: 3x week  Duration in weeks: 4        Subjective Evaluation    History of Present Illness  Mechanism of injury: CC: Left knee pain, swelling, and stiffness  HPI: The patient had left knee pain for several years without any history of injury  He had a TKR performed on 2018, and then 2-3 weeks of home PT  He is no longer using an AD  He is retired and enjoys traveling, woodworking, and golfing  Pain  Current pain ratin  At best pain ratin  At worst pain rating: 3    Patient Goals  Patient goals for therapy: decreased pain, decreased edema, increased strength, increased motion and return to sport/leisure activities          Objective     Observations     Additional Observation Details  Patient ambulates with a mild limp without an AD  Mild loss of terminal knee extension is noted  Incision is healing well, bruising present in the distal shin  Mild knee swelling is noted  General Comments     Knee Comments  CURRENT OBJECTIVE MEASUREMENTS    Knee AROM: - deg  Knee PROM: - deg  Knee Strength: E=48 lbs, F=46 lbs  Girth MP: 44 5 cm               Precautions: None    Daily Treatment Diary     Manual  12/10        PROM RK                 Exercise Diary          QS         SLR         SAQ         Heel slides with Strap         T-Band Press         T-Band Hamstring         T-Band TKE         LAQ         Mini Squats         Steps Forward         Steps Lateral 6" 3/10        PYR HAM: S 9  P 5, C 7 P 4 3/10        PYR QUAD:   S 4, P 4, C 2 P 1 3/10        Leg Press:   S 11 P 4 3/10        XO TKE P 2 3/10        Hack Squat         SLS         NuStep: S 11, A 0 L 7 10'        Bike: S         Treadmill                  Modalities          CP 15'

## 2018-12-10 NOTE — LETTER
December 10, 2018    William Clifford MD  [facility]    Patient: Sameera Gonzalez   YOB: 1950   Date of Visit: 12/10/2018     Encounter Diagnosis     ICD-10-CM    1  Aftercare following left knee joint replacement surgery Z47 1     Z92 140        Dear Dr Ana Olea:    Please review the attached Plan of Care from [facility] Rosy's recent visit  Please verify that you agree therapy should continue by signing the attached document and sending it back to our office  If you have any questions or concerns, please don't hesitate to call  Sincerely,    Fatemeh Lester, PT      Referring Provider:      I certify that I have read the below Plan of Care and certify the need for these services furnished under this plan of treatment while under my care  William Clifford MD  [facility]: [phone]          PT Evaluation     Today's date: 12/10/2018  Patient name: Sameera Gonzalez  : 1950  MRN: [id number]  Referring provider: Gomez Ivory MD  Dx:   Encounter Diagnosis     ICD-10-CM    1  Aftercare following left knee joint replacement surgery Z47 1     Z96 652                   Assessment  Assessment details:   CURRENT FUNCTIONAL STATUS    Standing/ADL tolerance 60 minutes  Walking tolerance 1/4 mile  Ascends/descends stairs reciprocally inconsistantly  Difficulty arising from sitting: Minimal  Able to squat 30 degrees  Ability to dress lower extremities: Good     SHORT TERM GOALS (2 WEEKS)    Increase knee extension AROM and PROM 5-10 degrees  Increase knee strength 3-5 lbs in all weak areas  Girth MP: 44 cm  Decrease pain to 0-1/10  Standing/ADL tolerance 90 minutes  Walking tolerance 1/2 mile  Ascends/descends stairs reciprocally consistantly  Difficulty arising from sitting: None  Able to squat 45 degrees  LONG TERM GOALS (DISCHARGE)    Knee AROM: 0-120 deg    Knee PROM: 0-125 deg   Knee Strength: E=70 lbs, F=49 lbs  Girth MP: 43 cm  Decrease pain to 0-1/10  Standing/ADL tolerance 2 hours  Walking tolerance 1 mile  Ascends/descends stairs reciprocally consistantly  Difficulty arising from sitting: None  Able to squat 60 degrees  Understanding of Dx/Px/POC: excellent  Goals  See assessment details above  Plan  Plan details: Gavin Vieira is a 70 y o  male presenting to PT with pain, decreased range of motion, decreased strength, and decreased tolerance to activity  This patient would benefit from skilled PT services to address these issues and to maximize function  Thank you for the referral     Patient would benefit from: skilled physical therapy  Planned modality interventions: unattended electrical stimulation and cryotherapy  Planned therapy interventions: manual therapy, gait training, therapeutic exercise and therapeutic activities  Frequency: 3x week  Duration in weeks: 4        Subjective Evaluation    History of Present Illness  Mechanism of injury: CC: Left knee pain, swelling, and stiffness  HPI: The patient had left knee pain for several years without any history of injury  He had a TKR performed on 2018, and then 2-3 weeks of home PT  He is no longer using an AD  He is retired and enjoys traveling, woodworking, and golfing  Pain  Current pain ratin  At best pain ratin  At worst pain rating: 3    Patient Goals  Patient goals for therapy: decreased pain, decreased edema, increased strength, increased motion and return to sport/leisure activities          Objective     Observations     Additional Observation Details  Patient ambulates with a mild limp without an AD  Mild loss of terminal knee extension is noted  Incision is healing well, bruising present in the distal shin  Mild knee swelling is noted  General Comments     Knee Comments  CURRENT OBJECTIVE MEASUREMENTS    Knee AROM: - deg  Knee PROM: - deg     Knee Strength: E=48 lbs, F=46 lbs  Girth MP: 44 5 cm               Precautions: None    Daily Treatment Diary     Manual  12/10        PROM RK                 Exercise Diary          QS         SLR         SAQ         Heel slides with Strap         T-Band Press         T-Band Hamstring         T-Band TKE         LAQ         Mini Squats         Steps Forward         Steps Lateral 6" 3/10        PYR HAM: S 9  P 5, C 7 P 4 3/10        PYR QUAD:   S 4, P 4, C 2 P 1 3/10        Leg Press:   S 11 P 4 3/10        XO TKE P 2 3/10        Hack Squat         SLS         NuStep: S 11, A 0 L 7 10'        Bike: S         Treadmill                  Modalities          CP 15'

## 2018-12-11 ENCOUNTER — OFFICE VISIT (OUTPATIENT)
Dept: PHYSICAL THERAPY | Facility: CLINIC | Age: 68
End: 2018-12-11
Payer: MEDICARE

## 2018-12-11 DIAGNOSIS — Z47.1 AFTERCARE FOLLOWING LEFT KNEE JOINT REPLACEMENT SURGERY: Primary | ICD-10-CM

## 2018-12-11 DIAGNOSIS — Z96.652 AFTERCARE FOLLOWING LEFT KNEE JOINT REPLACEMENT SURGERY: Primary | ICD-10-CM

## 2018-12-11 PROCEDURE — 97140 MANUAL THERAPY 1/> REGIONS: CPT

## 2018-12-11 PROCEDURE — 97010 HOT OR COLD PACKS THERAPY: CPT

## 2018-12-11 PROCEDURE — 97110 THERAPEUTIC EXERCISES: CPT

## 2018-12-11 NOTE — PROGRESS NOTES
Daily Note     Today's date: 2018  Patient name: Thomas Lutz  : 1950  MRN: [id number]  Referring provider: Andria Oshea MD  Dx:   Encounter Diagnosis     ICD-10-CM    1  Aftercare following left knee joint replacement surgery Z47 1     Z96 652                   Subjective: Patient reports his knee felt good post initial session  He has slight soreness on the medial aspect  Objective: See treatment diary below  Several progressions made to exercise program       Assessment: Tolerated treatment well   Patient exhibited good technique with therapeutic exercises      Plan: Continue per plan of care         Precautions: None     Daily Treatment Diary      Manual  12/10  12/11           PROM RK  LF                           Exercise Diary                QS               SLR               SAQ               Heel slides with Strap               T-Band Press               T-Band Hamstring               T-Band TKE               LAQ               Mini Squats    2/10           Steps Forward               Steps Lateral 6" 3/10  6" 3/10           PYR HAM: S 9  P 5, C 7 P 4 3/10  P 4 2/10 P5 10X           PYR QUAD:   S 4, P 4, C 2 P 1 3/10  P1 2/10 P2 10X           Leg Press:   S 11 P 4 3/10  P4 2/10 P5 10X           XO TKE P 2 3/10  P2 3/10           Hack Squat               SLS               NuStep: S 11, A 0 L 7 10'  L7 10'           Bike: S               Treadmill                               Modalities                CP 13'  15'

## 2018-12-13 ENCOUNTER — OFFICE VISIT (OUTPATIENT)
Dept: PHYSICAL THERAPY | Facility: CLINIC | Age: 68
End: 2018-12-13
Payer: MEDICARE

## 2018-12-13 DIAGNOSIS — Z96.652 AFTERCARE FOLLOWING LEFT KNEE JOINT REPLACEMENT SURGERY: Primary | ICD-10-CM

## 2018-12-13 DIAGNOSIS — Z47.1 AFTERCARE FOLLOWING LEFT KNEE JOINT REPLACEMENT SURGERY: Primary | ICD-10-CM

## 2018-12-13 PROCEDURE — 97010 HOT OR COLD PACKS THERAPY: CPT

## 2018-12-13 PROCEDURE — 97110 THERAPEUTIC EXERCISES: CPT

## 2018-12-13 PROCEDURE — 97140 MANUAL THERAPY 1/> REGIONS: CPT

## 2018-12-13 NOTE — PROGRESS NOTES
Daily Note     Today's date: 2018  Patient name: Juancarlos Ervin  : 1950  MRN: [id number]  Referring provider: Delvis Henriquez MD  Dx:   Encounter Diagnosis     ICD-10-CM    1  Aftercare following left knee joint replacement surgery Z47 1     [id number] [id number]                   Subjective: Patient reports he had "good soreness" post last session  He takes tylenol before bed  Objective: See treatment diary below      Assessment: Tolerated treatment well  Patient exhibited good technique with therapeutic exercises      Plan: Continue per plan of care       Precautions: None     Daily Treatment Diary      Manual  12/10  12/11  12/13         PROM RK  LF  LF                         Exercise Diary                QS               SLR               SAQ               Heel slides with Strap               T-Band Press               T-Band Hamstring               T-Band TKE               LAQ               Mini Squats    2/10  Ball 2/10         Steps Forward               Steps Lateral 6" 3/10  6" 3/10  6" 3/10         PYR HAM: S 9  P 5, C 7 P 4 3/10  P 4 2/10 P5 10X  P5 10X  P6 2/10         PYR QUAD:   S 4, P 4, C 2 P 1 3/10  P1 2/10 P2 10X  P1 3/10  P2 10X         Leg Press:   S 11 P 4 3/10  P4 2/10 P5 10X  P4 10X  P5 2/10         XO TKE P 2 3/10  P2 3/10  P2 3/10         Hack Squat               SLS               NuStep: S 11, A 0 L 7 10'  L7 10'  L7 10'         Bike: S               Treadmill                               Modalities                CP 13'  15'  15'

## 2018-12-17 ENCOUNTER — OFFICE VISIT (OUTPATIENT)
Dept: PHYSICAL THERAPY | Facility: CLINIC | Age: 68
End: 2018-12-17
Payer: MEDICARE

## 2018-12-17 DIAGNOSIS — Z96.652 AFTERCARE FOLLOWING LEFT KNEE JOINT REPLACEMENT SURGERY: Primary | ICD-10-CM

## 2018-12-17 DIAGNOSIS — Z47.1 AFTERCARE FOLLOWING LEFT KNEE JOINT REPLACEMENT SURGERY: Primary | ICD-10-CM

## 2018-12-17 PROCEDURE — 97140 MANUAL THERAPY 1/> REGIONS: CPT

## 2018-12-17 PROCEDURE — 97110 THERAPEUTIC EXERCISES: CPT

## 2018-12-17 PROCEDURE — 97010 HOT OR COLD PACKS THERAPY: CPT

## 2018-12-17 NOTE — PROGRESS NOTES
Daily Note     Today's date: 2018  Patient name: Ansley Pang  : 1950  MRN: [id number]  Referring provider: Gerda Yoder MD  Dx:   Encounter Diagnosis     ICD-10-CM    1  Aftercare following left knee joint replacement surgery Z47 1     Z96 652                   Subjective: Patient reports his knee is mildly sore  Objective: See treatment diary below      Assessment: Tolerated treatment well  Patient exhibited good technique with therapeutic exercises      Plan: Continue per plan of care       Precautions: None     Daily Treatment Diary      Manual  12/10  12/11  12/13  12/17       PROM RK  LF  LF  LF                       Exercise Diary                QS        20X       SLR               SAQ               Heel slides with Strap               T-Band Press               T-Band Hamstring               T-Band TKE               LAQ               Mini Squats    2/10  Ball 2/10  Ball 2/10       Steps Forward               Steps Lateral 6" 3/10  6" 3/10  6" 3/10  6" 3/10       PYR HAM: S 9  P 5, C 7 P 4 3/10  P 4 2/10 P5 10X  P5 10X  P6 2/10  P5 10X  P6 2/10       PYR QUAD:   S 4, P 4, C 2 P 1 3/10  P1 2/10 P2 10X  P1 3/10  P2 10X  P2 2/10  P3 10X       Leg Press:   S 11 P 4 3/10  P4 2/10 P5 10X  P4 10X  P5 2/10  P4 2/10  P5 10X       XO TKE P 2 3/10  P2 3/10  P2 3/10  P2 2/10  P3 2/10       Hack Squat               SLS               NuStep: S 11, A 0 L 7 10'  L7 10'  L7 10'  L6 10'       Bike: S               Treadmill                               Modalities                CP 13'  15'  15'

## 2018-12-19 ENCOUNTER — OFFICE VISIT (OUTPATIENT)
Dept: PHYSICAL THERAPY | Facility: CLINIC | Age: 68
End: 2018-12-19
Payer: MEDICARE

## 2018-12-19 DIAGNOSIS — Z47.1 AFTERCARE FOLLOWING LEFT KNEE JOINT REPLACEMENT SURGERY: Primary | ICD-10-CM

## 2018-12-19 DIAGNOSIS — Z96.652 AFTERCARE FOLLOWING LEFT KNEE JOINT REPLACEMENT SURGERY: Primary | ICD-10-CM

## 2018-12-19 PROCEDURE — 97010 HOT OR COLD PACKS THERAPY: CPT

## 2018-12-19 PROCEDURE — 97140 MANUAL THERAPY 1/> REGIONS: CPT

## 2018-12-19 PROCEDURE — 97110 THERAPEUTIC EXERCISES: CPT

## 2018-12-19 NOTE — PROGRESS NOTES
PT Discharge    Today's date: 2018  Patient name: Hernando Bullock  : 1950  MRN: [id number]  Referring provider: Butch Babin MD  Dx:   Encounter Diagnosis     ICD-10-CM    1  Aftercare following left knee joint replacement surgery Z47 1     Z96 652                   Assessment  Assessment details:   CURRENT FUNCTIONAL STATUS    Standing/ADL tolerance 2 hours  Walking tolerance 1 mile  Ascends/descends stairs reciprocally consistantly  Difficulty arising from sitting: None  Able to squat 60 degrees  Ability to dress lower extremities: Good     LONG TERM GOALS (DISCHARGE)    Knee AROM: 0-120 deg -met  Knee PROM: 0-125 deg -met  Knee Strength: E=70 lbs, F=49 lbs -partially met  Girth MP: 43 cm -partially met  Decrease pain to 0-1/10 -met  Standing/ADL tolerance 2 hours  -met   Walking tolerance 1 mile  -met   Ascends/descends stairs reciprocally consistantly -met   Difficulty arising from sitting: None-met  Able to squat 60 degrees  -met  Understanding of Dx/Px/POC: excellent  Goals  See assessment details above  Plan  Plan details: The patient has shown good improvement in PT demonstrating decreased pain, increased range of motion, increased strength, and increased tolerance to activity  Goals have been met, the patient is satisfied with his current status, and he has been discharged from our care  Patient would benefit from: skilled physical therapy  Planned modality interventions: unattended electrical stimulation and cryotherapy  Planned therapy interventions: manual therapy, gait training, therapeutic exercise and therapeutic activities        Subjective Evaluation    History of Present Illness  Mechanism of injury: Subjective: The patient's left knee pain is present infrequently in the outside of the knee  He is pleased with his activity tolerance and requests discharge at this time    Pain  Current pain ratin  At best pain ratin  At worst pain ratin    Patient Goals  Patient goals for therapy: decreased pain, decreased edema, increased strength, increased motion and return to sport/leisure activities          Objective     Observations     Additional Observation Details  Patient ambulates without a limp  General Comments     Knee Comments  CURRENT OBJECTIVE MEASUREMENTS    Knee AROM: -5-120 deg  Knee PROM: -0-125 deg  Knee Strength: E=59 lbs, F=63 lbs  Girth MP: 44 cm           Precautions: None     Daily Treatment Diary      Manual  12/10  12/11  12/13  12/17  12/19  12/21   PROM RK  LF  LF  LF  LF  RK                   Exercise Diary                QS        20X  20X  20x   SLR               SAQ               Heel slides with Strap               T-Band Press               T-Band Hamstring               T-Band TKE               LAQ               Mini Squats    2/10  Ball 2/10  Ball 2/10  Ball 2/10  Ball 2/10   Steps Forward               Steps Lateral 6" 3/10  6" 3/10  6" 3/10  6" 3/10  6" 3/10  6" 3/10   PYR HAM: S 9  P 5, C 7 P 4 3/10  P 4 2/10 P5 10X  P5 10X  P6 2/10  P5 10X  P6 2/10  P6 3/10  P 6 3/10   PYR QUAD:   S 4, P 4, C 2 P 1 3/10  P1 2/10 P2 10X  P1 3/10  P2 10X  P2 2/10  P3 10X  P3 3/10  P4 10X  P 3 3/10   P 4 10x   Leg Press:   S 11 P 4 3/10  P4 2/10 P5 10X  P4 10X  P5 2/10  P4 2/10  P5 10X  P5 10X  P6 2/10  P 5 10x P 6 2/10   XO TKE P 2 3/10  P2 3/10  P2 3/10  P2 2/10  P3 2/10  P 3 10X  P4 2/10  P 3 10x  P 4 2/10   Hack Squat               SLS               NuStep: S 11, A 0 L 7 10'  L7 10'  L7 10'  L6 10'  L6 10'  L 6 10'   Bike: S               Treadmill                               Modalities                CP 13'  15'  15'    15'  15'

## 2018-12-19 NOTE — PROGRESS NOTES
Daily Note     Today's date: 2018  Patient name: Suzen Phalen  : 1950  MRN: [id number]  Referring provider: Rob Haley MD  Dx:   Encounter Diagnosis     ICD-10-CM    1  Aftercare following left knee joint replacement surgery Z47 1     Z96 [id number]                   Subjective: Patient reports he has pain on the lateral aspect of the knee with side to side movement or when turning to the right side while in bed  Objective: See treatment diary below  Weight progressed for several exercises  Assessment: Tolerated treatment well  Patient exhibited good technique with therapeutic exercises      Plan: Continue per plan of care         Precautions: None     Daily Treatment Diary      Manual  12/10  12/11  12/13  12/17  12/19     PROM RK  LF  LF  LF  LF                     Exercise Diary                QS        20X  20X     SLR               SAQ               Heel slides with Strap               T-Band Press               T-Band Hamstring               T-Band TKE               LAQ               Mini Squats    2/10  Ball 2/10  Ball 2/10  Ball 2/10     Steps Forward               Steps Lateral 6" 3/10  6" 3/10  6" 3/10  6" 3/10  6" 3/10     PYR HAM: S 9  P 5, C 7 P 4 3/10  P 4 2/10 P5 10X  P5 10X  P6 2/10  P5 10X  P6 2/10  P6 3/10     PYR QUAD:   S 4, P 4, C 2 P 1 3/10  P1 2/10 P2 10X  P1 3/10  P2 10X  P2 2/10  P3 10X  P3 3/10  P4 10X     Leg Press:   S 11 P 4 3/10  P4 2/10 P5 10X  P4 10X  P5 2/10  P4 2/10  P5 10X  P5 10X  P6 2/10     XO TKE P 2 3/10  P2 3/10  P2 3/10  P2 2/10  P3 2/10  P 3 10X  P4 2/10     Hack Squat               SLS               NuStep: S 11, A 0 L 7 10'  L7 10'  L7 10'  L6 10'  L6 10'     Bike: S               Treadmill                               Modalities                CP 13'  15'  15'    15'

## 2018-12-21 ENCOUNTER — EVALUATION (OUTPATIENT)
Dept: PHYSICAL THERAPY | Facility: CLINIC | Age: 68
End: 2018-12-21
Payer: MEDICARE

## 2018-12-21 DIAGNOSIS — Z47.1 AFTERCARE FOLLOWING LEFT KNEE JOINT REPLACEMENT SURGERY: Primary | ICD-10-CM

## 2018-12-21 DIAGNOSIS — Z96.652 AFTERCARE FOLLOWING LEFT KNEE JOINT REPLACEMENT SURGERY: Primary | ICD-10-CM

## 2018-12-21 PROCEDURE — G8980 MOBILITY D/C STATUS: HCPCS | Performed by: PHYSICAL THERAPIST

## 2018-12-21 PROCEDURE — 97110 THERAPEUTIC EXERCISES: CPT | Performed by: PHYSICAL THERAPIST

## 2018-12-21 PROCEDURE — 97140 MANUAL THERAPY 1/> REGIONS: CPT | Performed by: PHYSICAL THERAPIST

## 2018-12-21 PROCEDURE — 97010 HOT OR COLD PACKS THERAPY: CPT | Performed by: PHYSICAL THERAPIST

## 2018-12-21 PROCEDURE — G8979 MOBILITY GOAL STATUS: HCPCS | Performed by: PHYSICAL THERAPIST

## 2018-12-24 ENCOUNTER — APPOINTMENT (OUTPATIENT)
Dept: PHYSICAL THERAPY | Facility: CLINIC | Age: 68
End: 2018-12-24
Payer: MEDICARE

## 2019-05-20 ENCOUNTER — TRANSCRIBE ORDERS (OUTPATIENT)
Dept: ADMINISTRATIVE | Facility: HOSPITAL | Age: 69
End: 2019-05-20

## 2019-05-20 DIAGNOSIS — M94.261 CHONDROMALACIA OF RIGHT KNEE: Primary | ICD-10-CM

## 2019-05-24 ENCOUNTER — HOSPITAL ENCOUNTER (OUTPATIENT)
Dept: MRI IMAGING | Facility: HOSPITAL | Age: 69
Discharge: HOME | End: 2019-05-24
Attending: ORTHOPAEDIC SURGERY
Payer: MEDICARE

## 2019-05-24 DIAGNOSIS — M94.261 CHONDROMALACIA OF RIGHT KNEE: ICD-10-CM

## 2019-05-24 PROCEDURE — 73721 MRI JNT OF LWR EXTRE W/O DYE: CPT

## 2019-12-03 ENCOUNTER — TRANSCRIBE ORDERS (OUTPATIENT)
Dept: PHYSICAL THERAPY | Facility: CLINIC | Age: 69
End: 2019-12-03

## 2019-12-09 NOTE — PROGRESS NOTES
PT Evaluation     Today's date: 2019  Patient name: Mary Wright  : 1950  MRN: [id number]  Referring provider: Kathe Castro PA-C  Dx:   Encounter Diagnosis     ICD-10-CM    1  Aftercare following right knee joint replacement surgery Z47 1     Z96 651                   Assessment  Assessment details:   CURRENT FUNCTIONAL STATUS    Standing/ADL tolerance 60 minutes  Walking tolerance 1/2 mile  Ascends/descends stairs reciprocally with heavy use of handrail  SHORT TERM GOALS (2 WEEKS)    Increase knee AROM and PROM 10 degrees  Increase knee strength 3-5 lbs in all weak areas  Girth MP: 44 cm  Decrease pain to 0-2/10  Standing/ADL tolerance 90 minutes  Walking tolerance 3/4 mile  Ascends/descends stairs reciprocally with moderate use of handrail  LONG TERM GOALS (DISCHARGE)    Knee AROM: - deg  Knee PROM: 0-125 deg  Knee Strength: E=55 lbs, F=60 lbs  Girth MP: 43 cm  Decrease pain to 0-1/10  Standing/ADL tolerance 2 hours  Walking tolerance 1 mile  Ascends/descends stairs reciprocally with minimal use of handrail  Understanding of Dx/Px/POC: good   Prognosis: good    Goals  See assessment details above  Plan  Plan details: Mary Wright is a 78 y o  male presenting to PT with pain, decreased range of motion, decreased strength, and decreased tolerance to activity  This patient would benefit from skilled PT services to address these issues and to maximize function  Thank you for the referral     Patient would benefit from: skilled physical therapy  Planned modality interventions: unattended electrical stimulation and cryotherapy  Planned therapy interventions: manual therapy, therapeutic activities, therapeutic exercise and gait training  Frequency: 2x week  Duration in weeks: 4        Subjective Evaluation    History of Present Illness  Mechanism of injury: CC: Right knee pain, swelling, stiffness, and weakness    HPI: The patient's right knee problem has been present for several years  He had a TKR performed on 2019, and then he had 2 weeks of home PT  He is no longer using an AD, and he is driving  He is retired and lives with his wife in a ranch home  He enjoys traveling and golfing  Pain  Current pain ratin  At best pain ratin  At worst pain rating: 3    Patient Goals  Patient goals for therapy: decreased pain, decreased edema, increased strength and return to sport/leisure activities          Objective     Observations     Additional Observation Details  Patient ambulates without an AD  There is a moderate limp, and a mild loss of terminal knee extension in the right knee  General Comments:      Knee Comments  CURRENT OBJECTIVE MEASUREMENTS    Knee AROM: - deg  Knee PROM: - deg  Knee Strength: E=50 lbs, F=41 lbs  Girth MP: 45 2 cm                      Precautions: None    Daily Treatment Diary     Manual          PROM RK        Distal Quad STM RK                 Exercise Diary          QS         SLR         SAQ         Heel slides with strap         T-Band Press         T-Band Hamstring L3 2/15        T-Band TKE L3 2/15        LAQ 2# 2/15        Mini Squats         Steps Forward         Steps Lateral         PYR HAM:   S , P , C         PYR QUAD:   S , P , C         Leg Press: S         XO TKE         Hack Squat         SLS         NuStep:   S , A         Bike: S         Treadmill                  Modalities          CP 15'

## 2019-12-12 ENCOUNTER — EVALUATION (OUTPATIENT)
Dept: PHYSICAL THERAPY | Facility: CLINIC | Age: 69
End: 2019-12-12
Payer: MEDICARE

## 2019-12-12 ENCOUNTER — TRANSCRIBE ORDERS (OUTPATIENT)
Dept: PHYSICAL THERAPY | Facility: CLINIC | Age: 69
End: 2019-12-12

## 2019-12-12 DIAGNOSIS — Z47.1 AFTERCARE FOLLOWING RIGHT KNEE JOINT REPLACEMENT SURGERY: Primary | ICD-10-CM

## 2019-12-12 DIAGNOSIS — Z96.651 AFTERCARE FOLLOWING RIGHT KNEE JOINT REPLACEMENT SURGERY: Primary | ICD-10-CM

## 2019-12-12 PROCEDURE — 97161 PT EVAL LOW COMPLEX 20 MIN: CPT | Performed by: PHYSICAL THERAPIST

## 2019-12-12 PROCEDURE — 97140 MANUAL THERAPY 1/> REGIONS: CPT | Performed by: PHYSICAL THERAPIST

## 2019-12-12 PROCEDURE — 97110 THERAPEUTIC EXERCISES: CPT | Performed by: PHYSICAL THERAPIST

## 2019-12-12 PROCEDURE — 97010 HOT OR COLD PACKS THERAPY: CPT | Performed by: PHYSICAL THERAPIST

## 2019-12-12 NOTE — LETTER
2019    Jane Connell PA-C  [address]  [facility] Decisions    Patient: Fidel Chong   YOB: 1950   Date of Visit: 2019     Encounter Diagnosis     ICD-10-CM    1  Aftercare following right knee joint replacement surgery Z47 1     Z96 651        Dear Dr Inés Frey: Thank you for your recent referral of Fidel Chong  Please review the attached evaluation summary from Eugene's recent visit  Please verify that you agree with the plan of care by signing the attached order  If you have any questions or concerns, please do not hesitate to call  I sincerely appreciate the opportunity to share in the care of one of your patients and hope to have another opportunity to work with you in the near future  Sincerely,    Poonam Calderon, PT      Referring Provider:      I certify that I have read the below Plan of Care and certify the need for these services furnished under this plan of treatment while under my care  Jane Connell PA-C  [address]  [address] [place] [address] 109: [phone]          PT Evaluation     Today's date: 2019  Patient name: Shannon Lorenzo  : 1950  MRN: [id number]  Referring provider: Brody Ross PA-C  Dx:   Encounter Diagnosis     ICD-10-CM    1  Aftercare following right knee joint replacement surgery Z47 1     Z96 651                   Assessment  Assessment details:   CURRENT FUNCTIONAL STATUS    Standing/ADL tolerance 60 minutes  Walking tolerance 1/2 mile  Ascends/descends stairs reciprocally with heavy use of handrail  SHORT TERM GOALS (2 WEEKS)    Increase knee AROM and PROM 10 degrees  Increase knee strength 3-5 lbs in all weak areas  Girth MP: 44 cm  Decrease pain to 0-2/10  Standing/ADL tolerance 90 minutes  Walking tolerance 3/4 mile  Ascends/descends stairs reciprocally with moderate use of handrail       LONG TERM GOALS (DISCHARGE)    Knee AROM: - deg  Knee PROM: 0-125 deg  Knee Strength: E=55 lbs, F=60 lbs  Girth MP: 43 cm  Decrease pain to 0-1/10  Standing/ADL tolerance 2 hours  Walking tolerance 1 mile  Ascends/descends stairs reciprocally with minimal use of handrail  Understanding of Dx/Px/POC: good   Prognosis: good    Goals  See assessment details above  Plan  Plan details: Carmen Moseley is a 78 y o  male presenting to PT with pain, decreased range of motion, decreased strength, and decreased tolerance to activity  This patient would benefit from skilled PT services to address these issues and to maximize function  Thank you for the referral     Patient would benefit from: skilled physical therapy  Planned modality interventions: unattended electrical stimulation and cryotherapy  Planned therapy interventions: manual therapy, therapeutic activities, therapeutic exercise and gait training  Frequency: 2x week  Duration in weeks: 4        Subjective Evaluation    History of Present Illness  Mechanism of injury: CC: Right knee pain, swelling, stiffness, and weakness  HPI: The patient's right knee problem has been present for several years  He had a TKR performed on 2019, and then he had 2 weeks of home PT  He is no longer using an AD, and he is driving  He is retired and lives with his wife in a ranch home  He enjoys traveling and golfing  Pain  Current pain ratin  At best pain ratin  At worst pain rating: 3    Patient Goals  Patient goals for therapy: decreased pain, decreased edema, increased strength and return to sport/leisure activities          Objective     Observations     Additional Observation Details  Patient ambulates without an AD  There is a moderate limp, and a mild loss of terminal knee extension in the right knee  General Comments:      Knee Comments  CURRENT OBJECTIVE MEASUREMENTS    Knee AROM: - deg  Knee PROM: - deg     Knee Strength: E=50 lbs, F=41 lbs    Girth MP: 45 2 cm                      Precautions: None    Daily Treatment Diary     Manual  12/12        PROM RK        Distal Quad STM RK                 Exercise Diary          QS         SLR         SAQ         Heel slides with strap         T-Band Press         T-Band Hamstring L3 2/15        T-Band TKE L3 2/15        LAQ 2# 2/15        Mini Squats         Steps Forward         Steps Lateral         PYR HAM:   S , P , C         PYR QUAD:   S , P , C         Leg Press: S         XO TKE         Hack Squat         SLS         NuStep:   S , A         Bike: S         Treadmill                  Modalities          CP 15'

## 2019-12-13 ENCOUNTER — OFFICE VISIT (OUTPATIENT)
Dept: PHYSICAL THERAPY | Facility: CLINIC | Age: 69
End: 2019-12-13
Payer: MEDICARE

## 2019-12-13 DIAGNOSIS — Z96.651 AFTERCARE FOLLOWING RIGHT KNEE JOINT REPLACEMENT SURGERY: Primary | ICD-10-CM

## 2019-12-13 DIAGNOSIS — Z47.1 AFTERCARE FOLLOWING RIGHT KNEE JOINT REPLACEMENT SURGERY: Primary | ICD-10-CM

## 2019-12-13 PROCEDURE — 97110 THERAPEUTIC EXERCISES: CPT

## 2019-12-13 PROCEDURE — 97140 MANUAL THERAPY 1/> REGIONS: CPT

## 2019-12-13 PROCEDURE — 97010 HOT OR COLD PACKS THERAPY: CPT

## 2019-12-13 NOTE — PROGRESS NOTES
Daily Note     Today's date: 2019  Patient name: Dereck Duran  : 1950  MRN: [id number]  Referring provider: Joni Call PA-C  Dx:   Encounter Diagnosis     ICD-10-CM    1  Aftercare following right knee joint replacement surgery Z47 1     Z96 651                   Subjective: Patient reports he had soreness post initial session yesterday, mostly" in the muscles"       Objective: See treatment diary below  The exercise program was progressed  Assessment: Tolerated treatment well  Patient exhibited good technique with therapeutic exercises      Plan: Continue per plan of care        Precautions: None    Daily Treatment Diary     Manual         PROM RK RK       Distal Quad STM RK RK                Exercise Diary          QS  2/10       SLR         SAQ  2# 2/10       Heel slides with strap  2# 20X       T-Band Press  2# L4 20X       T-Band Hamstring L3 2/15 L3 2/15       T-Band TKE L3 2/15 L3 2/15       LAQ 2# 2/15 2# 2/15       Mini Squats         Steps Forward         Steps Lateral         PYR HAM:   S , P , C         PYR QUAD:   S , P , C         Leg Press: S         XO TKE         Hack Squat         SLS         NuStep:   S , A         Bike: S         Treadmill                  Modalities          CP 15'

## 2019-12-16 ENCOUNTER — OFFICE VISIT (OUTPATIENT)
Dept: PHYSICAL THERAPY | Facility: CLINIC | Age: 69
End: 2019-12-16
Payer: MEDICARE

## 2019-12-16 DIAGNOSIS — Z96.651 AFTERCARE FOLLOWING RIGHT KNEE JOINT REPLACEMENT SURGERY: Primary | ICD-10-CM

## 2019-12-16 DIAGNOSIS — Z47.1 AFTERCARE FOLLOWING RIGHT KNEE JOINT REPLACEMENT SURGERY: Primary | ICD-10-CM

## 2019-12-16 PROCEDURE — 97010 HOT OR COLD PACKS THERAPY: CPT

## 2019-12-16 PROCEDURE — 97110 THERAPEUTIC EXERCISES: CPT

## 2019-12-16 PROCEDURE — 97140 MANUAL THERAPY 1/> REGIONS: CPT

## 2019-12-16 NOTE — PROGRESS NOTES
Daily Note     Today's date: 2019  Patient name: Edelmira Otero  : 1950  MRN: [id number]  Referring provider: Eddie Viera PA-C  Dx:   Encounter Diagnosis     ICD-10-CM    1  Aftercare following right knee joint replacement surgery Z47 1     Z96 651                   Subjective: Patient reports he has minimal knee pain, and is ascending stairs reciprocally  Objective: See treatment diary below      Assessment: Tolerated treatment well  Patient exhibited good technique with therapeutic exercises  ROM is excellent with c/o mild stretch and ends of range  Plan: Continue per plan of care        Precautions: None    Daily Treatment Diary     Manual        PROM RK RK LF      Distal Quad STM RK RK                Exercise Diary          QS  2/10 2/10      SLR         SAQ  2# 2/10 2# 2/10      Heel slides with strap  2# 20X 2# 2/10      T-Band Press  2# L4 20X 2# L4 2/10      T-Band Hamstring L3 2/15 L3 2/15 L4 2/10      T-Band TKE L3 2/15 L3 2/15 L4 2/10      LAQ 2# 2/15 2# 2/15 2# 2/15      Mini Squats         Steps Forward         Steps Lateral   4" 2/10      PYR HAM:   S , P , C         PYR QUAD:   S , P , C         Leg Press: S         XO TKE         Hack Squat         SLS         NuStep:   S , A         Bike: S         Treadmill                  Modalities          CP 15'  15'

## 2019-12-17 ENCOUNTER — OFFICE VISIT (OUTPATIENT)
Dept: PHYSICAL THERAPY | Facility: CLINIC | Age: 69
End: 2019-12-17
Payer: MEDICARE

## 2019-12-17 DIAGNOSIS — Z96.651 AFTERCARE FOLLOWING RIGHT KNEE JOINT REPLACEMENT SURGERY: Primary | ICD-10-CM

## 2019-12-17 DIAGNOSIS — Z47.1 AFTERCARE FOLLOWING RIGHT KNEE JOINT REPLACEMENT SURGERY: Primary | ICD-10-CM

## 2019-12-17 PROCEDURE — 97110 THERAPEUTIC EXERCISES: CPT

## 2019-12-17 PROCEDURE — 97010 HOT OR COLD PACKS THERAPY: CPT

## 2019-12-17 PROCEDURE — 97140 MANUAL THERAPY 1/> REGIONS: CPT

## 2019-12-17 NOTE — PROGRESS NOTES
Daily Note     Today's date: 2019  Patient name: Dimitrios Maldonado  : 1950  MRN: [id number]  Referring provider: Kathe Castro PA-C  Dx:   Encounter Diagnosis     ICD-10-CM    1  Aftercare following right knee joint replacement surgery Z47 1     Z96 651                   Subjective: Patient reports he had increased soreness in the knee post last session, with gradual decrease  Objective: See treatment diary below      Assessment: Tolerated treatment well  Patient exhibited good technique with therapeutic exercises      Plan: Continue per plan of care        Precautions: None    Daily Treatment Diary     Manual       PROM RK RK LF LF     Distal Quad STM RK RK                Exercise Diary          QS  210 2/10 2/10     SLR         SAQ  2# 2/10 2# 2/10 2# 2/15     Heel slides with strap  2# 20X 2# 2/10 2# 20X     T-Band Press  2# L4 20X 2# L4 2/10 2# L4 2/10     T-Band Hamstring L3 2/15 L3 2/15 L4 2/10 L4 2/15     T-Band TKE L3 2/15 L3 2/15 L4 2/10 L4 20X     LAQ 2# 2/15 2# 2/15 2# 2/15 2# 2/15     Mini Squats         Steps Forward         Steps Lateral   4" 2/10 4" 2/10     PYR HAM:   S , P , C         PYR QUAD:   S , P , C         Leg Press: S         XO TKE         Hack Squat         SLS         NuStep:   S , A         Bike: S         Treadmill                  Modalities          CP 15'  15' 15'

## 2019-12-18 ENCOUNTER — APPOINTMENT (OUTPATIENT)
Dept: PHYSICAL THERAPY | Facility: CLINIC | Age: 69
End: 2019-12-18
Payer: MEDICARE

## 2019-12-20 ENCOUNTER — OFFICE VISIT (OUTPATIENT)
Dept: PHYSICAL THERAPY | Facility: CLINIC | Age: 69
End: 2019-12-20
Payer: MEDICARE

## 2019-12-20 DIAGNOSIS — Z47.1 AFTERCARE FOLLOWING RIGHT KNEE JOINT REPLACEMENT SURGERY: Primary | ICD-10-CM

## 2019-12-20 DIAGNOSIS — Z96.651 AFTERCARE FOLLOWING RIGHT KNEE JOINT REPLACEMENT SURGERY: Primary | ICD-10-CM

## 2019-12-20 PROCEDURE — 97110 THERAPEUTIC EXERCISES: CPT

## 2019-12-20 PROCEDURE — 97140 MANUAL THERAPY 1/> REGIONS: CPT

## 2019-12-20 PROCEDURE — 97010 HOT OR COLD PACKS THERAPY: CPT

## 2019-12-24 ENCOUNTER — OFFICE VISIT (OUTPATIENT)
Dept: PHYSICAL THERAPY | Facility: CLINIC | Age: 69
End: 2019-12-24
Payer: MEDICARE

## 2019-12-24 DIAGNOSIS — Z47.1 AFTERCARE FOLLOWING RIGHT KNEE JOINT REPLACEMENT SURGERY: Primary | ICD-10-CM

## 2019-12-24 DIAGNOSIS — Z96.651 AFTERCARE FOLLOWING RIGHT KNEE JOINT REPLACEMENT SURGERY: Primary | ICD-10-CM

## 2019-12-24 PROCEDURE — 97010 HOT OR COLD PACKS THERAPY: CPT

## 2019-12-24 PROCEDURE — 97140 MANUAL THERAPY 1/> REGIONS: CPT

## 2019-12-24 PROCEDURE — 97110 THERAPEUTIC EXERCISES: CPT

## 2019-12-24 NOTE — PROGRESS NOTES
Daily Note     Today's date: 2019  Patient name: Samir Echeverria  : 1950  MRN: [id number]  Referring provider: Jayy Tesfaye PA-C  Dx:   Encounter Diagnosis     ICD-10-CM    1  Aftercare following right knee joint replacement surgery Z47 1     Z96 651                   Subjective: Patient reports he went for a mile walk outside 2 days [place]  Since then he has had increased pain on the lateral aspect of knee and soreness in the lateral quads  Objective: See treatment diary below      Assessment: Tolerated treatment well  Patient exhibited good technique with therapeutic exercises      Plan: Continue per plan of care        Precautions: None    Daily Treatment Diary     Manual     PROM RK RK LF LF LF LF   Distal Quad STM RK RK                Exercise Diary          QS  210 2/10 2/10 20X 20X   SLR         SAQ  2# 2/10 2# 2/10 2# 2/15 2# 2/10 2# 20X   Heel slides with strap  2# 20X 2# 2/10 2# 20X 2# 20X 2# 2/10   T-Band Press  2# L4 20X 2# L4 2/10 2# L4 2/10 2# L4 20X 2# 20X   T-Band Hamstring L3 2/15 L3 2/15 L4 2/10 L4 2/15 L4 2/15 L4 2/15   T-Band TKE L3 2/15 L3 2/15 L4 2/10 L4 20X L4 20X L4 20X   LAQ 2# 2/15 2# 2/15 2# 2/15 2# 2/15 2# 2/15 2# 2/15   Mini Squats         Steps Forward         Steps Lateral   4" 2/10 4" 2/10 4" 20X 4" 20X   PYR HAM:   S , P , C         PYR QUAD:   S , P , C         Leg Press: S         XO TKE         [place] Squat         SLS         NuStep:   S , A         Bike: S         Treadmill                  Modalities          CP 15'  15' 15' 15' 15

## 2019-12-26 ENCOUNTER — OFFICE VISIT (OUTPATIENT)
Dept: PHYSICAL THERAPY | Facility: CLINIC | Age: 69
End: 2019-12-26
Payer: MEDICARE

## 2019-12-26 DIAGNOSIS — Z47.1 AFTERCARE FOLLOWING RIGHT KNEE JOINT REPLACEMENT SURGERY: Primary | ICD-10-CM

## 2019-12-26 DIAGNOSIS — Z96.651 AFTERCARE FOLLOWING RIGHT KNEE JOINT REPLACEMENT SURGERY: Primary | ICD-10-CM

## 2019-12-26 PROCEDURE — 97140 MANUAL THERAPY 1/> REGIONS: CPT

## 2019-12-26 PROCEDURE — 97110 THERAPEUTIC EXERCISES: CPT

## 2019-12-26 PROCEDURE — 97010 HOT OR COLD PACKS THERAPY: CPT

## 2019-12-26 NOTE — PROGRESS NOTES
Daily Note     Today's date: 2019  Patient name: Ashley Hylton  : 1950  MRN: [id number]  Referring provider: Iesha Godfrey PA-C  Dx:   Encounter Diagnosis     ICD-10-CM    1  Aftercare following right knee joint replacement surgery Z47 1     Z96 651                   Subjective: Patient reports his knee pain has decreased the last few days  Objective: See treatment diary below  Some exercises progressed to gym equipment  Assessment: Tolerated treatment well  Patient exhibited good technique with therapeutic exercises      Plan: Continue per plan of care        Precautions: None    Daily Treatment Diary     Manual     PROM RK RK LF LF LF LF   Distal Quad STM RK RK                Exercise Diary          QS 20X 2/10 2/10 2/10 20X 20X   SLR         SAQ 2# 2/10 2# 2/10 2# 2/10 2# 2/15 2# 2/10 2# 20X   Heel slides with strap 2# 2/10 2# 20X 2# 2/10 2# 20X 2# 20X 2# 2/10   T-Band Press  2# L4 20X 2# L4 2/10 2# L4 2/10 2# L4 20X 2# 20X   T-Band Hamstring  L3 2/15 L4 2/10 L4 2/15 L4 2/15 L4 2/15   T-Band TKE L4 2/15 L3 2/15 L4 2/10 L4 20X L4 20X L4 20X   LAQ  2# 2/15 2# 2/15 2# 2/15 2# 2/15 2# 2/15   Mini Squats 10X        Seated Ext Stretch 2# 2'        Steps Forward         Steps Lateral 4" 20X  4" 2/10 4" 2/10 4" 20X 4" 20X   PYR HAM:   S10 , P7 , C P5 2/10        PYR QUAD:   S5 , P4 , C P2 2/10        Leg Press: S11 P4 2/10        XO TKE         Hack Squat         SLS         NuStep:   S , A         Bike: S         Treadmill                  Modalities          CP 15'  15' 15' 15' 15

## 2019-12-31 ENCOUNTER — OFFICE VISIT (OUTPATIENT)
Dept: PHYSICAL THERAPY | Facility: CLINIC | Age: 69
End: 2019-12-31
Payer: MEDICARE

## 2019-12-31 DIAGNOSIS — Z47.1 AFTERCARE FOLLOWING RIGHT KNEE JOINT REPLACEMENT SURGERY: Primary | ICD-10-CM

## 2019-12-31 DIAGNOSIS — Z96.651 AFTERCARE FOLLOWING RIGHT KNEE JOINT REPLACEMENT SURGERY: Primary | ICD-10-CM

## 2019-12-31 PROCEDURE — 97010 HOT OR COLD PACKS THERAPY: CPT

## 2019-12-31 PROCEDURE — 97140 MANUAL THERAPY 1/> REGIONS: CPT

## 2019-12-31 PROCEDURE — 97110 THERAPEUTIC EXERCISES: CPT

## 2019-12-31 PROCEDURE — 97530 THERAPEUTIC ACTIVITIES: CPT

## 2019-12-31 NOTE — PROGRESS NOTES
Daily Note     Today's date: 2019  Patient name: Braeden Bauer  : 1950  MRN: [id number]  Referring provider: Kareen Elam PA-C  Dx:   Encounter Diagnosis     ICD-10-CM    1  Aftercare following right knee joint replacement surgery Z47 1     Z96 651                   Subjective: Patient reports his knee has been sore for the last few days  Objective: See treatment diary below      Assessment: Tolerated treatment well  Patient exhibited good technique with therapeutic exercises      Plan: Continue per plan of care        Precautions: None    Daily Treatment Diary     Manual     PROM RK LF LF LF LF LF   Distal Quad STM RK                 Exercise Diary          QS 20X 2/10 2/10 2/10 20X 20X   SLR         SAQ 2# 2/10 2# 3/10 2# 2/10 2# 2/15 2# 2/10 2# 20X   Heel slides with strap 2# 2/10 2# 20X 2# 2/10 2# 20X 2# 20X 2# 2/10   T-Band TKE L4 2/15 L3 2/15 L4 2/10 L4 20X L4 20X L4 20X   LAQ  2# 2/15 2# 2/15 2# 2/15 2# 2/15 2# 2/15   Mini Squats 10X 12X       Seated Ext Stretch 2# 2' 2# 2'       Steps Forward         Steps Lateral 4" 20X 6" 30X 4" 2/10 4" 2/10 4" 20X 4" 20X   PYR HAM:   S10 , P7 , C P5 2/10 P5 2/10       PYR QUAD:   S5 , P4 , C P2 2/10 P2 2/10       Leg Press: S11 P4 2/10 P4 2/10  P5 10X       XO TKE         Hack Squat         SLS         NuStep:   S11 , A0  L6 10'       Bike: S         Treadmill                  Modalities          CP 15' 15' 15' 15' 15' 15

## 2020-01-02 ENCOUNTER — OFFICE VISIT (OUTPATIENT)
Dept: PHYSICAL THERAPY | Facility: CLINIC | Age: 70
End: 2020-01-02
Payer: MEDICARE

## 2020-01-02 DIAGNOSIS — Z47.1 AFTERCARE FOLLOWING RIGHT KNEE JOINT REPLACEMENT SURGERY: Primary | ICD-10-CM

## 2020-01-02 DIAGNOSIS — Z96.651 AFTERCARE FOLLOWING RIGHT KNEE JOINT REPLACEMENT SURGERY: Primary | ICD-10-CM

## 2020-01-02 PROCEDURE — 97010 HOT OR COLD PACKS THERAPY: CPT

## 2020-01-02 PROCEDURE — 97110 THERAPEUTIC EXERCISES: CPT

## 2020-01-02 PROCEDURE — 97140 MANUAL THERAPY 1/> REGIONS: CPT

## 2020-01-02 PROCEDURE — 97530 THERAPEUTIC ACTIVITIES: CPT

## 2020-01-02 NOTE — PROGRESS NOTES
Daily Note     Today's date: 2020  Patient name: aYneth Cruz  : 1950  MRN: [id number]  Referring provider: Jose Olvera PA-C  Dx:   Encounter Diagnosis     ICD-10-CM    1  Aftercare following right knee joint replacement surgery Z47 1     Z96 651                   Subjective: Patient reports he has knee soreness with increased activity  Objective: See treatment diary below      Assessment: Tolerated treatment well  Patient exhibited good technique with therapeutic exercises      Plan: Continue per plan of care        Precautions: None    Daily Treatment Diary     Manual     PROM RK LF LF LF LF LF   Distal Quad STM RK                 Exercise Diary          QS 20X 2/10 2/10 2/10 20X 20X   SLR         SAQ 2# 2/10 2# 3/10 2# 3/10 2# 2/15 2# 2/10 2# 20X   Heel slides with strap 2# 2/10 2# 20X 2# 3/10 2# 20X 2# 20X 2# 2/10   Mini Squats 10X 12X 2/10      Seated Ext Stretch 2# 2' 2# 2' 2# 3'      Steps Forward         Steps Lateral 4" 20X 6" 30X 6" 3/10 4" 2/10 4" 20X 4" 20X   PYR HAM:   S10 , P7 , C P5 2/10 P5 2/10 P5 3/10      PYR QUAD:   S5 , P4 , C P2 2/10 P2 2/10 P2 3/10      Leg Press: S11 P4 2/10 P4 2/10  P5 10X P4 10X  P5 2/10      XO TKE   P3 3/10      Hack Squat         SLS         NuStep:   S11 , A0  L6 10' L6 10'      Bike: S         Treadmill                  Modalities          CP 15' 15' 15' 15' 15' 15

## 2020-01-06 NOTE — PROGRESS NOTES
PT Discharge    Today's date: 2020  Patient name: Fidel Chong  : 1950  MRN: [id number]  Referring provider: Brody Ross PA-C  Dx:   Encounter Diagnosis     ICD-10-CM    1  Aftercare following right knee joint replacement surgery Z47 1     Z96 651                   Assessment  Assessment details:   CURRENT FUNCTIONAL STATUS    Standing/ADL tolerance 1-2 hours  Walking tolerance 1 mile  Ascends/descends stairs reciprocally with minimal use of handrail  LONG TERM GOALS (DISCHARGE)    Knee AROM: - deg -met  Knee PROM: 0-125 deg -met  Knee Strength: E=55 lbs, F=60 lbs  -partially met  Girth MP: 43 cm -partially met  Decrease pain to 0-1/10 -partially met  Standing/ADL tolerance 2 hours  -partially met   Walking tolerance 1 mile  -met   Ascends/descends stairs reciprocally with minimal use of handrail  -met  Understanding of Dx/Px/POC: good   Prognosis: good    Goals  See assessment details above  Plan  Plan details: The patient has shown good improvement in PT demonstrating decreased pain, increased range of motion, increased strength, and increased tolerance to activity  Goals have been met, the patient is satisfied with his current status, and has been discharged to a home exercise program         Planned modality interventions: unattended electrical stimulation and cryotherapy  Planned therapy interventions: manual therapy, therapeutic activities, therapeutic exercise and gait training        Subjective Evaluation    History of Present Illness  Mechanism of injury: Subjective: The patient's right knee pain is mild and intermittent  He is doing well with all daily activities  As per his request he has been discharged to a an exercise program at our local [facility]    Pain  Current pain ratin  At best pain ratin  At worst pain ratin    Patient Goals  Patient goals for therapy: decreased pain, decreased edema, increased strength and return to sport/leisure activities          Objective     Observations     Additional Observation Details  Patient ambulates without an AD  There is a slight limp, and a mild loss of terminal knee extension in the right knee  General Comments:      Knee Comments  CURRENT OBJECTIVE MEASUREMENTS    Knee AROM: - deg  Knee PROM: 0-130 deg  Knee Strength: E=53 lbs, F=49 lbs  Girth MP: 44 cm                      Precautions: None     Daily Treatment Diary      Manual  12/26 12/31 1/2 1/7 12/20 12/24   PROM RK LF LF RK LF LF   Distal Quad STM RK                             Exercise Diary                QS 20X 2/10 2/10 2/10 20X 20X   SLR               SAQ 2# 2/10 2# 3/10 2# 3/10 2# 3/10 2# 2/10 2# 20X   Heel slides with strap 2# 2/10 2# 20X 2# 3/10 2# 3/10 2# 20X 2# 2/10   Mini Squats 10X 12X 2/10  2/10       Seated Ext Stretch 2# 2' 2# 2' 2# 3'  2# 3'       Steps Forward               Steps Lateral 4" 20X 6" 30X 6" 3/10 6" 3/10 4" 20X 4" 20X   PYR HAM:   S10 , P7 , C P5 2/10 P5 2/10 P5 3/10  P5 3/10       PYR QUAD:   S5 , P4 , C P2 2/10 P2 2/10 P2 3/10  P2 3/10       Leg Press: S11 P4 2/10 P4 2/10  P5 10X P4 10X  P5 2/10  P4 10x  P5 2/10       XO TKE     P3 3/10  P3 3/10       Hack Squat               SLS               NuStep:   S11 , A0   L6 10' L6 10'  L6 10'       Bike: S               Treadmill                               Modalities                CP 13' 13' 15' 15' 15' 15

## 2020-01-07 ENCOUNTER — EVALUATION (OUTPATIENT)
Dept: PHYSICAL THERAPY | Facility: CLINIC | Age: 70
End: 2020-01-07
Payer: MEDICARE

## 2020-01-07 DIAGNOSIS — Z96.651 AFTERCARE FOLLOWING RIGHT KNEE JOINT REPLACEMENT SURGERY: Primary | ICD-10-CM

## 2020-01-07 DIAGNOSIS — Z47.1 AFTERCARE FOLLOWING RIGHT KNEE JOINT REPLACEMENT SURGERY: Primary | ICD-10-CM

## 2020-01-07 PROCEDURE — 97110 THERAPEUTIC EXERCISES: CPT | Performed by: PHYSICAL THERAPIST

## 2020-01-07 PROCEDURE — 97530 THERAPEUTIC ACTIVITIES: CPT | Performed by: PHYSICAL THERAPIST

## 2020-01-07 PROCEDURE — 97010 HOT OR COLD PACKS THERAPY: CPT | Performed by: PHYSICAL THERAPIST

## 2020-01-07 PROCEDURE — 97140 MANUAL THERAPY 1/> REGIONS: CPT | Performed by: PHYSICAL THERAPIST

## 2020-01-09 ENCOUNTER — APPOINTMENT (OUTPATIENT)
Dept: PHYSICAL THERAPY | Facility: CLINIC | Age: 70
End: 2020-01-09
Payer: MEDICARE

## 2020-08-27 ENCOUNTER — HOSPITAL ENCOUNTER (EMERGENCY)
Facility: HOSPITAL | Age: 70
Discharge: HOME | End: 2020-08-27
Attending: EMERGENCY MEDICINE | Admitting: EMERGENCY MEDICINE
Payer: MEDICARE

## 2020-08-27 ENCOUNTER — APPOINTMENT (EMERGENCY)
Dept: RADIOLOGY | Facility: HOSPITAL | Age: 70
End: 2020-08-27
Payer: MEDICARE

## 2020-08-27 VITALS
HEART RATE: 61 BPM | SYSTOLIC BLOOD PRESSURE: 162 MMHG | RESPIRATION RATE: 18 BRPM | DIASTOLIC BLOOD PRESSURE: 95 MMHG | WEIGHT: 210.32 LBS | HEIGHT: 73 IN | BODY MASS INDEX: 27.87 KG/M2 | TEMPERATURE: 97.2 F | OXYGEN SATURATION: 97 %

## 2020-08-27 DIAGNOSIS — S61.219A FINGER LACERATION, INITIAL ENCOUNTER: Primary | ICD-10-CM

## 2020-08-27 PROCEDURE — 99283 EMERGENCY DEPT VISIT LOW MDM: CPT

## 2020-08-27 PROCEDURE — 12001 RPR S/N/AX/GEN/TRNK 2.5CM/<: CPT | Performed by: PHYSICIAN ASSISTANT

## 2020-08-27 PROCEDURE — 99284 EMERGENCY DEPT VISIT MOD MDM: CPT | Performed by: EMERGENCY MEDICINE

## 2020-08-27 PROCEDURE — 73140 X-RAY EXAM OF FINGER(S): CPT

## 2020-08-27 RX ORDER — LIDOCAINE HYDROCHLORIDE 20 MG/ML
5 INJECTION, SOLUTION EPIDURAL; INFILTRATION; INTRACAUDAL; PERINEURAL ONCE
Status: COMPLETED | OUTPATIENT
Start: 2020-08-27 | End: 2020-08-27

## 2020-08-27 RX ORDER — FAMOTIDINE 20 MG/1
20 TABLET, FILM COATED ORAL 2 TIMES DAILY PRN
COMMUNITY

## 2020-08-27 RX ORDER — GINSENG 100 MG
1 CAPSULE ORAL ONCE
Status: COMPLETED | OUTPATIENT
Start: 2020-08-27 | End: 2020-08-27

## 2020-08-27 RX ADMIN — LIDOCAINE HYDROCHLORIDE 5 ML: 20 INJECTION, SOLUTION EPIDURAL; INFILTRATION; INTRACAUDAL; PERINEURAL at 13:31

## 2020-08-27 RX ADMIN — BACITRACIN 1 SMALL APPLICATION: 500 OINTMENT TOPICAL at 14:17

## 2020-08-27 NOTE — ED NOTES
Dr Xena Peterson at bedside to repair laceration at this time       Shelby Paredes, RN  08/27/20 8698

## 2020-08-27 NOTE — ED PROCEDURE NOTE
Procedure  Laceration repair    Date/Time: 8/27/2020 1:05 PM  Performed by: Sam Monroe PA-C  Authorized by: Sam Monroe PA-C   Consent: Verbal consent obtained  Risks and benefits: risks, benefits and alternatives were discussed  Consent given by: patient  Patient understanding: patient states understanding of the procedure being performed  Site marked: the operative site was marked  Required items: required blood products, implants, devices, and special equipment available  Patient identity confirmed: verbally with patient and arm band  Time out: Immediately prior to procedure a "time out" was called to verify the correct patient, procedure, equipment, support staff and site/side marked as required  Body area: upper extremity  Location details: left small finger  Laceration length: 1 4 (C shaped flap; jagged in nature, of the distal 5th finger tip   ) cm  Foreign bodies: no foreign bodies  Tendon involvement: none  Nerve involvement: none  Anesthesia: digital block (performed by Dr Marquez Darden)      Procedure Details:  Preparation: Patient was prepped and draped in the usual sterile fashion  Irrigation solution: saline  Irrigation method: syringe  Amount of cleaning: standard  Debridement: none  Degree of undermining: none  Skin closure: 5-0 nylon  Number of sutures: 5  Technique: simple  Approximation: close  Approximation difficulty: simple  Dressing: antibiotic ointment and gauze roll  Patient tolerance: Patient tolerated the procedure well with no immediate complications      Verbal consent obtained for repair of the laceration  Pt prepped and draped in usual sterile fashion  See procedure note above  Pt tolerated well  Wound approximated and hemostasis achieved  Bacitracin and sterile dressing applied  Reviewed standard wound care  S/sx infection reviewed  Suture removal in 10 days  Strict return precautions outlined                       Sam Monroe PA-C  08/27/20 1453

## 2020-08-27 NOTE — DISCHARGE INSTRUCTIONS
Keep wound clean and dry  Topical antibiotic ointment such as bacitracin twice daily  Five sutures out in 10 days

## 2020-08-27 NOTE — ED PROVIDER NOTES
History  Chief Complaint   Patient presents with    Finger Laceration     cut left 5th finger while woodworking     56-year-old right-hand-dominant male sustained a laceration to his left 5th digit at his house while utilizing a basket  his wood shop  Bleeding has been controlled with direct pressure he denies numbness or tingling no weakness last tetanus was in July of 2020 he denies any other injuries  Prior to Admission Medications   Prescriptions Last Dose Informant Patient Reported? Taking? Levothyroxine Sodium 25 MCG CAPS 8/27/2020 at Unknown time  Yes Yes   Sig: Take 1 tablet by mouth daily   acetaminophen (TYLENOL) 325 mg tablet   No Yes   Sig: Every 4-6 hours as needed   allopurinol (ZYLOPRIM) 100 mg tablet Not Taking at Unknown time  Yes No   Sig: Take 1 tablet by mouth daily as directed for gout prevention     colchicine (COLCRYS) 0 6 mg tablet   No Yes   Sig: Take 1 tablet by mouth daily for 30 days   Patient taking differently: Take 0 6 mg by mouth daily as needed     docusate sodium (COLACE) 100 mg capsule Not Taking at Unknown time  No No   Sig: Take 1 capsule (100 mg total) by mouth 2 (two) times a day   Patient not taking: Reported on 8/27/2020   famotidine (PEPCID) 20 mg tablet   Yes No   Sig: Take 20 mg by mouth 2 (two) times a day as needed   ferrous sulfate 325 (65 Fe) mg tablet Not Taking at Unknown time  No No   Sig: Take 1 tablet (325 mg total) by mouth daily with breakfast   Patient not taking: Reported on 8/27/2020   gabapentin (NEURONTIN) 100 mg capsule Not Taking at Unknown time  Yes No   Sig: Take 600 mg by mouth daily     lisinopril (ZESTRIL) 10 mg tablet 8/27/2020 at Unknown time  Yes Yes   Sig: Take 10 mg by mouth daily   ranitidine (ZANTAC) 150 MG capsule Not Taking at Unknown time  Yes No   Sig: Take 150 mg by mouth daily as needed     rivaroxaban (XARELTO) 10 mg tablet   No No   Sig: Take 1 tablet (10 mg total) by mouth daily with dinner for 20 days Facility-Administered Medications: None       Past Medical History:   Diagnosis Date    Arthritis     Basal cell carcinoma in situ of skin     on back    Disease of thyroid gland     hypothyroid    GERD (gastroesophageal reflux disease)     Gout     Gout     Hypertension     Neuropathy     right foot    Spinal stenosis, lumbar     Wears dentures     upper full    Wears hearing aid     both ears       Past Surgical History:   Procedure Laterality Date    BACK SURGERY      spinal fusion    CATARACT EXTRACTION Bilateral     COLONOSCOPY      EYE SURGERY Bilateral     retinal dtetachment    JOINT REPLACEMENT      bilateral knee replacement    SC TOTAL KNEE ARTHROPLASTY Left 11/6/2018    Procedure: ARTHROPLASTY KNEE TOTAL;  Surgeon: Svetlana Longoria MD;  Location: [facility] OR;  Service: Orthopedics    THYROIDECTOMY, PARTIAL      WRIST SURGERY         History reviewed  No pertinent family history  I have reviewed and agree with the history as documented  E-Cigarette/Vaping    E-Cigarette Use Never User      E-Cigarette/Vaping Substances     Social History     Tobacco Use    Smoking status: Never Smoker    Smokeless tobacco: Never Used   Substance Use Topics    Alcohol use: Yes     Frequency: Monthly or less     Comment: occasional    Drug use: No       Review of Systems   Skin: Positive for wound  Neurological: Negative for weakness and numbness  All other systems reviewed and are negative  Physical Exam  Physical Exam  Vitals signs reviewed  Constitutional:       Appearance: Normal appearance  Musculoskeletal:      Left hand: He exhibits tenderness and laceration  He exhibits normal range of motion, no bony tenderness, normal two-point discrimination, normal capillary refill, no deformity and no swelling  Normal sensation noted  Decreased sensation is not present in the ulnar distribution, is not present in the medial redistribution and is not present in the radial distribution  Normal strength noted  He exhibits no finger abduction, no thumb/finger opposition and no wrist extension trouble  Hands:       Comments: 2+ radial pulse left wrist patient is able to oppose his thumb to all digits  His extend against resistance the FDP and FDS or isolated found be intact to the ring and 5th digit on the left hand lumbricals are intact there is no evidence of a subungual hematoma  Neurological:      Mental Status: He is alert  Vital Signs  ED Triage Vitals   Temperature Pulse Respirations Blood Pressure SpO2   08/27/20 1238 08/27/20 1238 08/27/20 1238 08/27/20 1238 08/27/20 1238   (!) 97 2 °F (36 2 °C) 71 18 (!) 184/95 96 %      Temp Source Heart Rate Source Patient Position - Orthostatic VS BP Location FiO2 (%)   08/27/20 1238 08/27/20 1238 08/27/20 1245 08/27/20 1245 --   Temporal Monitor Sitting Right arm       Pain Score       08/27/20 1238       2           Vitals:    08/27/20 1238 08/27/20 1245 08/27/20 1300 08/27/20 1330   BP: (!) 184/95 (!) 184/95 (!) 186/99 167/90   Pulse: 71 74 69 62   Patient Position - Orthostatic VS:  Sitting Sitting Lying         Visual Acuity      ED Medications  Medications   lidocaine (PF) (XYLOCAINE-MPF) 2 % injection 5 mL (5 mL Infiltration Given 8/27/20 1331)   bacitracin topical ointment 1 small application (1 small application Topical Given 8/27/20 1417)       Diagnostic Studies  Results Reviewed     None                 XR finger fifth digit-pinkie LEFT   ED Interpretation by Cleave Nageotte, MD (08/27 1405)   Read by me; Radiologist to provide formal interpretation  DJD no acute fracture      Final Result by Marnie Saint, MD (08/27 1411)      No acute osseous abnormality        Workstation performed: [id number]                    Procedures  Digital Block  Performed by: Cleave Nageotte, MD  Authorized by: Cleave Nageotte, MD     Procedure date/time:  8/27/2020 2:04 PM  Consent:     Consent obtained:  Verbal    Consent given by: Patient    Risks discussed:  Swelling, pain and unsuccessful block  Indications:     Indications:  Pain relief and procedural anesthesia  Location:     Block location:  Finger    Finger blocked:  L little finger  Pre-procedure details:     Neurovascular status: intact      Skin preparation:  2% chlorhexidine  Procedure details (see MAR for exact dosages):     Needle gauge:  25 G    Anesthetic injected:  Lidocaine 2% w/o epi    Technique:  Four-sided ring block    Injection procedure:  Anatomic landmarks identified, incremental injection and negative aspiration for blood  Post-procedure details:     Outcome:  Pain relieved    Patient tolerance of procedure: Tolerated well, no immediate complications             ED Course  ED Course as of Aug 27 1424   Thu Aug 27, 2020   1402 Left fifth digit plain film: Read by me; Radiologist to provide formal interpretation  No acute fracture djd          US AUDIT      Most Recent Value   Initial Alcohol Screen: US AUDIT-C    1  How often do you have a drink containing alcohol?  0 Filed at: 08/27/2020 1331   2  How many drinks containing alcohol do you have on a typical day you are drinking? 0 Filed at: 08/27/2020 1331   3a  Male UNDER 65: How often do you have five or more drinks on one occasion? 0 Filed at: 08/27/2020 1331   3b  FEMALE Any Age, or MALE 65+: How often do you have 4 or more drinks on one occassion? 0 Filed at: 08/27/2020 1331   Audit-C Score  0 Filed at: 08/27/2020 1331                  SMITA/DAST-10      Most Recent Value   How many times in the past year have you    Used an illegal drug or used a prescription medication for non-medical reasons?   Never Filed at: 08/27/2020 1331                                MDM  Number of Diagnoses or Management Options  Finger laceration, initial encounter:   Diagnosis management comments: Brianna LOBATO sutured laceration see procedure note        Disposition  Final diagnoses:   Finger laceration, initial encounter - left fifth digit distal phalynx     Time reflects when diagnosis was documented in both MDM as applicable and the Disposition within this note     Time User Action Codes Description Comment    8/27/2020  2:06 PM Cricket Dadds Add [C02 811N] Finger laceration, initial encounter     8/27/2020  2:06 PM Cricket Dadds Modify [Y26 414E] Finger laceration, initial encounter left fifth digit distal phalynx      ED Disposition     ED Disposition Condition Date/Time Comment    Discharge Stable Thu Aug 27, 2020  2:06 PM Jaki Simmons discharge to home/self care  Follow-up Information     Follow up With Specialties Details Why Contact Info    Homar Feng,  Family Medicine Go to  10 days suture removal; recheck blood pressure [address] [facility] [place] [id number]  [phone]            Patient's Medications   Discharge Prescriptions    No medications on file     No discharge procedures on file      PDMP Review     None          ED Provider  Electronically Signed by           Tereso Montaño MD  08/27/20 9419

## 2020-08-27 NOTE — ED NOTES
Cleaned up wound and applied bacitracin to sutured site of left 5th finger and abrasion of 4th finger  DSD to left 5th finger, bandaid to left 4th finger       Edmar Roes, RN  08/27/20 2411

## 2021-03-09 DIAGNOSIS — Z23 ENCOUNTER FOR IMMUNIZATION: ICD-10-CM

## 2023-01-23 ENCOUNTER — TRANSCRIBE ORDERS (OUTPATIENT)
Dept: ADMINISTRATIVE | Facility: HOSPITAL | Age: 73
End: 2023-01-23

## 2023-01-23 DIAGNOSIS — M19.072 ARTHRITIS OF LEFT ANKLE: Primary | ICD-10-CM

## 2023-01-23 DIAGNOSIS — Z82.49 FAMILY HISTORY OF ABDOMINAL AORTIC ANEURYSM: ICD-10-CM

## 2023-01-23 DIAGNOSIS — M10.072 IDIOPATHIC GOUT OF LEFT ANKLE, UNSPECIFIED CHRONICITY: ICD-10-CM

## 2023-01-23 DIAGNOSIS — I10 ESSENTIAL HYPERTENSION WITH GOAL BLOOD PRESSURE LESS THAN 140/90: ICD-10-CM

## 2023-01-30 ENCOUNTER — HOSPITAL ENCOUNTER (OUTPATIENT)
Dept: ULTRASOUND IMAGING | Facility: HOSPITAL | Age: 73
Discharge: HOME | End: 2023-01-30

## 2023-01-30 DIAGNOSIS — I10 ESSENTIAL HYPERTENSION WITH GOAL BLOOD PRESSURE LESS THAN 140/90: ICD-10-CM

## 2023-01-30 DIAGNOSIS — Z82.49 FAMILY HISTORY OF ABDOMINAL AORTIC ANEURYSM: ICD-10-CM

## 2023-02-20 ENCOUNTER — APPOINTMENT (OUTPATIENT)
Dept: RADIOLOGY | Facility: MEDICAL CENTER | Age: 73
End: 2023-02-20

## 2023-02-20 DIAGNOSIS — M19.072 ARTHRITIS OF LEFT ANKLE: ICD-10-CM

## 2023-02-20 DIAGNOSIS — M10.072 IDIOPATHIC GOUT OF LEFT ANKLE, UNSPECIFIED CHRONICITY: ICD-10-CM

## 2023-03-02 ENCOUNTER — OFFICE VISIT (OUTPATIENT)
Dept: PODIATRY | Facility: CLINIC | Age: 73
End: 2023-03-02

## 2023-03-02 VITALS
DIASTOLIC BLOOD PRESSURE: 92 MMHG | BODY MASS INDEX: 27.75 KG/M2 | HEIGHT: 73 IN | HEART RATE: 97 BPM | SYSTOLIC BLOOD PRESSURE: 144 MMHG

## 2023-03-02 DIAGNOSIS — M79.675 PAIN OF TOE OF LEFT FOOT: ICD-10-CM

## 2023-03-02 DIAGNOSIS — G62.9 NEUROPATHY: ICD-10-CM

## 2023-03-02 DIAGNOSIS — M1A.9XX1 GOUTY TOPHI OF JOINT: Primary | ICD-10-CM

## 2023-03-02 DIAGNOSIS — M19.079 ARTHRITIS OF BIG TOE: ICD-10-CM

## 2023-03-02 DIAGNOSIS — M20.42 HAMMER TOE OF LEFT FOOT: ICD-10-CM

## 2023-03-02 RX ORDER — CYCLOBENZAPRINE HCL 10 MG
TABLET ORAL
COMMUNITY
Start: 2023-01-04

## 2023-03-02 RX ORDER — DULOXETIN HYDROCHLORIDE 30 MG/1
CAPSULE, DELAYED RELEASE ORAL
COMMUNITY

## 2023-03-02 RX ORDER — LISINOPRIL 10 MG/1
TABLET ORAL
COMMUNITY

## 2023-03-02 RX ORDER — FAMOTIDINE 40 MG/1
TABLET, FILM COATED ORAL
COMMUNITY

## 2023-03-02 RX ORDER — LEVOTHYROXINE SODIUM 0.03 MG/1
TABLET ORAL
COMMUNITY

## 2023-03-02 RX ORDER — METFORMIN HYDROCHLORIDE 500 MG/1
TABLET, EXTENDED RELEASE ORAL
COMMUNITY
Start: 2022-12-27

## 2023-03-02 NOTE — PROGRESS NOTES
Assessment/Plan:    No problem-specific Assessment & Plan notes found for this encounter  Diagnoses and all orders for this visit:    Gouty tophi of joint    Arthritis of big toe    Hammer toe of left foot    Pain of toe of left foot    Neuropathy    Other orders  -     DULoxetine (CYMBALTA) 30 mg delayed release capsule; duloxetine 30 mg capsule,delayed release  -     cyclobenzaprine (FLEXERIL) 10 mg tablet  -     famotidine (PEPCID) 40 MG tablet; famotidine 40 mg tablet (Patient not taking: Reported on 3/2/2023)  -     levothyroxine 25 mcg tablet; levothyroxine 25 mcg tablet (Patient not taking: Reported on 3/2/2023)  -     lisinopril (ZESTRIL) 10 mg tablet; Take by mouth (Patient not taking: Reported on 3/2/2023)  -     metFORMIN (GLUCOPHAGE-XR) 500 mg 24 hr tablet      -I recommended that he follow-up for referral for use of Derotha Langston  -He does have tophaceous changes with minimal joint pain that does not interfere with his activities of daily living  - We discussed that he may be an eventual surgical candidate for fusion of his H IPJ and fusion of his DIPJ but at this point in time I would recommend shoe gear changes, padding and monitoring the deformity over time  - X-rays 3 views weightbearing were reviewed and do show narrowing of the joint spaces with difficulty reviewed the DIPJ of the left second digit  - He is return to clinic as needed for continued care    Subjective:      Patient ID: Sofie Singh is a 67 y o  male  Patient presents for evaluation management of his left first and second toe  He has a history of gout with multiple gouty attacks over the past few years, his last gout attack was 2 years ago  He is not managed on a chronic oral medication, but more diet managed, he notes that based off his diet and alcohol use he can control his gouty attacks    He does have colchicine on use for acute scenarios    He is complaining of a bulbous area on the inner aspect of his left big toe and also the left second toe, he does golf and these areas are somewhat minimally painful for him but do end up sending stabbing pains  The following portions of the patient's history were reviewed and updated as appropriate: allergies, current medications, past family history, past medical history, past social history, past surgical history and problem list     Review of Systems   Constitutional: Negative for chills and fever  HENT: Negative for ear pain and sore throat  Eyes: Negative for pain and visual disturbance  Respiratory: Negative for cough and shortness of breath  Cardiovascular: Negative for chest pain and palpitations  Gastrointestinal: Negative for abdominal pain and vomiting  Genitourinary: Negative for dysuria and hematuria  Musculoskeletal: Negative for arthralgias and back pain  Skin: Negative for color change and rash  Neurological: Negative for seizures and syncope  All other systems reviewed and are negative  Objective:      /92   Pulse 97   Ht 6' 1" (1 854 m)   BMI 27 75 kg/m²          Physical Exam  Musculoskeletal:         General: Swelling and deformity present  No tenderness  Comments: There is mallet toe deformity with no involvement of the PIPJ of the right second digit,    There is distal tip overload and pain with range of motion of the DIPJ, there is pain with range of motion of the H IPJ of the left hallux as well  There is positive palpable tophi in the inner aspect of the toe as well

## 2023-10-12 ENCOUNTER — EVALUATION (OUTPATIENT)
Dept: PHYSICAL THERAPY | Facility: CLINIC | Age: 73
End: 2023-10-12
Payer: MEDICARE

## 2023-10-12 DIAGNOSIS — M48.061 SPINAL STENOSIS OF LUMBAR REGION, UNSPECIFIED WHETHER NEUROGENIC CLAUDICATION PRESENT: Primary | ICD-10-CM

## 2023-10-12 DIAGNOSIS — Z98.1 S/P LUMBAR FUSION: ICD-10-CM

## 2023-10-12 PROCEDURE — 97140 MANUAL THERAPY 1/> REGIONS: CPT | Performed by: PHYSICAL THERAPIST

## 2023-10-12 PROCEDURE — 97110 THERAPEUTIC EXERCISES: CPT | Performed by: PHYSICAL THERAPIST

## 2023-10-12 PROCEDURE — 97161 PT EVAL LOW COMPLEX 20 MIN: CPT | Performed by: PHYSICAL THERAPIST

## 2023-10-12 NOTE — PROGRESS NOTES
PT Evaluation     Today's date: 10/12/2023  Patient name: Marjorie Goodwin  : 1950  MRN: [id number]  Referring provider: Omar Oropeza MD  Dx:   Encounter Diagnosis     ICD-10-CM    1. Spinal stenosis of lumbar region, unspecified whether neurogenic claudication present  M48.061       2. S/P lumbar fusion  Z98.1           Start Time: 1300  Stop Time: 1345  Total time in clinic (min): 45 minutes    Assessment  Assessment details: The patient is a 74 yo male who presents to physical therapy with signs and symptoms consistent with degenerative changes in the lumbar spine with a history of multiple surgeries resulting in lumbar fusion. He has experienced a recent exacerbation of pain and is being referred to pain management. Deficits are noted in lumbar mobility and stability as well B hip strength. Poor flexibility is noted in B hips. He would benefit from a course of skilled physical therapy to address deficits in ROM, strength, stability and functional status. Impairments: abnormal or restricted ROM, impaired physical strength, lacks appropriate home exercise program and pain with function  Understanding of Dx/Px/POC: good   Prognosis: good    Goals  STG(4 weeks):            1. Independent with HEP            2. Decrease pain to 3/10 at worst with functional activities            3.  Increase AROM L/S to [facility]            4. Increase strength by 1/2 MMT grade     LTG(8 weeks):              1. Eliminate pain with functional activities             2. Increase LE strength to 4+ to 5/5             3. Improve LE flexibility to good             4. Return to PLOF/golf       Plan  Patient would benefit from: skilled physical therapy  Planned modality interventions: cryotherapy and thermotherapy: hydrocollator packs  Planned therapy interventions: abdominal trunk stabilization, manual therapy, balance, strengthening, stretching, therapeutic activities, therapeutic exercise, home exercise program, flexibility and neuromuscular re-education  Frequency: 2x week  Duration in weeks: 8  Plan of Care beginning date: 10/12/2023  Plan of Care expiration date: 2023  Treatment plan discussed with: patient        Subjective Evaluation    History of Present Illness  Mechanism of injury: The patient reports a long history of lower back pain. He underwent a laminectomy() and later another surgery to remove a cyst. Several years later, he later had L4-5 fusion(). The pain returned after a few years. He saw another surgeon in  and the fusion was updated. He recently experienced a sharp pain in his back and he returned to his surgeon. Further degeneration was indicated on imaging. He is experiencing burning, numbness and tingling of both feet. He is referred to OPPT at this time. He is seeing pain management on 10-26-23 to discuss an epidural.          Recurrent probem    Quality of life: good    Patient Goals  Patient goals for therapy: decreased pain, return to sport/leisure activities, increased motion and increased strength    Pain  Current pain ratin  At best pain ratin  At worst pain ratin  Location: lower back L>R  Quality: dull ache  Relieving factors: ice and heat (biofreeze)    Social Support  Steps to enter house: yes  Stairs in house: no   Lives in: [facility] house  Lives with: spouse    Employment status: not working        Objective     Concurrent Complaints  Positive for disturbed sleep.  Negative for bladder dysfunction, bowel dysfunction and saddle (S4) numbness    Active Range of Motion     Lumbar   Flexion:  WFL  Extension:  with pain Restriction level: minimal  Left lateral flexion:  Restriction level: minimal  Right lateral flexion:  Restriction level: minimal  Left rotation:  Restriction level: minimal  Right rotation:  Restriction level: minimal    Strength/Myotome Testing     Left Hip   Planes of Motion   Flexion: 4+  Extension: 3+  Abduction: 4+    Right Hip   Planes of Motion   Flexion: 4+  Extension: 3+  Abduction: 4+    Left Knee   Flexion: 4+  Extension: 4+    Right Knee   Flexion: 4+  Extension: 4+    Left Ankle/Foot   Dorsiflexion: 5  Plantar flexion: 5  Inversion: 5  Eversion: 5    Right Ankle/Foot   Dorsiflexion: 4+  Plantar flexion: 4  Inversion: 4  Eversion: 3-    Tests     Lumbar     Left   Negative crossed SLR and passive SLR. Right   Negative crossed SLR and passive SLR. Additional Tests Details  Flexibility: HS Fair B                   Piriformis Fair B                   Hip flexors Poor B             Precautions: Neuropathy  HEP issued to patient.  Diagnosis, prognosis and PT POC discussed with patient       10/12            Manuals             Hip flexor stretch 30"x3 over edge of plint                                      Neuro Re-Ed             PPT 5"x10            PPT w/ SLR 10x ea                         Tandem balance                                                    Ther Ex             HS stretch 30"x4 ea in standing            Piri stretch 30"x4 fig-4 B            Iso hip add             Clam             Bridging                          PB truck stretch             Machine leg press             Machine ham curls             NuStep for hip mobility and ms endurance             Ther Activity             Step ups                                       Modalities

## 2023-10-12 NOTE — LETTER
2023    Omar Oropeza MD  [address] [place], [place] [address] [facility] [address]  [address] [place] [address]    Patient: Marjorie Goodwin   YOB: 1950   Date of Visit: 10/12/2023     Encounter Diagnosis     ICD-10-CM    1. Spinal stenosis of lumbar region, unspecified whether neurogenic claudication present  M48.061       2. S/P lumbar fusion  Z98.1           Dear Dr. Rocco Garcia:    Thank you for your recent referral of Marjorie Goodwin. Please review the attached evaluation summary from Eugene's recent visit. Please verify that you agree with the plan of care by signing the attached order. If you have any questions or concerns, please do not hesitate to call. I sincerely appreciate the opportunity to share in the care of one of your patients and hope to have another opportunity to work with you in the near future. Sincerely,    Danial Pabon, PT      Referring Provider:      I certify that I have read the below Plan of Care and certify the need for these services furnished under this plan of treatment while under my care. Omar Oropeza MD  [address] [place], [place] [address] [address]  [place] [address]  Via Fax: [phone]          PT Evaluation     Today's date: 10/12/2023  Patient name: Marjorie Goodwin  : 1950  MRN: [id number]  Referring provider: Omar Oropeza MD  Dx:   Encounter Diagnosis     ICD-10-CM    1. Spinal stenosis of lumbar region, unspecified whether neurogenic claudication present  M48.061       2. S/P lumbar fusion  Z98.1           Start Time: 1300  Stop Time: 1345  Total time in clinic (min): 45 minutes    Assessment  Assessment details: The patient is a 74 yo male who presents to physical therapy with signs and symptoms consistent with degenerative changes in the lumbar spine with a history of multiple surgeries resulting in lumbar fusion. He has experienced a recent exacerbation of pain and is being referred to pain management.  Deficits are noted in lumbar mobility and stability as well B hip strength. Poor flexibility is noted in B hips. He would benefit from a course of skilled physical therapy to address deficits in ROM, strength, stability and functional status. Impairments: abnormal or restricted ROM, impaired physical strength, lacks appropriate home exercise program and pain with function  Understanding of Dx/Px/POC: good   Prognosis: good    Goals  STG(4 weeks):            1. Independent with HEP            2. Decrease pain to 3/10 at worst with functional activities            3. Increase AROM L/S to [facility]            4. Increase strength by 1/2 MMT grade     LTG(8 weeks):              1. Eliminate pain with functional activities             2. Increase LE strength to 4+ to 5/5             3. Improve LE flexibility to good             4. Return to PLOF/golf       Plan  Patient would benefit from: skilled physical therapy  Planned modality interventions: cryotherapy and thermotherapy: hydrocollator packs  Planned therapy interventions: abdominal trunk stabilization, manual therapy, balance, strengthening, stretching, therapeutic activities, therapeutic exercise, home exercise program, flexibility and neuromuscular re-education  Frequency: 2x week  Duration in weeks: 8  Plan of Care beginning date: 10/12/2023  Plan of Care expiration date: 12/7/2023  Treatment plan discussed with: patient        Subjective Evaluation    History of Present Illness  Mechanism of injury: The patient reports a long history of lower back pain. He underwent a laminectomy(2000) and later another surgery to remove a cyst. Several years later, he later had L4-5 fusion(2002). The pain returned after a few years. He saw another surgeon in 2008 and the fusion was updated. He recently experienced a sharp pain in his back and he returned to his surgeon. Further degeneration was indicated on imaging. He is experiencing burning, numbness and tingling of both feet. He is referred to OPPT at this time.   He is seeing pain management on 10-26-23 to discuss an epidural.          Recurrent probem    Quality of life: good    Patient Goals  Patient goals for therapy: decreased pain, return to sport/leisure activities, increased motion and increased strength    Pain  Current pain ratin  At best pain ratin  At worst pain ratin  Location: lower back L>R  Quality: dull ache  Relieving factors: ice and heat (biofreeze)    Social Support  Steps to enter house: yes  Stairs in house: no   [facility]  Lives with: spouse    Employment status: not working        Objective     Concurrent Complaints  Positive for disturbed sleep. Negative for bladder dysfunction, bowel dysfunction and saddle (S4) numbness    Active Range of Motion     Lumbar   Flexion:  WFL  Extension:  with pain Restriction level: minimal  Left lateral flexion:  Restriction level: minimal  Right lateral flexion:  Restriction level: minimal  Left rotation:  Restriction level: minimal  Right rotation:  Restriction level: minimal    Strength/Myotome Testing     Left Hip   Planes of Motion   Flexion: 4+  Extension: 3+  Abduction: 4+    Right Hip   Planes of Motion   Flexion: 4+  Extension: 3+  Abduction: 4+    Left Knee   Flexion: 4+  Extension: 4+    Right Knee   Flexion: 4+  Extension: 4+    Left Ankle/Foot   Dorsiflexion: 5  Plantar flexion: 5  Inversion: 5  Eversion: 5    Right Ankle/Foot   Dorsiflexion: 4+  Plantar flexion: 4  Inversion: 4  Eversion: 3-    Tests     Lumbar     Left   Negative crossed SLR and passive SLR. Right   Negative crossed SLR and passive SLR. Additional Tests Details  Flexibility: HS Fair B                   Piriformis Fair B                   Hip flexors Poor B             Precautions: Neuropathy  HEP issued to patient.  Diagnosis, prognosis and PT POC discussed with patient       10/12            Manuals             Hip flexor stretch 30"x3 over edge of [facility]                                      Neuro Re-Ed PPT 5"x10            PPT w/ SLR 10x ea                         Tandem balance                                                    Ther Ex             HS stretch 30"x4 ea in standing            Piri stretch 30"x4 fig-4 B            Iso hip add             Clam             Bridging                          PB truck stretch             Machine leg press             Machine ham curls             NuStep for hip mobility and ms endurance             Ther Activity             Step ups                                       Modalities

## 2023-10-16 ENCOUNTER — OFFICE VISIT (OUTPATIENT)
Dept: PHYSICAL THERAPY | Facility: CLINIC | Age: 73
End: 2023-10-16
Payer: MEDICARE

## 2023-10-16 DIAGNOSIS — Z98.1 S/P LUMBAR FUSION: ICD-10-CM

## 2023-10-16 DIAGNOSIS — M48.061 SPINAL STENOSIS OF LUMBAR REGION, UNSPECIFIED WHETHER NEUROGENIC CLAUDICATION PRESENT: Primary | ICD-10-CM

## 2023-10-16 PROCEDURE — 97140 MANUAL THERAPY 1/> REGIONS: CPT | Performed by: PHYSICAL THERAPIST

## 2023-10-16 PROCEDURE — 97110 THERAPEUTIC EXERCISES: CPT | Performed by: PHYSICAL THERAPIST

## 2023-10-16 PROCEDURE — 97112 NEUROMUSCULAR REEDUCATION: CPT | Performed by: PHYSICAL THERAPIST

## 2023-10-16 NOTE — PROGRESS NOTES
Daily Note     Today's date: 10/16/2023  Patient name: Julianne Guaman  : 1950  MRN: [id number]  Referring provider: Penelope Hugo MD  Dx:   Encounter Diagnosis     ICD-10-CM    1. Spinal stenosis of lumbar region, unspecified whether neurogenic claudication present  M48.061       2. S/P lumbar fusion  Z98.1           Start Time: 0845  Stop Time: 0930  Total time in clinic (min): 45 minutes    Subjective: The patient states that he is stiff and sore all over this morning. Objective: See treatment diary below      Assessment: Tandem balance added today to improve stability. Significant muscle tightness remains present in B hips. Tolerated treatment well. Patient would benefit from continued PT      Plan: Continue per plan of care. Precautions: Neuropathy  HEP issued to patient.  Diagnosis, prognosis and PT POC discussed with patient       10/12 10/16           Manuals             Hip flexor stretch 30"x3 over edge of plinth 30"x3 BLE over edge of plinth                                     Neuro Re-Ed             PPT 5"x10 5"X20           PPT w/ SLR 10x ea 2x10 ea                        Tandem balance  30"X2 ea                                                  Ther Ex             HS stretch 30"x4 ea in standing 30"x4 ea in standing           Piri stretch 30"x4 fig-4 B 30"x4 fig-4 B           Iso hip add  5"X20           Clam  L3 20x in HL           Bridging                          PB truck stretch  10"X10 ea dir-for,diag           Machine leg press             Machine ham curls             NuStep for hip mobility and ms endurance  L5 10'           Ther Activity             Step ups                                       Modalities

## 2023-10-19 ENCOUNTER — OFFICE VISIT (OUTPATIENT)
Dept: PHYSICAL THERAPY | Facility: CLINIC | Age: 73
End: 2023-10-19
Payer: MEDICARE

## 2023-10-19 DIAGNOSIS — M48.061 SPINAL STENOSIS OF LUMBAR REGION, UNSPECIFIED WHETHER NEUROGENIC CLAUDICATION PRESENT: Primary | ICD-10-CM

## 2023-10-19 DIAGNOSIS — Z98.1 S/P LUMBAR FUSION: ICD-10-CM

## 2023-10-19 PROCEDURE — 97112 NEUROMUSCULAR REEDUCATION: CPT

## 2023-10-19 PROCEDURE — 97110 THERAPEUTIC EXERCISES: CPT

## 2023-10-19 NOTE — PROGRESS NOTES
Daily Note     Today's date: 10/19/2023  Patient name: Cyrus Snyder  : 1950  MRN: [id number]  Referring provider: Keo Cifuentes MD  Dx:   Encounter Diagnosis     ICD-10-CM    1. Spinal stenosis of lumbar region, unspecified whether neurogenic claudication present  M48.061       2. S/P lumbar fusion  Z98.1           Start Time: 0800  Stop Time: 0850  Total time in clinic (min): 50 minutes    Subjective: Patient reports feeling sore this morning. Patient reports that his back feels better than when he came in.      Objective: See treatment diary below      Assessment: Tolerated treatment well. Patient participated in skilled PT session focused on strengthening, stretching, and ROM. Patient able to compete exercise program with a mild decrease in pain. Patient demonstrates decreased core stabilization with exercises, v.c. for engaging core. Patient is challenged with balance involving NBOS, having mild LOB needing B/L UE support to correct. Patient would continue to benefit from skilled PT interventions to address strengthening, stretching, and ROM. Patient demonstrated fatigue post treatment      Plan: Continue per plan of care. Precautions: Neuropathy  HEP issued to patient.  Diagnosis, prognosis and PT POC discussed with patient       10/12 10/16 10/19          Manuals             Hip flexor stretch 30"x3 over edge of plinth 30"x3 BLE over edge of plinth                                     Neuro Re-Ed             PPT 5"x10 5"X20 5" 20x          PPT w/ SLR 10x ea 2x10 ea 2x10                       Tandem balance  30"X2 ea 30" 2x ea                                                 Ther Ex             HS stretch 30"x4 ea in standing 30"x4 ea in standing 30" 4x ea in standig          Piri stretch 30"x4 fig-4 B 30"x4 fig-4 B 30" 4x fig-4 B/L          Iso hip add  5"X20 5" 20x          Clam  L3 20x in HL L3 5" 20x in HL          Bridging                          PB truck stretch  10"X10 ea dir-for,diag 10" 10x ea F/L/R          Machine leg press             Machine ham curls             NuStep for hip mobility and ms endurance  L5 10' L5 x 10 min          Ther Activity             Step ups                                       Modalities

## 2023-10-23 ENCOUNTER — OFFICE VISIT (OUTPATIENT)
Dept: PHYSICAL THERAPY | Facility: CLINIC | Age: 73
End: 2023-10-23
Payer: MEDICARE

## 2023-10-23 DIAGNOSIS — Z98.1 S/P LUMBAR FUSION: ICD-10-CM

## 2023-10-23 DIAGNOSIS — M48.061 SPINAL STENOSIS OF LUMBAR REGION, UNSPECIFIED WHETHER NEUROGENIC CLAUDICATION PRESENT: Primary | ICD-10-CM

## 2023-10-23 PROCEDURE — 97110 THERAPEUTIC EXERCISES: CPT

## 2023-10-23 PROCEDURE — 97112 NEUROMUSCULAR REEDUCATION: CPT

## 2023-10-23 NOTE — PROGRESS NOTES
Daily Note     Today's date: 10/23/2023  Patient name: Lannis Gilford  : 1950  MRN: [id number]  Referring provider: Ashvin Braden MD  Dx:   Encounter Diagnosis     ICD-10-CM    1. Spinal stenosis of lumbar region, unspecified whether neurogenic claudication present  M48.061       2. S/P lumbar fusion  Z98.1           Start Time: 0845  Stop Time: 09  Total time in clinic (min): 45 minutes    Subjective: Patient reports feeling sore in his low back today. He states he felt like he worked after last session but was okay. Objective: See treatment diary below      Assessment: Tolerated treatment well without new complaints or concerns. Cuing was given throughout session for proper form; good carryover. Patient would continue to benefit from skilled PT interventions to address strengthening, stretching, and ROM. Patient demonstrated fatigue post treatment      Plan: Continue per plan of care. Precautions: Neuropathy  HEP issued to patient.  Diagnosis, prognosis and PT POC discussed with patient       10/12 10/16 10/19 10/23         Manuals             Hip flexor stretch 30"x3 over edge of plinth 30"x3 BLE over edge of plinth  KL                                   Neuro Re-Ed             PPT 5"x10 5"X20 5" 20x 5" 20x         PPT w/ SLR 10x ea 2x10 ea 2x10 2x10                      Tandem balance  30"X2 ea 30" 2x ea 30" 4x ea                                                Ther Ex             HS stretch 30"x4 ea in standing 30"x4 ea in standing 30" 4x ea in standig 30" 4x ea in standig         Piri stretch 30"x4 fig-4 B 30"x4 fig-4 B 30" 4x fig-4 B/L 30" 4x fig-4 B/L         Iso hip add  5"X20 5" 20x 5" 20x         Clam  L3 20x in HL L3 5" 20x in HL L3 5" 20x in HL         Bridging                          PB truck stretch  10"X10 ea dir-for,diag 10" 10x ea F/L/R 10" 10x ea F/L/R         Machine leg press             Machine ham curls             NuStep for hip mobility and ms endurance  L5 10' L5 x 10 min L5 x 10 min         Ther Activity             Step ups                                       Modalities

## 2023-10-25 ENCOUNTER — OFFICE VISIT (OUTPATIENT)
Dept: PHYSICAL THERAPY | Facility: CLINIC | Age: 73
End: 2023-10-25
Payer: MEDICARE

## 2023-10-25 DIAGNOSIS — M48.061 SPINAL STENOSIS OF LUMBAR REGION, UNSPECIFIED WHETHER NEUROGENIC CLAUDICATION PRESENT: Primary | ICD-10-CM

## 2023-10-25 DIAGNOSIS — Z98.1 S/P LUMBAR FUSION: ICD-10-CM

## 2023-10-25 PROCEDURE — 97112 NEUROMUSCULAR REEDUCATION: CPT

## 2023-10-25 PROCEDURE — 97110 THERAPEUTIC EXERCISES: CPT

## 2023-10-25 NOTE — PROGRESS NOTES
Daily Note     Today's date: 10/25/2023  Patient name: Elizabeth Mcnair  : 1950  MRN: [id number]  Referring provider: Jackelin Guzman MD  Dx:   Encounter Diagnosis     ICD-10-CM    1. Spinal stenosis of lumbar region, unspecified whether neurogenic claudication present  M48.061       2. S/P lumbar fusion  Z98.1                      Subjective: Patient reports that he has L side aching today that may jave been caused by cutting the grass yesterday, he was getting "jostled around slightly."      Objective: See treatment diary below. Increased resistance on HL hip abd today. Incorporated bridging. Assessment: Tolerated treatment well. Patient would benefit from continued PT to improve core strength and stability to improve lower back pain and function. His B hip flexors has noted limitations. R side hip flexor more limited, verbal cues to perform PPT during stretch due to lower back pull. Plan: Continue per plan of care. Precautions: Neuropathy  HEP issued to patient.  Diagnosis, prognosis and PT POC discussed with patient       10/12 10/16 10/19 10/23 10/25     Manuals          Hip flexor stretch 30"x3 over edge of plinth 30"x3 BLE over edge of plinth  KL Supine high table-CM                         Neuro Re-Ed          PPT 5"x10 5"X20 5" 20x 5" 20x 5"x20     PPT w/ SLR 10x ea 2x10 ea 2x10 2x10 2x10               Tandem balance  30"X2 ea 30" 2x ea 30" 4x ea 30"x4 ea                                   Ther Ex          HS stretch 30"x4 ea in standing 30"x4 ea in standing 30" 4x ea in standig 30" 4x ea in standig 8" Box standing 30"x4 ea     Piri stretch 30"x4 fig-4 B 30"x4 fig-4 B 30" 4x fig-4 B/L 30" 4x fig-4 B/L 30"x4 ea fig 4      Iso hip add  5"X20 5" 20x 5" 20x 5"x20     Clam  L3 20x in HL L3 5" 20x in HL L3 5" 20x in HL L4 2x10 in HL     Bridging     3"x20               PB truck stretch  10"X10 ea dir-for,diag 10" 10x ea F/L/R 10" 10x ea F/L/R 10"x10 ea     Machine leg press          Machine ham malina Varela for hip mobility and ms endurance  L5 10' L5 x 10 min L5 x 10 min L5 10' min     Ther Activity          Step ups                              Modalities

## 2023-10-30 ENCOUNTER — OFFICE VISIT (OUTPATIENT)
Dept: PHYSICAL THERAPY | Facility: CLINIC | Age: 73
End: 2023-10-30
Payer: MEDICARE

## 2023-10-30 DIAGNOSIS — Z98.1 S/P LUMBAR FUSION: ICD-10-CM

## 2023-10-30 DIAGNOSIS — M48.061 SPINAL STENOSIS OF LUMBAR REGION, UNSPECIFIED WHETHER NEUROGENIC CLAUDICATION PRESENT: Primary | ICD-10-CM

## 2023-10-30 PROCEDURE — 97110 THERAPEUTIC EXERCISES: CPT

## 2023-10-30 PROCEDURE — 97112 NEUROMUSCULAR REEDUCATION: CPT

## 2023-10-30 NOTE — PROGRESS NOTES
Daily Note     Today's date: 10/30/2023  Patient name: Elif Obrien  : 1950  MRN: [id number]  Referring provider: Samuel Lange MD  Dx:   Encounter Diagnosis     ICD-10-CM    1. Spinal stenosis of lumbar region, unspecified whether neurogenic claudication present  M48.061       2. S/P lumbar fusion  Z98.1                      Subjective: Patient reports that he has pain in his L side lower back. He was up/down a ladder putting lights up. He also has some stiffness in L side. Objective: See treatment diary below. Assessment: Tolerated treatment well. Patient would benefit from continued PT to reduce lower back pain and manage symptoms. He has limited hip flexor mobility and tightness t/o paraspinals noted, assessed with STM. MHP to reduce tightness today. He has some relief post treatment. Plan: Continue per plan of care. Precautions: Neuropathy  HEP issued to patient.  Diagnosis, prognosis and PT POC discussed with patient       10/12 10/16 10/19 10/23 10/25 10/30    Manuals          Hip flexor stretch 30"x3 over edge of plinth 30"x3 BLE over edge of plinth  KL Supine high table- CM Supine high table    STM      CM              Neuro Re-Ed          PPT 5"x10 5"X20 5" 20x 5" 20x 5"x20 5"x20    PPT w/ SLR 10x ea 2x10 ea 2x10 2x10 2x10 2x10              Tandem balance  30"X2 ea 30" 2x ea 30" 4x ea 30"x4 ea 30"x4 ea                                  Ther Ex          HS stretch  Standing  30"x4 ea in standing 30"x4 ea in standing 30" 4x ea in standig 30" 4x ea in standig 8" Box standing 30"x4 ea 16" Box 30"x4 ea    Piri stretch and fig 4 30"x4 fig-4 B 30"x4 fig-4 B 30" 4x fig-4 B/L 30" 4x fig-4 B/L 30"x4 ea fig 4  30"x4 ea    Iso hip add  5"X20 5" 20x 5" 20x 5"x20 5"x20    Clam  L3 20x in HL L3 5" 20x in HL L3 5" 20x in HL L4 2x10 in HL L4 2x10 in HL    Bridging     3"x20 3"x20              PB truck stretch  10"X10 ea dir-for,diag 10" 10x ea F/L/R 10" 10x ea F/L/R 10"x10 ea 10"x10 ea Machine leg press          Machine ham curls          NuStep for hip mobility and ms endurance  L5 10' L5 x 10 min L5 x 10 min L5 10' min L6 10'    Ther Activity          Step ups                              Modalities          MHP      Post TE 10' supine bolster under knees

## 2023-11-01 ENCOUNTER — OFFICE VISIT (OUTPATIENT)
Dept: PHYSICAL THERAPY | Facility: CLINIC | Age: 73
End: 2023-11-01
Payer: MEDICARE

## 2023-11-01 DIAGNOSIS — M48.061 SPINAL STENOSIS OF LUMBAR REGION, UNSPECIFIED WHETHER NEUROGENIC CLAUDICATION PRESENT: Primary | ICD-10-CM

## 2023-11-01 DIAGNOSIS — Z98.1 S/P LUMBAR FUSION: ICD-10-CM

## 2023-11-01 PROCEDURE — 97110 THERAPEUTIC EXERCISES: CPT

## 2023-11-01 PROCEDURE — 97112 NEUROMUSCULAR REEDUCATION: CPT

## 2023-11-01 NOTE — PROGRESS NOTES
Daily Note     Today's date: 2023  Patient name: Estrella Fraga  : 1950  MRN: [id number]  Referring provider: Hever Fleming MD  Dx:   Encounter Diagnosis     ICD-10-CM    1. Spinal stenosis of lumbar region, unspecified whether neurogenic claudication present  M48.061       2. S/P lumbar fusion  Z98.1                      Subjective: Patient reports that he has some L side LBP from doing yard work yesterday. Objective: See treatment diary below. Incorporated HERLINDA, anti-rotations, and LTR into program today. Assessment: Tolerated treatment well. Patient would benefit from continued PT to improve core/uuper trunk strength to reduce and manage LBP symptoms. He was able to tolerate progression w/o issue. Plan: Continue per plan of care. Precautions: Neuropathy  HEP issued to patient.  Diagnosis, prognosis and PT POC discussed with patient       10/12 10/16 10/19 10/23 10/25 10/30 11/1   Manuals          Hip flexor stretch 30"x3 over edge of plinth 30"x3 BLE over edge of plinth  KL Supine high table- CM Supine high table Supine high table   STM      CM              Neuro Re-Ed          PPT 5"x10 5"X20 5" 20x 5" 20x 5"x20 5"x20    PPT w/ SLR 10x ea 2x10 ea 2x10 2x10 2x10 2x10 2x10             Tandem balance  30"X2 ea 30" 2x ea 30" 4x ea 30"x4 ea 30"x4 ea 30"x3 ea   HERLINDA       P5 2x10   Anti-Rotation       P1 10x ea   Side stepping       L2 5x//   Ther Ex          HS stretch  Standing  30"x4 ea in standing 30"x4 ea in standing 30" 4x ea in standig 30" 4x ea in standig 8" Box standing 30"x4 ea 16" Box 30"x4 ea 16" Box 30"X4 ea   Piri stretch and fig 4 30"x4 fig-4 B 30"x4 fig-4 B 30" 4x fig-4 B/L 30" 4x fig-4 B/L 30"x4 ea fig 4  30"x4 ea 30"x4 ea   Iso hip add  5"X20 5" 20x 5" 20x 5"x20 5"x20 5"x20   Clam  L3 20x in HL L3 5" 20x in HL L3 5" 20x in HL L4 2x10 in HL L4 2x10 in HL L4 2x10 in HL   Bridging     3"x20 3"x20 3"x20   LTR       10"x10 ea   PB truck stretch  10"X10 ea dir-for,diag 10" 10x ea F/L/R 10" 10x ea F/L/R 10"x10 ea 10"x10 ea 10"X10 ea   Machine leg press          Machine ham curls          NuStep for hip mobility and ms endurance  L5 10' L5 x 10 min L5 x 10 min L5 10' min L6 10' L6 10'   Ther Activity          Step ups                              Modalities          MHP      Post TE 10' supine bolster under knees

## 2023-11-06 ENCOUNTER — OFFICE VISIT (OUTPATIENT)
Dept: PHYSICAL THERAPY | Facility: CLINIC | Age: 73
End: 2023-11-06
Payer: MEDICARE

## 2023-11-06 DIAGNOSIS — Z98.1 S/P LUMBAR FUSION: ICD-10-CM

## 2023-11-06 DIAGNOSIS — M48.061 SPINAL STENOSIS OF LUMBAR REGION, UNSPECIFIED WHETHER NEUROGENIC CLAUDICATION PRESENT: Primary | ICD-10-CM

## 2023-11-06 PROCEDURE — 97110 THERAPEUTIC EXERCISES: CPT | Performed by: PHYSICAL THERAPIST

## 2023-11-06 PROCEDURE — 97112 NEUROMUSCULAR REEDUCATION: CPT | Performed by: PHYSICAL THERAPIST

## 2023-11-06 NOTE — PROGRESS NOTES
Daily Note     Today's date: 2023  Patient name: Tatum Calderón  : 1950  MRN: [id number]  Referring provider: Natalie Lewis MD  Dx:   Encounter Diagnosis     ICD-10-CM    1. Spinal stenosis of lumbar region, unspecified whether neurogenic claudication present  M48.061       2. S/P lumbar fusion  Z98.1           Start Time: 1015  Stop Time: 1100  Total time in clinic (min): 45 minutes    Subjective: The patient denies pain today. He is scheduled for an injection tomorrow. Objective: See treatment diary below      Assessment: B hip tightness remains present. Strengthening exercises progressed with good tolerance. Tolerated treatment well. Patient would benefit from continued PT      Plan: Continue per plan of care. Precautions: Neuropathy  HEP issued to patient.  Diagnosis, prognosis and PT POC discussed with patient       11/6 10/16 10/19 10/23 10/25 10/30 11/1   Manuals          Hip flexor stretch 30"x3 over edge of plinth 30"x3 BLE over edge of plinth  KL Supine high table- CM Supine high table Supine high table   STM      CM              Neuro Re-Ed          PPT  5"X20 5" 20x 5" 20x 5"x20 5"x20    PPT w/ SLR HEP 2x10 ea 2x10 2x10 2x10 2x10 2x10             Tandem balance 30"x2 ea 30"X2 ea 30" 2x ea 30" 4x ea 30"x4 ea 30"x4 ea 30"x3 ea   HERLINDA P5 2x10      P5 2x10   Anti-Rotation P2 2x10ea      P1 10x ea   Side stepping L3 5x //bars      L2 5x//   Ther Ex          HS stretch  Standing  30"x4 ea 16" box 30"x4 ea in standing 30" 4x ea in standig 30" 4x ea in standig 8" Box standing 30"x4 ea 16" Box 30"x4 ea 16" Box 30"X4 ea   Piri stretch and fig 4 30"x4 fig-4 B 30"x4 fig-4 B 30" 4x fig-4 B/L 30" 4x fig-4 B/L 30"x4 ea fig 4  30"x4 ea 30"x4 ea   Iso hip add HEP 5"X20 5" 20x 5" 20x 5"x20 5"x20 5"x20   Clam HEP L3 20x in HL L3 5" 20x in HL L3 5" 20x in HL L4 2x10 in HL L4 2x10 in HL L4 2x10 in HL   Bridging HEP    3"x20 3"x20 3"x20   LTR       10"x10 ea   PB truck stretch 10"x10 ea 10"X10 ea dir-for,diag 10" 10x ea F/L/R 10" 10x ea F/L/R 10"x10 ea 10"x10 ea 10"X10 ea   Machine leg press  S8 P3 2x10         Machine ham curls  S7 P5 2x10         NuStep for hip mobility and ms endurance Bike L6 10' L5 10' L5 x 10 min L5 x 10 min L5 10' min L6 10' L6 10'   Ther Activity          Step ups                              Modalities          MHP      Post TE 10' supine bolster under knees

## 2023-11-08 ENCOUNTER — OFFICE VISIT (OUTPATIENT)
Dept: PHYSICAL THERAPY | Facility: CLINIC | Age: 73
End: 2023-11-08
Payer: MEDICARE

## 2023-11-08 DIAGNOSIS — M48.061 SPINAL STENOSIS OF LUMBAR REGION, UNSPECIFIED WHETHER NEUROGENIC CLAUDICATION PRESENT: Primary | ICD-10-CM

## 2023-11-08 DIAGNOSIS — Z98.1 S/P LUMBAR FUSION: ICD-10-CM

## 2023-11-08 PROCEDURE — 97110 THERAPEUTIC EXERCISES: CPT

## 2023-11-08 PROCEDURE — 97112 NEUROMUSCULAR REEDUCATION: CPT

## 2023-11-08 NOTE — PROGRESS NOTES
Daily Note     Today's date: 2023  Patient name: Maureen Estevez  : 1950  MRN: [id number]  Referring provider: Danilo Hernandez MD  Dx:   Encounter Diagnosis     ICD-10-CM    1. Spinal stenosis of lumbar region, unspecified whether neurogenic claudication present  M48.061       2. S/P lumbar fusion  Z98.1                      Subjective: Patient reports that he had injections yesterday. He said some tightness in the B knees. No soreness from injections. His lower back has minimal pain today. Objective: See treatment diary below. Assessment: Tolerated treatment well. Patient would benefit from continued PT to improve cesar hip, upper trunk/core to improve endurance and stability in lower back. He did well today despite injections yesterday. Patient has limited hip flexor mobility. Plan: Continue per plan of care. Precautions: Neuropathy  HEP issued to patient.  Diagnosis, prognosis and PT POC discussed with patient       11/6 11/8 10/19 10/23 10/25 10/30 11/1   Manuals          Hip flexor stretch 30"x3 over edge of plinth 30"x3 over edge of plinth  KL Supine high table- CM Supine high table Supine high table   STM      CM              Neuro Re-Ed          PPT   5" 20x 5" 20x 5"x20 5"x20    PPT w/ SLR HEP  2x10 2x10 2x10 2x10 2x10             Tandem balance 30"x2 ea 30"x3 ea 30" 2x ea 30" 4x ea 30"x4 ea 30"x4 ea 30"x3 ea   HERLINDA P5 2x10 P5 2x10     P5 2x10   Anti-Rotation P2 2x10ea P2 2x10     P1 10x ea   Side stepping L3 5x //bars L3 5x//      L2 5x//   Ther Ex          HS stretch  Standing  30"x4 ea 16" box 30"x4 ea 16" box 30" 4x ea in standig 30" 4x ea in standig 8" Box standing 30"x4 ea 16" Box 30"x4 ea 16" Box 30"X4 ea   Piri stretch and fig 4 30"x4 fig-4 B 30"x4 fig-4 B 30" 4x fig-4 B/L 30" 4x fig-4 B/L 30"x4 ea fig 4  30"x4 ea 30"x4 ea   Iso hip add HEP  5" 20x 5" 20x 5"x20 5"x20 5"x20   Clam HEP  L3 5" 20x in HL L3 5" 20x in HL L4 2x10 in HL L4 2x10 in HL L4 2x10 in HL   Bridging HEP 3"x20 3"x20 3"x20   LTR       10"x10 ea   PB truck stretch 10"x10 ea 10"x10 ea 10" 10x ea F/L/R 10" 10x ea F/L/R 10"x10 ea 10"x10 ea 10"X10 ea   Machine leg press  S8 P3 2x10 P3 2x10        Machine ham curls  S7 P5 2x10 P5 2x10        NuStep for hip mobility and ms endurance Bike L6 10' Bike L6 10' L5 x 10 min L5 x 10 min L5 10' min L6 10' L6 10'   Ther Activity          Step ups                              Modalities          MHP  CP in supine post TE 10'    Post TE 10' supine bolster under knees

## 2023-11-13 ENCOUNTER — APPOINTMENT (OUTPATIENT)
Dept: PHYSICAL THERAPY | Facility: CLINIC | Age: 73
End: 2023-11-13
Payer: MEDICARE

## 2023-11-14 ENCOUNTER — EVALUATION (OUTPATIENT)
Dept: PHYSICAL THERAPY | Facility: CLINIC | Age: 73
End: 2023-11-14
Payer: MEDICARE

## 2023-11-14 DIAGNOSIS — M48.061 SPINAL STENOSIS OF LUMBAR REGION, UNSPECIFIED WHETHER NEUROGENIC CLAUDICATION PRESENT: Primary | ICD-10-CM

## 2023-11-14 DIAGNOSIS — Z98.1 S/P LUMBAR FUSION: ICD-10-CM

## 2023-11-14 PROCEDURE — 97112 NEUROMUSCULAR REEDUCATION: CPT | Performed by: PHYSICAL THERAPIST

## 2023-11-14 PROCEDURE — 97110 THERAPEUTIC EXERCISES: CPT | Performed by: PHYSICAL THERAPIST

## 2023-11-14 NOTE — PROGRESS NOTES
PT Re-Evaluation     Today's date: 2023  Patient name: Estrella Fraga  : 1950  MRN: [id number]  Referring provider: Hever Fleming MD  Dx:   Encounter Diagnosis     ICD-10-CM    1. Spinal stenosis of lumbar region, unspecified whether neurogenic claudication present  M48.061       2. S/P lumbar fusion  Z98.1           Start Time: 1100  Stop Time: 1150  Total time in clinic (min): 50 minutes    Assessment  Assessment details: The patient is progressing well with physical therapy treatment with gains noted in LE strength. Mild deficits remain in lumbar mobility and stability. Significant tightness is present in B hip flexors. The patient notes increased functional ability at home. He continues to reports constant numbness and tingling in both feet. He would benefit from continued therapy to further improve core stability and strength. Impairments: abnormal or restricted ROM, impaired physical strength, lacks appropriate home exercise program and pain with function    Goals  STG(4 weeks):            1. Independent with HEP  MET            2. Decrease pain to 3/10 at worst with functional activities  MET            3.  Increase AROM L/S to [facility]  MET            4. Increase strength by 1/2 MMT grade  MET     LTG(8 weeks):              1. Eliminate pain with functional activities  ONGOING             2. Increase LE strength to 4+ to 5/5 PARTIALLY MET             3. Improve LE flexibility to good ONGOING             4. Return to PLOF/golf   ONGOING    Plan  Patient would benefit from: skilled physical therapy  Planned modality interventions: cryotherapy and thermotherapy: hydrocollator packs  Planned therapy interventions: abdominal trunk stabilization, manual therapy, balance, strengthening, stretching, therapeutic activities, therapeutic exercise, home exercise program, flexibility and neuromuscular re-education  Frequency: 2x week  Duration in weeks: 4  Plan of Care beginning date: 2023  Plan of Care expiration date: 12/12/2023  Treatment plan discussed with: patient        Subjective Evaluation    History of Present Illness  Mechanism of injury: The patient reports a long history of lower back pain. He underwent a laminectomy(2000) and later another surgery to remove a cyst. Several years later, he later had L4-5 fusion(2002). The pain returned after a few years. He saw another surgeon in 2008 and the fusion was updated. He recently experienced a sharp pain in his back and he returned to his surgeon. Further degeneration was indicated on imaging. He is experiencing burning, numbness and tingling of both feet. He is referred to OPPT at this time. He is seeing pain management on 10-26-23 to discuss an epidural.    UPDATE 11/14/23: The patient currently denies lower back pain. He continues to have numbness and tingling in both feet. He received facet injections 2 weeks ago which helped with his pain. Patient Goals  Patient goals for therapy: decreased pain, return to sport/leisure activities, increased motion and increased strength          Objective     Concurrent Complaints  Positive for disturbed sleep.  Negative for bladder dysfunction, bowel dysfunction and saddle (S4) numbness    Active Range of Motion     Lumbar   Flexion:  WFL  Extension:  with pain Restriction level: minimal  Left lateral flexion:  Restriction level: minimal  Right lateral flexion:  Restriction level: minimal  Left rotation:  Restriction level: minimal  Right rotation:  Restriction level: minimal    Strength/Myotome Testing     Left Hip   Planes of Motion   Flexion: 4+  Extension: 3+  Abduction: 4+    Right Hip   Planes of Motion   Flexion: 4+  Extension: 3+  Abduction: 4+    Left Knee   Flexion: 4+  Extension: 4+    Right Knee   Flexion: 4+  Extension: 4+    Left Ankle/Foot   Dorsiflexion: 5  Plantar flexion: 5  Inversion: 5  Eversion: 5    Right Ankle/Foot   Dorsiflexion: 4+  Plantar flexion: 4  Inversion: 4  Eversion: 3-    Tests Lumbar     Left   Negative crossed SLR and passive SLR. Right   Negative crossed SLR and passive SLR. Additional Tests Details  Flexibility: HS Fair B                   Piriformis Fair B                   Hip flexors Poor B             Precautions: Neuropathy  HEP issued to patient.  Diagnosis, prognosis and PT POC discussed with patient       11/6 11/8 11/14 10/23 10/25 10/30 11/1   Manuals          Hip flexor stretch 30"x3 over edge of plinth 30"x3 over edge of plinth 30"x3 over edge of plinth KL Supine high table- CM Supine high table Supine high table   STM      CM              Neuro Re-Ed          PPT    5" 20x 5"x20 5"x20    PPT w/ SLR HEP   2x10 2x10 2x10 2x10             Tandem balance 30"x2 ea 30"x3 ea 30" 2x ea 30" 4x ea 30"x4 ea 30"x4 ea 30"x3 ea   HERLINDA P5 2x10 P5 2x10 NP    P5 2x10   Anti-Rotation P2 2x10ea P2 2x10 NP    P1 10x ea   Side stepping L3 5x //bars L3 5x//  L3 5x //bars    L2 5x//   Ther Ex          HS stretch  Standing  30"x4 ea 16" box 30"x4 ea 16" box 30" 4x ea in standig 30" 4x ea in standig 8" Box standing 30"x4 ea 16" Box 30"x4 ea 16" Box 30"X4 ea   Piri stretch and fig 4 30"x4 fig-4 B 30"x4 fig-4 B 30" 4x fig-4 B/L 30" 4x fig-4 B/L 30"x4 ea fig 4  30"x4 ea 30"x4 ea   Iso hip add HEP   5" 20x 5"x20 5"x20 5"x20   Clam HEP   L3 5" 20x in HL L4 2x10 in HL L4 2x10 in HL L4 2x10 in HL   Bridging HEP    3"x20 3"x20 3"x20   LTR       10"x10 ea   PB truck stretch 10"x10 ea 10"x10 ea 10" 10x ea F/L/R 10" 10x ea F/L/R 10"x10 ea 10"x10 ea 10"X10 ea   Machine leg press  S8 P3 2x10 P3 2x10 P4 2x10       Machine ham curls  S7 P5 2x10 P5 2x10 P5 2x10       NuStep for hip mobility and ms endurance Bike L6 10' Bike L6 10' Bike L6 x 10 min L5 x 10 min L5 10' min L6 10' L6 10'   Ther Activity          Step ups                              Modalities          MHP  CP in supine post TE 10'    Post TE 10' supine bolster under knees

## 2023-11-14 NOTE — LETTER
2023    Danilo Hernandez MD  [address] [employer] [id number]  [address] [place] [address]    Patient: Maureen Estevez   YOB: 1950   Date of Visit: 2023     Encounter Diagnosis     ICD-10-CM    1. Spinal stenosis of lumbar region, unspecified whether neurogenic claudication present  M48.061       2. S/P lumbar fusion  Z98.1           Dear Dr. Ward [place]:    Thank you for your recent referral of Maureen Estevez. Please review the attached evaluation summary from Eugene's recent visit. Please verify that you agree with the plan of care by signing the attached order. If you have any questions or concerns, please do not hesitate to call. I sincerely appreciate the opportunity to share in the care of one of your patients and hope to have another opportunity to work with you in the near future. Sincerely,    Cheng Chowdhury, PT      Referring Provider:      I certify that I have read the below Plan of Care and certify the need for these services furnished under this plan of treatment while under my care. Danilo Hernandez MD  [facility] [address]  [place] [address]  Via Fax: [phone]          PT Re-Evaluation     Today's date: 2023  Patient name: Maurene Estevez  : 1950  MRN: [id number]  Referring provider: Danilo Hernandez MD  Dx:   Encounter Diagnosis     ICD-10-CM    1. Spinal stenosis of lumbar region, unspecified whether neurogenic claudication present  M48.061       2. S/P lumbar fusion  Z98.1           Start Time: 1100  Stop Time: 1150  Total time in clinic (min): 50 minutes    Assessment  Assessment details: The patient is progressing well with physical therapy treatment with gains noted in LE strength. Mild deficits remain in lumbar mobility and stability. Significant tightness is present in B hip flexors. The patient notes increased functional ability at home. He continues to reports constant numbness and tingling in both feet.  He would benefit from continued therapy to further improve core stability and strength. Impairments: abnormal or restricted ROM, impaired physical strength, lacks appropriate home exercise program and pain with function    Goals  STG(4 weeks):            1. Independent with HEP  MET            2. Decrease pain to 3/10 at worst with functional activities  MET            3. Increase AROM L/S to [facility]  MET            4. Increase strength by 1/2 MMT grade  MET     LTG(8 weeks):              1. Eliminate pain with functional activities  ONGOING             2. Increase LE strength to 4+ to 5/5 PARTIALLY MET             3. Improve LE flexibility to good ONGOING             4. Return to PLOF/golf   ONGOING    Plan  Patient would benefit from: skilled physical therapy  Planned modality interventions: cryotherapy and thermotherapy: hydrocollator packs  Planned therapy interventions: abdominal trunk stabilization, manual therapy, balance, strengthening, stretching, therapeutic activities, therapeutic exercise, home exercise program, flexibility and neuromuscular re-education  Frequency: 2x week  Duration in weeks: 4  Plan of Care beginning date: 11/14/2023  Plan of Care expiration date: 12/12/2023  Treatment plan discussed with: patient        Subjective Evaluation    History of Present Illness  Mechanism of injury: The patient reports a long history of lower back pain. He underwent a laminectomy(2000) and later another surgery to remove a cyst. Several years later, he later had L4-5 fusion(2002). The pain returned after a few years. He saw another surgeon in 2008 and the fusion was updated. He recently experienced a sharp pain in his back and he returned to his surgeon. Further degeneration was indicated on imaging. He is experiencing burning, numbness and tingling of both feet. He is referred to OPPT at this time. He is seeing pain management on 10-26-23 to discuss an epidural.    UPDATE 11/14/23:  The patient currently denies lower back pain. He continues to have numbness and tingling in both feet. He received facet injections 2 weeks ago which helped with his pain. Patient Goals  Patient goals for therapy: decreased pain, return to sport/leisure activities, increased motion and increased strength          Objective     Concurrent Complaints  Positive for disturbed sleep. Negative for bladder dysfunction, bowel dysfunction and saddle (S4) numbness    Active Range of Motion     Lumbar   Flexion:  WFL  Extension:  with pain Restriction level: minimal  Left lateral flexion:  Restriction level: minimal  Right lateral flexion:  Restriction level: minimal  Left rotation:  Restriction level: minimal  Right rotation:  Restriction level: minimal    Strength/Myotome Testing     Left Hip   Planes of Motion   Flexion: 4+  Extension: 3+  Abduction: 4+    Right Hip   Planes of Motion   Flexion: 4+  Extension: 3+  Abduction: 4+    Left Knee   Flexion: 4+  Extension: 4+    Right Knee   Flexion: 4+  Extension: 4+    Left Ankle/Foot   Dorsiflexion: 5  Plantar flexion: 5  Inversion: 5  Eversion: 5    Right Ankle/Foot   Dorsiflexion: 4+  Plantar flexion: 4  Inversion: 4  Eversion: 3-    Tests     Lumbar     Left   Negative crossed SLR and passive SLR. Right   Negative crossed SLR and passive SLR. Additional Tests Details  Flexibility: HS Fair B                   Piriformis Fair B                   Hip flexors Poor B             Precautions: Neuropathy  HEP issued to patient.  Diagnosis, prognosis and PT POC discussed with patient       11/6 11/8 11/14 10/23 10/25 10/30 11/1   Manuals          Hip flexor stretch 30"x3 over edge of plinth 30"x3 over edge of plinth 30"x3 over edge of plinth KL Supine high table- CM Supine high table Supine high table   STM      CM              Neuro Re-Ed          PPT    5" 20x 5"x20 5"x20    PPT w/ SLR HEP   2x10 2x10 2x10 2x10             Tandem balance 30"x2 ea 30"x3 ea 30" 2x ea 30" 4x ea 30"x4 ea 30"x4 ea 30"x3 ea   HERLINDA P5 2x10 P5 2x10 NP    P5 2x10   Anti-Rotation P2 2x10ea P2 2x10 NP    P1 10x ea   Side stepping L3 5x //bars L3 5x//  L3 5x //bars    L2 5x//   Ther Ex          HS stretch  Standing  30"x4 ea 16" box 30"x4 ea 16" box 30" 4x ea in standig 30" 4x ea in standig 8" Box standing 30"x4 ea 16" Box 30"x4 ea 16" Box 30"X4 ea   Piri stretch and fig 4 30"x4 fig-4 B 30"x4 fig-4 B 30" 4x fig-4 B/L 30" 4x fig-4 B/L 30"x4 ea fig 4  30"x4 ea 30"x4 ea   Iso hip add HEP   5" 20x 5"x20 5"x20 5"x20   Clam HEP   L3 5" 20x in HL L4 2x10 in HL L4 2x10 in HL L4 2x10 in HL   Bridging HEP    3"x20 3"x20 3"x20   LTR       10"x10 ea   PB truck stretch 10"x10 ea 10"x10 ea 10" 10x ea F/L/R 10" 10x ea F/L/R 10"x10 ea 10"x10 ea 10"X10 ea   Machine leg press  S8 P3 2x10 P3 2x10 P4 2x10       Machine ham curls  S7 P5 2x10 P5 2x10 P5 2x10       NuStep for hip mobility and ms endurance Bike L6 10' Bike L6 10' Bike L6 x 10 min L5 x 10 min L5 10' min L6 10' L6 10'   Ther Activity          Step ups                              Modalities          MHP  CP in supine post TE 10'    Post TE 10' supine bolster under knees

## 2023-11-15 ENCOUNTER — OFFICE VISIT (OUTPATIENT)
Dept: PHYSICAL THERAPY | Facility: CLINIC | Age: 73
End: 2023-11-15
Payer: MEDICARE

## 2023-11-15 DIAGNOSIS — Z98.1 S/P LUMBAR FUSION: ICD-10-CM

## 2023-11-15 DIAGNOSIS — M48.061 SPINAL STENOSIS OF LUMBAR REGION, UNSPECIFIED WHETHER NEUROGENIC CLAUDICATION PRESENT: Primary | ICD-10-CM

## 2023-11-15 PROCEDURE — 97112 NEUROMUSCULAR REEDUCATION: CPT

## 2023-11-15 PROCEDURE — 97110 THERAPEUTIC EXERCISES: CPT

## 2023-11-15 NOTE — PROGRESS NOTES
Daily Note     Today's date: 11/15/2023  Patient name: Marjorie Goodwin  : 1950  MRN: [id number]  Referring provider: Omar Oropeza MD  Dx:   Encounter Diagnosis     ICD-10-CM    1. Spinal stenosis of lumbar region, unspecified whether neurogenic claudication present  M48.061       2. S/P lumbar fusion  Z98.1                      Subjective: Patient reports no new concerns since yesterdays session. Objective: See treatment diary below. Did not perform upper trunk TE at [facility] due to L arm stitches. Will resume when able. Assessment: Tolerated treatment well. Patient would benefit from continued PT to improve upper trunk/core strength for lower back stability and function. Has some weakness in Frank hips and obliques noted. No issues t/o session today. Plan: Continue per plan of care. Precautions: Neuropathy  HEP issued to patient.  Diagnosis, prognosis and PT POC discussed with patient       11/6 11/8 11/14 11/15 10/25 10/30 11/1   Manuals          Hip flexor stretch 30"x3 over edge of plinth 30"x3 over edge of plinth 30"x3 over edge of plinth 30"x3 over edge of plinth Supine high table- CM Supine high table Supine high table   STM      CM              Neuro Re-Ed          PPT     5"x20 5"x20    PPT w/ SLR HEP   2# 2x10 2x10 2x10 2x10   PPT + March    2# 2x10      Tandem balance 30"x2 ea 30"x3 ea 30" 2x ea 30"x3 ea 30"x4 ea 30"x4 ea 30"x3 ea   HERLINDA P5 2x10 P5 2x10 NP    P5 2x10   Anti-Rotation P2 2x10ea P2 2x10 NP    P1 10x ea   Side stepping L3 5x //bars L3 5x//  L3 5x //bars L4 5x// bars   L2 5x//   Ther Ex          HS stretch  Standing  30"x4 ea 16" box 30"x4 ea 16" box 30" 4x ea in standig 30"x4 ea  8" Box standing 30"x4 ea 16" Box 30"x4 ea 16" Box 30"X4 ea   Piri stretch and fig 4 30"x4 fig-4 B 30"x4 fig-4 B 30" 4x fig-4 B/L 30"X4 ea 30"x4 ea fig 4  30"x4 ea 30"x4 ea   Iso hip add HEP    5"x20 5"x20 5"x20   Clam HEP    L4 2x10 in HL L4 2x10 in HL L4 2x10 in HL   Bridging HEP    3"x20 3"x20 3"x20   LTR       10"x10 ea   PB truck stretch 3 way 10"x10 ea 10"x10 ea 10" 10x ea F/L/R 10"x10 ea 10"x10 ea 10"x10 ea 10"X10 ea   Machine leg press  S8 P3 2x10 P3 2x10 P4 2x10 P5 3x10      Machine ham curls  S7 P5 2x10 P5 2x10 P5 2x10 P5 3x10      NuStep for hip mobility and ms endurance Bike L6 10' Bike L6 10' Bike L6 x 10 min Bike L6 x 10 min L5 10' min L6 10' L6 10'   Ther Activity          Step ups                              Modalities          MHP  CP in supine post TE 10'    Post TE 10' supine bolster under knees

## 2023-11-20 ENCOUNTER — OFFICE VISIT (OUTPATIENT)
Dept: PHYSICAL THERAPY | Facility: CLINIC | Age: 73
End: 2023-11-20
Payer: MEDICARE

## 2023-11-20 DIAGNOSIS — M48.061 SPINAL STENOSIS OF LUMBAR REGION, UNSPECIFIED WHETHER NEUROGENIC CLAUDICATION PRESENT: Primary | ICD-10-CM

## 2023-11-20 DIAGNOSIS — Z98.1 S/P LUMBAR FUSION: ICD-10-CM

## 2023-11-20 PROCEDURE — 97110 THERAPEUTIC EXERCISES: CPT

## 2023-11-20 PROCEDURE — 97112 NEUROMUSCULAR REEDUCATION: CPT

## 2023-11-20 NOTE — PROGRESS NOTES
Daily Note     Today's date: 2023  Patient name: Luis Bhat  : 1950  MRN: [id number]  Referring provider: Lacho Jackson MD  Dx:   Encounter Diagnosis     ICD-10-CM    1. Spinal stenosis of lumbar region, unspecified whether neurogenic claudication present  M48.061       2. S/P lumbar fusion  Z98.1           Start Time: 845  Stop Time: 943  Total time in clinic (min): 58 minutes    Subjective: Patient reports no low back pain on arrival and no concerns post prior session. Objective: See treatment diary below. Did not perform upper trunk TE at  due to L arm stitches. Will resume when able. Assessment: Tolerated treatment well. Patient would benefit from continued PT to improve upper trunk/core strength for lower back stability and function. Has some weakness in Frank hips and obliques noted. No issues t/o session today. Plan: Continue per plan of care. Precautions: Neuropathy  HEP issued to patient.  Diagnosis, prognosis and PT POC discussed with patient       11/6 11/8 11/14 11/15 11/20  11/1   Manuals          Hip flexor stretch 30"x3 over edge of plinth 30"x3 over edge of plinth 30"x3 over edge of plinth 30"x3 over edge of plinth 30"x3 over edge of plinth  Supine high table   STM                    Neuro Re-Ed          PPT          PPT w/ SLR HEP   2# 2x10 2# 2x10  2x10   PPT + March    2# 2x10 2# 2x10     Tandem balance 30"x2 ea 30"x3 ea 30" 2x ea 30"x3 ea 30"x3 ea  30"x3 ea   HERLINDA P5 2x10 P5 2x10 NP    P5 2x10   Anti-Rotation P2 2x10ea P2 2x10 NP    P1 10x ea   Side stepping L3 5x //bars L3 5x//  L3 5x //bars L4 5x// bars L4 5x// bars  L2 5x//   Ther Ex          HS stretch  Standing  30"x4 ea 16" box 30"x4 ea 16" box 30" 4x ea in standig 30"x4 ea  30"X4 ea  16" Box 30"X4 ea   Piri stretch and fig 4 30"x4 fig-4 B 30"x4 fig-4 B 30" 4x fig-4 B/L 30"X4 ea 30"X4 ea  30"x4 ea   Iso hip add HEP      5"x20   Clam HEP      L4 2x10 in HL   Bridging HEP      3"x20   LTR       10"x10 ea PB truck stretch 3 way 10"x10 ea 10"x10 ea 10" 10x ea F/L/R 10"x10 ea 10"x10 ea  10"X10 ea   Machine leg press  S8 P3 2x10 P3 2x10 P4 2x10 P5 3x10 P5 3x10     Machine ham curls  S7 P5 2x10 P5 2x10 P5 2x10 P5 3x10 P5 3x10     NuStep for hip mobility and ms endurance Bike L6 10' Bike L6 10' Bike L6 x 10 min Bike L6 x 10 min L6 10' min  L6 10'   Ther Activity          Step ups                              Modalities          MHP  CP in supine post TE 10'

## 2023-11-22 ENCOUNTER — OFFICE VISIT (OUTPATIENT)
Dept: PHYSICAL THERAPY | Facility: CLINIC | Age: 73
End: 2023-11-22
Payer: MEDICARE

## 2023-11-22 DIAGNOSIS — M48.061 SPINAL STENOSIS OF LUMBAR REGION, UNSPECIFIED WHETHER NEUROGENIC CLAUDICATION PRESENT: Primary | ICD-10-CM

## 2023-11-22 DIAGNOSIS — Z98.1 S/P LUMBAR FUSION: ICD-10-CM

## 2023-11-22 PROCEDURE — 97112 NEUROMUSCULAR REEDUCATION: CPT

## 2023-11-22 PROCEDURE — 97110 THERAPEUTIC EXERCISES: CPT

## 2023-11-22 NOTE — PROGRESS NOTES
Daily Note     Today's date: 2023  Patient name: Bia Madden  : 1950  MRN: [id number]  Referring provider: Marj Martinez MD  Dx:   No diagnosis found. Subjective: Patient reports no new concerns since yesterdays session. Objective: See treatment diary below. Did not perform upper trunk TE at [facility] due to L arm stitches. Will resume when able. Assessment: Tolerated treatment well. Patient would benefit from continued PT to improve upper trunk/core strength for lower back stability and function. Has some weakness in Frank hips and obliques noted. No issues t/o session today. Plan: Continue per plan of care. Precautions: Neuropathy  HEP issued to patient.  Diagnosis, prognosis and PT POC discussed with patient       11/6 11/8 11/14 11/15 11/22 10/30 11/1   Manuals          Hip flexor stretch 30"x3 over edge of plinth 30"x3 over edge of plinth 30"x3 over edge of plinth 30"x3 over edge of plinth  Supine high table Supine high table   STM      CM              Neuro Re-Ed          PPT     5"x20 5"x20    PPT w/ SLR HEP   2# 2x10 2x10 2x10 2x10   PPT + March    2# 2x10      Tandem balance 30"x2 ea 30"x3 ea 30" 2x ea 30"x3 ea 30"x4 ea 30"x4 ea 30"x3 ea   HERLINDA P5 2x10 P5 2x10 NP    P5 2x10   Anti-Rotation P2 2x10ea P2 2x10 NP    P1 10x ea   Side stepping L3 5x //bars L3 5x//  L3 5x //bars L4 5x// bars   L2 5x//   Ther Ex          HS stretch  Standing  30"x4 ea 16" box 30"x4 ea 16" box 30" 4x ea in standig 30"x4 ea  8" Box standing 30"x4 ea 16" Box 30"x4 ea 16" Box 30"X4 ea   Piri stretch and fig 4 30"x4 fig-4 B 30"x4 fig-4 B 30" 4x fig-4 B/L 30"X4 ea 30"x4 ea fig 4  30"x4 ea 30"x4 ea   Iso hip add HEP    5"x20 5"x20 5"x20   Clam HEP    L4 2x10 in HL L4 2x10 in HL L4 2x10 in HL   Bridging HEP    3"x20 3"x20 3"x20   LTR       10"x10 ea   PB truck stretch 3 way 10"x10 ea 10"x10 ea 10" 10x ea F/L/R 10"x10 ea 10"x10 ea 10"x10 ea 10"X10 ea   Machine leg press  S8 P3 2x10 P3 2x10 P4 2x10 P5 3x10      Machine ham curls  S7 P5 2x10 P5 2x10 P5 2x10 P5 3x10      NuStep for hip mobility and ms endurance Bike L6 10' Bike L6 10' Bike L6 x 10 min Bike L6 x 10 min L5 10' min L6 10' L6 10'   Ther Activity          Step ups                              Modalities          MHP  CP in supine post TE 10'    Post TE 10' supine bolster under knees

## 2023-11-27 ENCOUNTER — OFFICE VISIT (OUTPATIENT)
Dept: PHYSICAL THERAPY | Facility: CLINIC | Age: 73
End: 2023-11-27
Payer: MEDICARE

## 2023-11-27 DIAGNOSIS — M48.061 SPINAL STENOSIS OF LUMBAR REGION, UNSPECIFIED WHETHER NEUROGENIC CLAUDICATION PRESENT: Primary | ICD-10-CM

## 2023-11-27 DIAGNOSIS — Z98.1 S/P LUMBAR FUSION: ICD-10-CM

## 2023-11-27 PROCEDURE — 97110 THERAPEUTIC EXERCISES: CPT | Performed by: PHYSICAL THERAPIST

## 2023-11-27 PROCEDURE — 97112 NEUROMUSCULAR REEDUCATION: CPT | Performed by: PHYSICAL THERAPIST

## 2023-11-27 NOTE — PROGRESS NOTES
Daily Note     Today's date: 2023  Patient name: Luis Bhat  : 1950  MRN: [id number]  Referring provider: Lacho Jackson MD  Dx:   Encounter Diagnosis     ICD-10-CM    1. Spinal stenosis of lumbar region, unspecified whether neurogenic claudication present  M48.061       2. S/P lumbar fusion  Z98.1           Start Time: 0845  Stop Time: 09  Total time in clinic (min): 45 minutes    Subjective: The patient states that his lower back and both knees are sore today. He states that he was decorating the house for the holidays over the past few days. Objective: See treatment diary below      Assessment: Patient noted decreased pain after session. Tolerated treatment well. Patient demonstrated fatigue post treatment      Plan: Continue per plan of care. Precautions: Neuropathy  HEP issued to patient.  Diagnosis, prognosis and PT POC discussed with patient       11/6 11/8 11/14 11/15 11/22 11/27 11/1   Manuals          Hip flexor stretch 30"x3 over edge of plinth 30"x3 over edge of plinth 30"x3 over edge of plinth 30"x3 over edge of plinth  30"x3 over edge of plinth Supine high table   STM                    Neuro Re-Ed          PPT          PPT w/ SLR HEP   2# 2x10 2x10  2x10   PPT + # 2x10      Tandem balance 30"x2 ea 30"x3 ea 30" 2x ea 30"x3 ea 30"x4 ea  30"x3 ea   HERLINDA P5 2x10 P5 2x10 NP   P5 3x10 P5 2x10   Anti-Rotation P2 2x10ea P2 2x10 NP   P2 3x10 P1 10x ea   Side stepping L3 5x //bars L3 5x//  L3 5x //bars L4 5x// bars  L4 5x//bars L2 5x//   Ther Ex          HS stretch  Standing  30"x4 ea 16" box 30"x4 ea 16" box 30" 4x ea in standig 30"x4 ea  8" Box standing 30"x4 ea 16" Box 30"x4 ea 16" Box 30"X4 ea   Piri stretch and fig 4 30"x4 fig-4 B 30"x4 fig-4 B 30" 4x fig-4 B/L 30"X4 ea 30"x4 ea fig 4   30"x4 ea   Iso hip add HEP    5"x20  5"x20   Clam HEP    L4 2x10 in HL  L4 2x10 in HL   Bridging HEP    3"x20  3"x20   LTR       10"x10 ea   PB truck stretch 3 way 10"x10 ea 10"x10 ea 10" 10x ea F/L/R 10"x10 ea 10"x10 ea 10"x10 ea 10"X10 ea   Machine leg press  S8 P3 2x10 P3 2x10 P4 2x10 P5 3x10  P5,6,7(increase each set) 3x10    Machine ham curls  S7 P5 2x10 P5 2x10 P5 2x10 P5 3x10  P5 3x10    Machine knee ext      P5 3x10    NuStep for hip mobility and ms endurance Bike L6 10' Bike L6 10' Bike L6 x 10 min Bike L6 x 10 min L5 10' min Bike L6 10' L6 10'   Ther Activity          Step ups                              Modalities          MHP  CP in supine post TE 10'

## 2023-11-29 ENCOUNTER — OFFICE VISIT (OUTPATIENT)
Dept: PHYSICAL THERAPY | Facility: CLINIC | Age: 73
End: 2023-11-29
Payer: MEDICARE

## 2023-11-29 DIAGNOSIS — Z98.1 S/P LUMBAR FUSION: ICD-10-CM

## 2023-11-29 DIAGNOSIS — M48.061 SPINAL STENOSIS OF LUMBAR REGION, UNSPECIFIED WHETHER NEUROGENIC CLAUDICATION PRESENT: Primary | ICD-10-CM

## 2023-11-29 PROCEDURE — 97112 NEUROMUSCULAR REEDUCATION: CPT

## 2023-11-29 PROCEDURE — 97110 THERAPEUTIC EXERCISES: CPT

## 2023-11-29 NOTE — PROGRESS NOTES
Daily Note     Today's date: 2023  Patient name: Del Venegas  : 1950  MRN: [id number]  Referring provider: Toney Wong MD  Dx:   Encounter Diagnosis     ICD-10-CM    1. Spinal stenosis of lumbar region, unspecified whether neurogenic claudication present  M48.061       2. S/P lumbar fusion  Z98.1                      Subjective: Patient reports that he has stiffness in his lower back before he gets moving. Objective: See treatment diary below. Increased resistance on HS curl and HERLINDA today. Assessment: Tolerated treatment well. Patient would benefit from continued PT to improve upper trunk and core stability to improve lower back pain and stability. He did well with progression and had noted increase strength. Plan: Continue per plan of care. Precautions: Neuropathy  HEP issued to patient.  Diagnosis, prognosis and PT POC discussed with patient       11/6 11/8 11/14 11/15 11/22 11/27 11/29   Manuals          Hip flexor stretch 30"x3 over edge of plinth 30"x3 over edge of plinth 30"x3 over edge of plinth 30"x3 over edge of plinth  30"x3 over edge of plinth Over edge of plinth- CM   STM                    Neuro Re-Ed          PPT          PPT w/ SLR HEP   2# 2x10 2x10  3# 2x10   PPT + March    2# 2x10   3# 2x10   Tandem balance 30"x2 ea 30"x3 ea 30" 2x ea 30"x3 ea 30"x4 ea  30"x4    HERLINDA P5 2x10 P5 2x10 NP   P5 3x10 P6 3x10   Anti-Rotation P2 2x10ea P2 2x10 NP   P2 3x10 P2 3x10   Side stepping L3 5x //bars L3 5x//  L3 5x //bars L4 5x// bars  L4 5x//bars L4 5X// bars   Ther Ex          HS stretch  Standing  30"x4 ea 16" box 30"x4 ea 16" box 30" 4x ea in standig 30"x4 ea  8" Box standing 30"x4 ea 16" Box 30"x4 ea 16" Box 30"x4 ea   Piri stretch and fig 4 30"x4 fig-4 B 30"x4 fig-4 B 30" 4x fig-4 B/L 30"X4 ea 30"x4 ea fig 4      Iso hip add HEP    5"x20     Clam HEP    L4 2x10 in HL     Bridging HEP    3"x20     LTR          PB truck stretch 3 way 10"x10 ea 10"x10 ea 10" 10x ea F/L/R 10"x10 ea 10"x10 ea 10"x10 ea 10"x10 ea   Machine leg press  S8 P3 2x10 P3 2x10 P4 2x10 P5 3x10  P5,6,7(increase each set) 3x10 P7 3x10   Machine ham curls  S7 P5 2x10 P5 2x10 P5 2x10 P5 3x10  P5 3x10 P6 3x10   Machine knee ext      P5 3x10 P5 3x10   NuStep for hip mobility and ms endurance Bike L6 10' Bike L6 10' Bike L6 x 10 min Bike L6 x 10 min L5 10' min Bike L6 10' L6 10'   Ther Activity          Step ups                              Modalities          MHP  CP in supine post TE 10'

## 2023-12-04 ENCOUNTER — OFFICE VISIT (OUTPATIENT)
Dept: PHYSICAL THERAPY | Facility: CLINIC | Age: 73
End: 2023-12-04
Payer: MEDICARE

## 2023-12-04 DIAGNOSIS — M48.061 SPINAL STENOSIS OF LUMBAR REGION, UNSPECIFIED WHETHER NEUROGENIC CLAUDICATION PRESENT: Primary | ICD-10-CM

## 2023-12-04 DIAGNOSIS — Z98.1 S/P LUMBAR FUSION: ICD-10-CM

## 2023-12-04 PROCEDURE — 97112 NEUROMUSCULAR REEDUCATION: CPT | Performed by: PHYSICAL THERAPIST

## 2023-12-04 NOTE — PROGRESS NOTES
Daily Note     Today's date: 2023  Patient name: Yon Cisneros  : 1950  MRN: [id number]  Referring provider: Deepak Gutierrez MD  Dx: No diagnosis found. Subjective: Patient states that he is stiff in the morning, but it does loosen up. Currently he is not having any pain. Objective: See treatment diary below      Assessment: Tolerated treatment well. Patient exhibited good technique with therapeutic exercises and would benefit from continued PT      Plan: Continue per plan of care. Precautions: Neuropathy  HEP issued to patient.  Diagnosis, prognosis and PT POC discussed with patient       12/4 11/8 11/14 11/15 11/22 11/27 11/29   Manuals          Hip flexor stretch 30"x3 over edge of plinth 30"x3 over edge of plinth 30"x3 over edge of plinth 30"x3 over edge of plinth  30"x3 over edge of plinth Over edge of plinth- CM   STM                    Neuro Re-Ed          PPT          PPT w/ SLR 3# 2/10 ea   2# 2x10 2x10  3# 2x10   PPT + March 3# 2/10 ea   2# 2x10   3# 2x10   Tandem balance 30"x4 ea 30"x3 ea 30" 2x ea 30"x3 ea 30"x4 ea  30"x4    HERLINDA P6 3x10 P5 2x10 NP   P5 3x10 P6 3x10   Anti-Rotation P2 3x10ea P2 2x10 NP   P2 3x10 P2 3x10   Side stepping L4 5x //bars L3 5x//  L3 5x //bars L4 5x// bars  L4 5x//bars L4 5X// bars   Ther Ex          HS stretch  Standing  16" Box 30"x4 ea 30"x4 ea 16" box 30" 4x ea in standig 30"x4 ea  8" Box standing 30"x4 ea 16" Box 30"x4 ea 16" Box 30"x4 ea   Piri stretch and fig 4  30"x4 fig-4 B 30" 4x fig-4 B/L 30"X4 ea 30"x4 ea fig 4      Iso hip add     5"x20     Clam     L4 2x10 in HL     Bridging     3"x20     LTR          PB truck stretch 3 way 10"x10 ea 10"x10 ea 10" 10x ea F/L/R 10"x10 ea 10"x10 ea 10"x10 ea 10"x10 ea   Back EXT Machine P9 3/10         Machine leg press  S8 P7 3x10 P3 2x10 P4 2x10 P5 3x10  P5,6,7(increase each set) 3x10 P7 3x10   Machine ham curls  S7 P6 3x10 P5 2x10 P5 2x10 P5 3x10  P5 3x10 P6 3x10   Machine knee ext P5 3/10     P5 3x10 P5 3x10   NuStep for hip mobility and ms endurance Nu Step L6 10' Bike L6 10' Bike L6 x 10 min Bike L6 x 10 min L5 10' min Bike L6 10' L6 10'   Ther Activity          Step ups                              Modalities          MHP  CP in supine post TE 10'

## 2023-12-07 ENCOUNTER — OFFICE VISIT (OUTPATIENT)
Dept: PHYSICAL THERAPY | Facility: CLINIC | Age: 73
End: 2023-12-07
Payer: MEDICARE

## 2023-12-07 DIAGNOSIS — Z98.1 S/P LUMBAR FUSION: ICD-10-CM

## 2023-12-07 DIAGNOSIS — M48.061 SPINAL STENOSIS OF LUMBAR REGION, UNSPECIFIED WHETHER NEUROGENIC CLAUDICATION PRESENT: Primary | ICD-10-CM

## 2023-12-07 PROCEDURE — 97112 NEUROMUSCULAR REEDUCATION: CPT

## 2023-12-07 PROCEDURE — 97110 THERAPEUTIC EXERCISES: CPT

## 2023-12-07 NOTE — PROGRESS NOTES
Daily Note     Today's date: 2023  Patient name: Tricia Traore  : 1950  MRN: [id number]  Referring provider: Jermaine Aburto MD  Dx:   Encounter Diagnosis     ICD-10-CM    1. Spinal stenosis of lumbar region, unspecified whether neurogenic claudication present  M48.061       2. S/P lumbar fusion  Z98.1           Start Time: 0845  Stop Time: 0940  Total time in clinic (min): 55 minutes    Subjective: Patient reports that his back is hurting today. Patient rates pain as 3-4/10. Patient states that it could be from the weather. Objective: See treatment diary below      Assessment: Tolerated treatment well. Patient participated in skilled PT session focused on strengthening, stretching, and ROM. Patient able to complete exercise program with a relief in pain and tightness at end of session. Patient demonstrates improved balance with a NBOS having no advert LOB. Patient would continue to benefit from skilled PT interventions to address strengthening, stretching, and ROM. Patient demonstrated fatigue post treatment and exhibited good technique with therapeutic exercises      Plan: Continue per plan of care. Precautions: Neuropathy  HEP issued to patient.  Diagnosis, prognosis and PT POC discussed with patient       12/4 12/7 11/14 11/15 11/22 11/27 11/29   Manuals          Hip flexor stretch 30"x3 over edge of plinth 30" 3x over edge of plinth 30"x3 over edge of plinth 30"x3 over edge of plinth  30"x3 over edge of plinth Over edge of plinth- CM   STM                    Neuro Re-Ed          PPT          PPT w/ SLR 3# 2/10 ea 3# B/L 2x10 ea  2# 2x10 2x10  3# 2x10   PPT + March 3# 2/10 ea 3# 2x10 ea  2# 2x10   3# 2x10   Tandem balance 30"x4 ea 30" 4x ea 30" 2x ea 30"x3 ea 30"x4 ea  30"x4    HERLINDA P6 3x10 P6   3x10 NP   P5 3x10 P6 3x10   Anti-Rotation P2 3x10ea P2  3x10 ea NP   P2 3x10 P2 3x10   Side stepping L4 5x //bars L4 x 5 laps //bars L3 5x //bars L4 5x// bars  L4 5x//bars L4 5X// bars   Ther Ex          HS stretch  Standing  16" Box 30"x4 ea 16" box 30" 4x ea 30" 4x ea in standig 30"x4 ea  8" Box standing 30"x4 ea 16" Box 30"x4 ea 16" Box 30"x4 ea   Piri stretch and fig 4   30" 4x fig-4 B/L 30"X4 ea 30"x4 ea fig 4      Iso hip add     5"x20     Clam     L4 2x10 in HL     Bridging     3"x20     LTR          PB truck stretch 3 way 10"x10 ea 10" 10x ea 10" 10x ea F/L/R 10"x10 ea 10"x10 ea 10"x10 ea 10"x10 ea   Back EXT Machine P9 3/10 P9  3x10        Machine leg press  S8 P7 3x10 P7 3x10 P4 2x10 P5 3x10  P5,6,7(increase each set) 3x10 P7 3x10   Machine ham curls  S7 P6 3x10 P6   3x10 P5 2x10 P5 3x10  P5 3x10 P6 3x10   Machine knee ext P5 3/10 P5  3x10    P5 3x10 P5 3x10   NuStep for hip mobility and ms endurance Nu Step L6 10' Nustep L6 x 10' Bike L6 x 10 min Bike L6 x 10 min L5 10' min Bike L6 10' L6 10'   Ther Activity          Step ups                              Modalities          MHP

## 2023-12-12 ENCOUNTER — OFFICE VISIT (OUTPATIENT)
Dept: PHYSICAL THERAPY | Facility: CLINIC | Age: 73
End: 2023-12-12
Payer: MEDICARE

## 2023-12-12 DIAGNOSIS — M48.061 SPINAL STENOSIS OF LUMBAR REGION, UNSPECIFIED WHETHER NEUROGENIC CLAUDICATION PRESENT: Primary | ICD-10-CM

## 2023-12-12 DIAGNOSIS — Z98.1 S/P LUMBAR FUSION: ICD-10-CM

## 2023-12-12 PROCEDURE — 97112 NEUROMUSCULAR REEDUCATION: CPT | Performed by: PHYSICAL THERAPIST

## 2023-12-12 PROCEDURE — 97110 THERAPEUTIC EXERCISES: CPT | Performed by: PHYSICAL THERAPIST

## 2023-12-12 NOTE — PROGRESS NOTES
Daily Note     Today's date: 2023  Patient name: Alma Rosa Garcia  : 1950  MRN: [id number]  Referring provider: Zarina David MD  Dx:   Encounter Diagnosis     ICD-10-CM    1. Spinal stenosis of lumbar region, unspecified whether neurogenic claudication present  M48.061       2. S/P lumbar fusion  Z98.1           Start Time: 1015  Stop Time: 1100  Total time in clinic (min): 45 minutes    Subjective: The patient states that his knees are stiff today. He denies significant back pain. Objective: See treatment diary below      Assessment: Mild tightness remains present in B hip flexor/quads. Good tolerance to stretching. Tolerated treatment well. Patient exhibited good technique with therapeutic exercises      Plan: Progress note during next visit. Precautions: Neuropathy  HEP issued to patient.  Diagnosis, prognosis and PT POC discussed with patient       12/4 12/7 12/12 11/15 11/22 11/27 11/29   Manuals          Hip flexor stretch 30"x3 over edge of plinth 30" 3x over edge of plinth 30"x3 over edge of plinth 30"x3 over edge of plinth  30"x3 over edge of plinth Over edge of plinth- CM   STM                    Neuro Re-Ed          PPT          PPT w/ SLR 3# 2/10 ea 3# B/L 2x10 ea 3# 2x10 2# 2x10 2x10  3# 2x10   PPT + March 3# 2/10 ea 3# 2x10 ea 3# 2x10 ea 2# 2x10   3# 2x10   Tandem balance 30"x4 ea 30" 4x ea 30" 2x ea 30"x3 ea 30"x4 ea  30"x4    HERLINDA P6 3x10 P6   3x10 P6 3x10   P5 3x10 P6 3x10   Anti-Rotation P2 3x10ea P2  3x10 ea P2 3x10 ea   P2 3x10 P2 3x10   Side stepping L4 5x //bars L4 x 5 laps //bars L4 5x //bars L4 5x// bars  L4 5x//bars L4 5X// bars   Ther Ex          HS stretch  Standing  16" Box 30"x4 ea 16" box 30" 4x ea 30" 4x ea in standig 30"x4 ea  8" Box standing 30"x4 ea 16" Box 30"x4 ea 16" Box 30"x4 ea   Piri stretch and fig 4    30"X4 ea 30"x4 ea fig 4      Iso hip add     5"x20     Clam     L4 2x10 in HL     Bridging     3"x20     LTR          PB truck stretch 3 way 10"x10 ea 10" 10x ea 10" 10x ea F/L/R 10"x10 ea 10"x10 ea 10"x10 ea 10"x10 ea   Back EXT Machine P9 3/10 P9  3x10 P9 3x10       Machine leg press  S8 P7 3x10 P7 3x10 P7 3x10 P5 3x10  P5,6,7(increase each set) 3x10 P7 3x10   Machine ham curls  S7 P6 3x10 P6   3x10 P6 3x10 P5 3x10  P5 3x10 P6 3x10   Machine knee ext P5 3/10 P5  3x10 P5 3x10   P5 3x10 P5 3x10   NuStep for hip mobility and ms endurance Nu Step L6 10' Nustep L6 x 10' NuStep L6 x 10 min Bike L6 x 10 min L5 10' min Bike L6 10' L6 10'   Ther Activity          Step ups                              Modalities          MHP

## 2023-12-14 ENCOUNTER — EVALUATION (OUTPATIENT)
Dept: PHYSICAL THERAPY | Facility: CLINIC | Age: 73
End: 2023-12-14
Payer: MEDICARE

## 2023-12-14 DIAGNOSIS — Z98.1 S/P LUMBAR FUSION: ICD-10-CM

## 2023-12-14 DIAGNOSIS — M48.061 SPINAL STENOSIS OF LUMBAR REGION, UNSPECIFIED WHETHER NEUROGENIC CLAUDICATION PRESENT: Primary | ICD-10-CM

## 2023-12-14 PROCEDURE — 97112 NEUROMUSCULAR REEDUCATION: CPT | Performed by: PHYSICAL THERAPIST

## 2023-12-14 PROCEDURE — 97110 THERAPEUTIC EXERCISES: CPT | Performed by: PHYSICAL THERAPIST

## 2023-12-14 NOTE — LETTER
2023    Marj Martinez MD  [address]  [address] [place] [address]    Patient: Bia Madden   YOB: 1950   Date of Visit: 2023     Encounter Diagnosis     ICD-10-CM    1. Spinal stenosis of lumbar region, unspecified whether neurogenic claudication present  M48.061       2. S/P lumbar fusion  Z98.1           Dear Dr. CARRERA [facility]:    Thank you for your recent referral of Bia Madden. Please review the attached evaluation summary from Eugene's recent visit. Please verify that you agree with the plan of care by signing the attached order. If you have any questions or concerns, please do not hesitate to call. I sincerely appreciate the opportunity to share in the care of one of your patients and hope to have another opportunity to work with you in the near future. Sincerely,    Rachel Saldivar, PT      Referring Provider:      I certify that I have read the below Plan of Care and certify the need for these services furnished under this plan of treatment while under my care. Marj Martinez MD  [address]  [place] [address]  Via Fax: [phone]          PT Re-Evaluation  and PT Discharge    Today's date: 2023  Patient name: Bia Madden  : 1950  MRN: [id number]  Referring provider: Marj Martinez MD  Dx:   Encounter Diagnosis     ICD-10-CM    1. Spinal stenosis of lumbar region, unspecified whether neurogenic claudication present  M48.061       2. S/P lumbar fusion  Z98.1             Start Time: 0845  Stop Time: 0930  Total time in clinic (min): 45 minutes    Assessment  Assessment details: The patient has responded well to physical therapy treatment with gains noted in lumbar mobility and strength. He notes a significant improvement in lumbar pain. He is independent with his HEP and feels comfortable maintaining gains achieved in therapy.      Goals  STG(4 weeks):            1. Independent with HEP  MET 2. Decrease pain to 3/10 at worst with functional activities  MET            3. Increase AROM L/S to [facility]  MET            4. Increase strength by 1/2 MMT grade  MET     LTG(8 weeks):              1. Eliminate pain with functional activities  MET             2. Increase LE strength to 4+ to 5/5 PARTIALLY MET             3. Improve LE flexibility to good MET             4. Return to PLOF/golf   MET    Plan  Plan details: D/C PT today due to all goals met  Plan of Care beginning date: 12/14/2023  Plan of Care expiration date: 12/14/2023  Treatment plan discussed with: patient        Subjective Evaluation    History of Present Illness  Mechanism of injury: The patient reports a long history of lower back pain. He underwent a laminectomy(2000) and later another surgery to remove a cyst. Several years later, he later had L4-5 fusion(2002). The pain returned after a few years. He saw another surgeon in 2008 and the fusion was updated. He recently experienced a sharp pain in his back and he returned to his surgeon. Further degeneration was indicated on imaging. He is experiencing burning, numbness and tingling of both feet. He is referred to OPPT at this time. He is seeing pain management on 10-26-23 to discuss an epidural.    UPDATE 11/14/23: The patient currently denies lower back pain. He continues to have numbness and tingling in both feet. He received facet injections 2 weeks ago which helped with his pain. UPDATE 12/14/23: The patient states that his back has been feeling pretty good. He continues to have numbness and tingling in both feet.    Patient Goals  Patient goals for therapy: decreased pain, return to sport/leisure activities, increased motion and increased strength          Objective     Active Range of Motion     Lumbar   Flexion:  WFL  Extension:  with pain Restriction level: minimal  Left lateral flexion:  Restriction level: minimal  Right lateral flexion:  Restriction level: minimal  Left rotation: WFL  Right rotation:  [facility]    Strength/Myotome Testing     Left Hip   Planes of Motion   Flexion: 5  Extension: 4  Abduction: 4+    Right Hip   Planes of Motion   Flexion: 5  Extension: 4  Abduction: 4+    Left Knee   Flexion: 4+  Extension: 5    Right Knee   Flexion: 4+  Extension: 5    Left Ankle/Foot   Dorsiflexion: 5  Plantar flexion: 5    Right Ankle/Foot   Dorsiflexion: 4+  Plantar flexion: 4    Tests     Lumbar     Left   Negative crossed SLR and passive SLR. Right   Negative crossed SLR and passive SLR. Additional Tests Details  Flexibility: HS Good B                   Piriformis Good B                   Hip flexors Fair B             Precautions: Neuropathy  HEP issued to patient.  Diagnosis, prognosis and PT POC discussed with patient       12/4 12/7 12/12 12/14 11/22 11/27 11/29   Manuals          Hip flexor stretch 30"x3 over edge of plinth 30" 3x over edge of plinth 30"x3 over edge of plinth 30"x3 over edge of plinth  30"x3 over edge of plinth Over edge of plinth- CM   STM                    Neuro Re-Ed          PPT          PPT w/ SLR 3# 2/10 ea 3# B/L 2x10 ea 3# 2x10 3# 2x10 2x10  3# 2x10   PPT + March 3# 2/10 ea 3# 2x10 ea 3# 2x10 ea 3# 2x10   3# 2x10   Tandem balance 30"x4 ea 30" 4x ea 30" 2x ea 30"x2 ea 30"x4 ea  30"x4    HERLINDA P6 3x10 P6   3x10 P6 3x10 P6 3x10  P5 3x10 P6 3x10   Anti-Rotation P2 3x10ea P2  3x10 ea P2 3x10 ea P2 3x10 ea  P2 3x10 P2 3x10   Side stepping L4 5x //bars L4 x 5 laps //bars L4 5x //bars L4 5x// bars  L4 5x//bars L4 5X// bars   Ther Ex          HS stretch  Standing  16" Box 30"x4 ea 16" box 30" 4x ea 30" 4x ea in standig 30"x4 ea 16" box 8" Box standing 30"x4 ea 16" Box 30"x4 ea 16" Box 30"x4 ea   Piri stretch and fig 4     30"x4 ea fig 4      Iso hip add     5"x20     Clam     L4 2x10 in HL     Bridging     3"x20     LTR          PB truck stretch 3 way 10"x10 ea 10" 10x ea 10" 10x ea F/L/R 10"x10 ea 10"x10 ea 10"x10 ea 10"x10 ea   Back EXT Machine P9 3/10 P9  3x10 P9 3x10 P9 3x10      Machine leg press  S8 P7 3x10 P7 3x10 P7 3x10 P7 3x10  P5,6,7(increase each set) 3x10 P7 3x10   Machine ham curls  S7 P6 3x10 P6   3x10 P6 3x10 P6 3x10  P5 3x10 P6 3x10   Machine knee ext P5 3/10 P5  3x10 P5 3x10 P5 3x10  P5 3x10 P5 3x10   NuStep for hip mobility and ms endurance Nu Step L6 10' Nustep L6 x 10' NuStep L6 x 10 min Bike L6 x 10 min L5 10' min Bike L6 10' L6 10'   Ther Activity          Step ups                              Modalities          MHP

## 2023-12-14 NOTE — PROGRESS NOTES
PT Re-Evaluation  and PT Discharge    Today's date: 2023  Patient name: Lisa Monzon  : 1950  MRN: [id number]  Referring provider: Cristin Fuentes MD  Dx:   Encounter Diagnosis     ICD-10-CM    1. Spinal stenosis of lumbar region, unspecified whether neurogenic claudication present  M48.061       2. S/P lumbar fusion  Z98.1             Start Time: 0845  Stop Time: 930  Total time in clinic (min): 45 minutes    Assessment  Assessment details: The patient has responded well to physical therapy treatment with gains noted in lumbar mobility and strength. He notes a significant improvement in lumbar pain. He is independent with his HEP and feels comfortable maintaining gains achieved in therapy. Goals  STG(4 weeks):            1. Independent with HEP  MET            2. Decrease pain to 3/10 at worst with functional activities  MET            3. Increase AROM L/S to [facility]  MET            4. Increase strength by 1/2 MMT grade  MET     LTG(8 weeks):              1. Eliminate pain with functional activities  MET             2. Increase LE strength to 4+ to 5/5 PARTIALLY MET             3. Improve LE flexibility to good MET             4. Return to PLOF/golf   MET    Plan  Plan details: D/C PT today due to all goals met  Plan of Care beginning date: 2023  Plan of Care expiration date: 2023  Treatment plan discussed with: patient        Subjective Evaluation    History of Present Illness  Mechanism of injury: The patient reports a long history of lower back pain. He underwent a laminectomy() and later another surgery to remove a cyst. Several years later, he later had L4-5 fusion(). The pain returned after a few years. He saw another surgeon in  and the fusion was updated. He recently experienced a sharp pain in his back and he returned to his surgeon. Further degeneration was indicated on imaging. He is experiencing burning, numbness and tingling of both feet.  He is referred to OPPT at this time. He is seeing pain management on 10-26-23 to discuss an epidural.    UPDATE 11/14/23: The patient currently denies lower back pain. He continues to have numbness and tingling in both feet. He received facet injections 2 weeks ago which helped with his pain. UPDATE 12/14/23: The patient states that his back has been feeling pretty good. He continues to have numbness and tingling in both feet. Patient Goals  Patient goals for therapy: decreased pain, return to sport/leisure activities, increased motion and increased strength          Objective     Active Range of Motion     Lumbar   Flexion:  WFL  Extension:  with pain Restriction level: minimal  Left lateral flexion:  Restriction level: minimal  Right lateral flexion:  Restriction level: minimal  Left rotation:  WFL  Right rotation:  [facility]    Strength/Myotome Testing     Left Hip   Planes of Motion   Flexion: 5  Extension: 4  Abduction: 4+    Right Hip   Planes of Motion   Flexion: 5  Extension: 4  Abduction: 4+    Left Knee   Flexion: 4+  Extension: 5    Right Knee   Flexion: 4+  Extension: 5    Left Ankle/Foot   Dorsiflexion: 5  Plantar flexion: 5    Right Ankle/Foot   Dorsiflexion: 4+  Plantar flexion: 4    Tests     Lumbar     Left   Negative crossed SLR and passive SLR. Right   Negative crossed SLR and passive SLR. Additional Tests Details  Flexibility: HS Good B                   Piriformis Good B                   Hip flexors Fair B             Precautions: Neuropathy  HEP issued to patient.  Diagnosis, prognosis and PT POC discussed with patient       12/4 12/7 12/12 12/14 11/22 11/27 11/29   Manuals          Hip flexor stretch 30"x3 over edge of plinth 30" 3x over edge of plinth 30"x3 over edge of plinth 30"x3 over edge of plinth  30"x3 over edge of plinth Over edge of plinth- CM   STM                    Neuro Re-Ed          PPT          PPT w/ SLR 3# 2/10 ea 3# B/L 2x10 ea 3# 2x10 3# 2x10 2x10  3# 2x10   PPT + March 3# 2/10 ea 3# 2x10 ea 3# 2x10 ea 3# 2x10   3# 2x10   Tandem balance 30"x4 ea 30" 4x ea 30" 2x ea 30"x2 ea 30"x4 ea  30"x4    HERLINDA P6 3x10 P6   3x10 P6 3x10 P6 3x10  P5 3x10 P6 3x10   Anti-Rotation P2 3x10ea P2  3x10 ea P2 3x10 ea P2 3x10 ea  P2 3x10 P2 3x10   Side stepping L4 5x //bars L4 x 5 laps //bars L4 5x //bars L4 5x// bars  L4 5x//bars L4 5X// bars   Ther Ex          HS stretch  Standing  16" Box 30"x4 ea 16" box 30" 4x ea 30" 4x ea in standig 30"x4 ea 16" box 8" Box standing 30"x4 ea 16" Box 30"x4 ea 16" Box 30"x4 ea   Piri stretch and fig 4     30"x4 ea fig 4      Iso hip add     5"x20     Clam     L4 2x10 in HL     Bridging     3"x20     LTR          PB truck stretch 3 way 10"x10 ea 10" 10x ea 10" 10x ea F/L/R 10"x10 ea 10"x10 ea 10"x10 ea 10"x10 ea   Back EXT Machine P9 3/10 P9  3x10 P9 3x10 P9 3x10      Machine leg press  S8 P7 3x10 P7 3x10 P7 3x10 P7 3x10  P5,6,7(increase each set) 3x10 P7 3x10   Machine ham curls  S7 P6 3x10 P6   3x10 P6 3x10 P6 3x10  P5 3x10 P6 3x10   Machine knee ext P5 3/10 P5  3x10 P5 3x10 P5 3x10  P5 3x10 P5 3x10   NuStep for hip mobility and ms endurance Nu Step L6 10' Nustep L6 x 10' NuStep L6 x 10 min Bike L6 x 10 min L5 10' min Bike L6 10' L6 10'   Ther Activity          Step ups                              Modalities          MHP

## 2023-12-19 ENCOUNTER — APPOINTMENT (OUTPATIENT)
Dept: PHYSICAL THERAPY | Facility: CLINIC | Age: 73
End: 2023-12-19
Payer: MEDICARE

## 2023-12-21 ENCOUNTER — APPOINTMENT (OUTPATIENT)
Dept: PHYSICAL THERAPY | Facility: CLINIC | Age: 73
End: 2023-12-21
Payer: MEDICARE

## 2023-12-26 ENCOUNTER — APPOINTMENT (OUTPATIENT)
Dept: PHYSICAL THERAPY | Facility: CLINIC | Age: 73
End: 2023-12-26
Payer: MEDICARE

## 2023-12-28 ENCOUNTER — APPOINTMENT (OUTPATIENT)
Dept: PHYSICAL THERAPY | Facility: CLINIC | Age: 73
End: 2023-12-28
Payer: MEDICARE

## 2024-04-09 RX ORDER — SILDENAFIL 50 MG/1
50 TABLET, FILM COATED ORAL
COMMUNITY
Start: 2024-01-10

## 2024-04-09 RX ORDER — CEPHALEXIN 500 MG/1
CAPSULE ORAL
COMMUNITY

## 2024-04-10 ENCOUNTER — CONSULT (OUTPATIENT)
Dept: GASTROENTEROLOGY | Facility: CLINIC | Age: 74
End: 2024-04-10

## 2024-04-10 VITALS
HEIGHT: 73 IN | DIASTOLIC BLOOD PRESSURE: 96 MMHG | HEART RATE: 79 BPM | WEIGHT: 203 LBS | BODY MASS INDEX: 26.9 KG/M2 | RESPIRATION RATE: 20 BRPM | TEMPERATURE: 97.9 F | SYSTOLIC BLOOD PRESSURE: 172 MMHG

## 2024-04-10 DIAGNOSIS — K21.9 GASTROESOPHAGEAL REFLUX DISEASE, UNSPECIFIED WHETHER ESOPHAGITIS PRESENT: ICD-10-CM

## 2024-04-10 DIAGNOSIS — K22.70 BARRETT'S ESOPHAGUS WITHOUT DYSPLASIA: Primary | ICD-10-CM

## 2024-04-10 DIAGNOSIS — Z12.11 SCREENING FOR COLON CANCER: ICD-10-CM

## 2024-04-10 DIAGNOSIS — I10 HYPERTENSION, UNSPECIFIED TYPE: ICD-10-CM

## 2024-04-10 NOTE — PROGRESS NOTES
[facility] - Outpatient Consultation  Eugene Gallegos 73 y.o. male MRN: [id number]  Encounter: [id number]    ASSESSMENT AND PLAN:      1. 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 Gastroesophageal reflux disease, unspecified whether esophagitis present    This patient has been referred for evaluation of Oquendo's esophagus.  He is not completely clear on his history of such, and I do not have endoscopic reports to review at this time.  We did review the pathophysiology of Oquendo's esophagus, and I am recommending an EGD at this time to evaluate for this mucosal abnormality.  If this is present, I would then recommend daily PPI indefinitely and the appropriate surveillance.    For now, I would recommend he simply continue with as needed usage of the famotidine which he feels treats his symptoms well.  He should continue with diet and lifestyle modifications for GERD as well.    I obtained informed consent from the patient. The risks/benefits/alternatives of the procedure were discussed with the patient. Risks included, but not limited to, infection, bleeding, perforation, injury to organs in the abdomen, missed lesion and incomplete procedure were discussed. Patient was agreeable and electronic signature was obtained.    - EGD; Future    3. Screening for colon cancer    Patient's last colonoscopy was approximately 10 years ago.  He shares that he has no personal history of colon polyps or family history of colon cancer.  He is of average risk.  He is due for a screening colonoscopy now.    Patient is requesting alternate prep from the MiraLAX/Gatorade.  He does not feel he could tolerate GoLytely.  He is requesting a pill form, given normal renal function, I think this is reasonable.    - Colonoscopy; Future  - Sodium Sulfate-Mag Sulfate-KCl 6514-748-522 MG TABS; Take 1 Box by mouth 1 (one) time for 1 dose  Dispense: 24 tablet; Refill: 0    4. Hypertension, unspecified type    Patient was  noted to be hypertensive during office visit.  He is asymptomatic.  He should review with his PCP.    We will follow up after bidirectional endoscopic evaluation.   ______________________________________________________________________    HPI: Patient is a 73 y.o. male who presents today for a consultation regarding Oquendo's esophagus. Pmhx sig for gout, HTN, Oquendo's esophagus, hx of BCC.     Pt is new to clinic.  He shares a history of GERD.  He is having symptoms infrequently at this time for which he uses as needed famotidine.  He estimates symptoms may be occurring once per month or so.  No nausea, emesis, dysphagia or odynophagia.  No early satiety.  He did lose about 7 pounds over the past 6 months, he shares his appetite is not quite what it used to be. Pt previously followed with Dr. Felicita MCCABE.     Pertaining to bowels, patient is having a formed brown stool daily.  He denies any excess straining or chronic diarrhea.  No BRBPR or melena.  No nocturnal BMs. No abd pain or rectal pain related to defecation. No family hx of CRC.     NSAIDs: acetaminophen and ibuprofen PRN   Etoh: occassional   Tobacco: none     02/2024: Hb 17.2, MCV 93.5, platelets 218, BUN 11, creatinine 1.0, AST 22, ALT 23, ALP 66, albumin 4.4, T. bili 0.6    Endoscopic history:   EGD: unknown   Colon: 10/2014    Review of Systems   Constitutional:  Negative for fatigue, fever and unexpected weight change.   HENT:  Negative for mouth sores and sore throat.    Respiratory:  Negative for shortness of breath.    Cardiovascular:  Negative for chest pain.   Gastrointestinal:  Positive for abdominal pain. Negative for constipation, diarrhea, nausea and vomiting.   Endocrine: Negative.    Genitourinary:  Positive for frequency.   Musculoskeletal:  Positive for arthralgias.   Skin:  Negative for color change and rash.   Allergic/Immunologic: Negative.    Hematological: Negative.    Otherwise Per HPI    Historical Information   Past Medical History:    Diagnosis Date    Arthritis     Basal cell carcinoma in situ of skin     on back    Disease of thyroid gland     hypothyroid    GERD (gastroesophageal reflux disease)     Gout     Gout     Hypertension     Neuropathy     right foot    Spinal stenosis, lumbar     Wears dentures     upper full    Wears hearing aid     both ears     Past Surgical History:   Procedure Laterality Date    BACK SURGERY      spinal fusion    CATARACT EXTRACTION Bilateral     COLONOSCOPY      EYE SURGERY Bilateral     retinal dtetachment    JOINT REPLACEMENT      bilateral knee replacement    SC ARTHRP KNE CONDYLE&PLATU MEDIAL&LAT COMPARTMENTS Left 11/6/2018    Procedure: ARTHROPLASTY KNEE TOTAL;  Surgeon: Ean Almeida MD;  Location: [facility] OR;  Service: Orthopedics    THYROIDECTOMY, PARTIAL      WRIST SURGERY       Social History   Social History     Substance and Sexual Activity   Alcohol Use Yes    Comment: occasional     Social History     Substance and Sexual Activity   Drug Use No     Social History     Tobacco Use   Smoking Status Never   Smokeless Tobacco Never     History reviewed. No pertinent family history.    Meds/Allergies       Current Outpatient Medications:     allopurinol (ZYLOPRIM) 100 mg tablet    cephalexin (KEFLEX) 500 mg capsule    colchicine (COLCRYS) 0.6 mg tablet    cyclobenzaprine (FLEXERIL) 10 mg tablet    famotidine (PEPCID) 40 MG tablet    Levothyroxine Sodium 25 MCG CAPS    lisinopril (ZESTRIL) 10 mg tablet    metFORMIN (GLUCOPHAGE-XR) 500 mg 24 hr tablet    sildenafil (VIAGRA) 50 MG tablet    Sodium Sulfate-Mag Sulfate-KCl 2422-645-562 MG TABS    Allergies   Allergen Reactions    Aspirin Hives     palpitations    Diclofenac Sodium Hives     Pt is allergic to oral NSAIDS however is using local Voltaren Gel without issue.   He states that he had itchiness from Voltaren pills.   Pt is allergic to oral NSAIDS however is using local Voltaren Gel without issue.   He states that he had itchiness from Voltaren  "pills.       Diclofenac Sodium Hives     Pt is allergic to oral NSAIDS however is using local Voltaren Gel without issue.   He states that he had itchiness from Voltaren pills.    Pt is allergic to oral NSAIDS however is using local Voltaren Gel without issue.   He states that he had itchiness from Voltaren pills.     Objective     Blood pressure (!) 172/96, pulse 79, temperature 97.9 °F (36.6 °C), temperature source Tympanic, resp. rate 20, height 6' 1\" (1.854 m), weight 92.1 kg (203 lb). Body mass index is 26.78 kg/m².    Physical Exam  Vitals and nursing note reviewed.   Constitutional:       General: He is not in acute distress.     Appearance: He is well-developed.   HENT:      Head: Normocephalic and atraumatic.   Eyes:      Conjunctiva/sclera: Conjunctivae normal.   Cardiovascular:      Rate and Rhythm: Normal rate.   Pulmonary:      Effort: Pulmonary effort is normal. No respiratory distress.   Abdominal:      General: Bowel sounds are normal. There is no distension.      Palpations: Abdomen is soft.      Tenderness: There is no abdominal tenderness. There is no guarding or rebound.   Musculoskeletal:         General: No swelling.   Skin:     General: Skin is warm and dry.      Coloration: Skin is not jaundiced.   Neurological:      General: No focal deficit present.      Mental Status: He is alert and oriented to person, place, and time.   Psychiatric:         Mood and Affect: Mood normal.         Behavior: Behavior normal.        Lab Results:   No visits with results within 1 Day(s) from this visit.   Latest known visit with results is:   Admission on 11/06/2018, Discharged on 11/08/2018   Component Date Value    ABO Grouping 11/06/2018 B     Rh Factor 11/06/2018 Positive     Antibody Screen 11/06/2018 Negative     Specimen Expiration Date 11/06/2018 [id number]     Sodium 11/07/2018 135 (L)     Potassium 11/07/2018 4.2     Chloride 11/07/2018 102     CO2 11/07/2018 27     ANION GAP 11/07/2018 6     BUN " 11/07/2018 10     Creatinine 11/07/2018 1.02     Glucose 11/07/2018 172 (H)     Calcium 11/07/2018 8.2 (L)     eGFR 11/07/2018 75     WBC 11/07/2018 15.15 (H)     RBC 11/07/2018 4.34     Hemoglobin 11/07/2018 13.5     Hematocrit 11/07/2018 39.0     MCV 11/07/2018 90     MCH 11/07/2018 31.1     MCHC 11/07/2018 34.6     RDW 11/07/2018 12.2     Platelets 11/07/2018 180     MPV 11/07/2018 10.6     Sodium 11/08/2018 138     Potassium 11/08/2018 3.5     Chloride 11/08/2018 103     CO2 11/08/2018 27     ANION GAP 11/08/2018 8     BUN 11/08/2018 8     Creatinine 11/08/2018 0.89     Glucose 11/08/2018 142 (H)     Calcium 11/08/2018 8.6     eGFR 11/08/2018 87        Radiology Results:   No results found.    Klaudia Browning PA-C    **Please note:  Dictation voice to text software may have been used in the creation of this record.  Occasional wrong word or “sound alike” substitutions may have occurred due to the inherent limitations of voice recognition software.  Read the chart carefully and recognize, using context, where substitutions have occurred.**

## 2024-04-10 NOTE — PATIENT INSTRUCTIONS
You are due for colonoscopy for colon cancer screening purposes as it has been 10 years.  Continue on a high-fiber diet and with excellent hydration.    Per your chart review, there is some question or concern that you may have a diagnosis of Oquendo's esophagus.  This is a change to the cells of the bottom of the esophagus from damage from acid and other irritants.  We will repeat an EGD now to determine if this diagnosis is true.  If this is the case, we would make some adjustments to your medications and monitoring recommendations.

## 2024-06-06 ENCOUNTER — ANESTHESIA (OUTPATIENT)
Dept: ANESTHESIOLOGY | Facility: HOSPITAL | Age: 74
End: 2024-06-06

## 2024-06-06 ENCOUNTER — ANESTHESIA EVENT (OUTPATIENT)
Dept: ANESTHESIOLOGY | Facility: HOSPITAL | Age: 74
End: 2024-06-06

## 2024-06-06 NOTE — ANESTHESIA PREPROCEDURE EVALUATION
Procedure:  PRE-OP ONLY    Relevant Problems   MUSCULOSKELETAL   (+) Localized osteoarthritis of left knee        Physical Exam    Airway    Mallampati score: II  TM Distance: >3 FB  Neck ROM: full     Dental   No notable dental hx     Cardiovascular  Cardiovascular exam normal    Pulmonary  Pulmonary exam normal     Other Findings  HTN    GERD    Hypothyroid        Anesthesia Plan  ASA Score- 3     Anesthesia Type-     Plan Factors-    Induction-     Postoperative Plan-         Informed Consent-

## 2024-06-07 ENCOUNTER — HOSPITAL ENCOUNTER (OUTPATIENT)
Dept: PERIOP | Facility: HOSPITAL | Age: 74
Setting detail: OUTPATIENT SURGERY
End: 2024-06-07
Payer: MEDICARE

## 2024-06-07 ENCOUNTER — ANESTHESIA EVENT (OUTPATIENT)
Dept: PERIOP | Facility: HOSPITAL | Age: 74
End: 2024-06-07

## 2024-06-07 ENCOUNTER — ANESTHESIA (OUTPATIENT)
Dept: PERIOP | Facility: HOSPITAL | Age: 74
End: 2024-06-07

## 2024-06-07 VITALS
HEIGHT: 73 IN | TEMPERATURE: 98.1 F | WEIGHT: 204 LBS | RESPIRATION RATE: 20 BRPM | BODY MASS INDEX: 27.04 KG/M2 | SYSTOLIC BLOOD PRESSURE: 167 MMHG | HEART RATE: 67 BPM | OXYGEN SATURATION: 98 % | DIASTOLIC BLOOD PRESSURE: 74 MMHG

## 2024-06-07 DIAGNOSIS — Z12.11 SCREENING FOR COLON CANCER: ICD-10-CM

## 2024-06-07 DIAGNOSIS — K22.70 BARRETT'S ESOPHAGUS WITHOUT DYSPLASIA: ICD-10-CM

## 2024-06-07 LAB — GLUCOSE SERPL-MCNC: 111 MG/DL (ref 65–140)

## 2024-06-07 PROCEDURE — 88342 IMHCHEM/IMCYTCHM 1ST ANTB: CPT | Performed by: PATHOLOGY

## 2024-06-07 PROCEDURE — 43239 EGD BIOPSY SINGLE/MULTIPLE: CPT | Performed by: INTERNAL MEDICINE

## 2024-06-07 PROCEDURE — 82948 REAGENT STRIP/BLOOD GLUCOSE: CPT

## 2024-06-07 PROCEDURE — 88305 TISSUE EXAM BY PATHOLOGIST: CPT | Performed by: PATHOLOGY

## 2024-06-07 PROCEDURE — 45385 COLONOSCOPY W/LESION REMOVAL: CPT | Performed by: INTERNAL MEDICINE

## 2024-06-07 RX ORDER — SODIUM CHLORIDE, SODIUM LACTATE, POTASSIUM CHLORIDE, CALCIUM CHLORIDE 600; 310; 30; 20 MG/100ML; MG/100ML; MG/100ML; MG/100ML
125 INJECTION, SOLUTION INTRAVENOUS CONTINUOUS
Status: DISPENSED | OUTPATIENT
Start: 2024-06-07

## 2024-06-07 RX ORDER — LIDOCAINE HYDROCHLORIDE 20 MG/ML
INJECTION, SOLUTION EPIDURAL; INFILTRATION; INTRACAUDAL; PERINEURAL AS NEEDED
Status: DISCONTINUED | OUTPATIENT
Start: 2024-06-07 | End: 2024-06-07

## 2024-06-07 RX ORDER — PROPOFOL 10 MG/ML
INJECTION, EMULSION INTRAVENOUS AS NEEDED
Status: DISCONTINUED | OUTPATIENT
Start: 2024-06-07 | End: 2024-06-07

## 2024-06-07 RX ORDER — SODIUM CHLORIDE, SODIUM LACTATE, POTASSIUM CHLORIDE, CALCIUM CHLORIDE 600; 310; 30; 20 MG/100ML; MG/100ML; MG/100ML; MG/100ML
INJECTION, SOLUTION INTRAVENOUS CONTINUOUS PRN
Status: DISCONTINUED | OUTPATIENT
Start: 2024-06-07 | End: 2024-06-07

## 2024-06-07 RX ADMIN — PROPOFOL 120 MCG/KG/MIN: 10 INJECTION, EMULSION INTRAVENOUS at 08:53

## 2024-06-07 RX ADMIN — SODIUM CHLORIDE, SODIUM LACTATE, POTASSIUM CHLORIDE, AND CALCIUM CHLORIDE: .6; .31; .03; .02 INJECTION, SOLUTION INTRAVENOUS at 08:37

## 2024-06-07 RX ADMIN — PROPOFOL 150 MG: 10 INJECTION, EMULSION INTRAVENOUS at 08:42

## 2024-06-07 RX ADMIN — LIDOCAINE HYDROCHLORIDE 100 MG: 20 INJECTION, SOLUTION EPIDURAL; INFILTRATION; INTRACAUDAL; PERINEURAL at 08:42

## 2024-06-07 RX ADMIN — SODIUM CHLORIDE, SODIUM LACTATE, POTASSIUM CHLORIDE, AND CALCIUM CHLORIDE 125 ML/HR: .6; .31; .03; .02 INJECTION, SOLUTION INTRAVENOUS at 07:53

## 2024-06-07 NOTE — H&P
H&P EXAM - Outpatient Endoscopy   Eugene Glalegos 73 y.o. male MRN: [id number]    [facility]   Encounter: [id number]      History and Physical - [facility]  Eugene Gallegos 73 y.o. male MRN: [id number]                  HPI: Eugene Gallegos is a 73 y.o. year old male who presents for gerd, ron's esophagus, colon cancer screening      REVIEW OF SYSTEMS: Per the HPI, and otherwise unremarkable.    Historical Information   Past Medical History:   Diagnosis Date    Arthritis     Basal cell carcinoma in situ of skin     on back    Disease of thyroid gland     hypothyroid    GERD (gastroesophageal reflux disease)     Gout     Gout     Hypertension     Neuropathy     right foot    Spinal stenosis, lumbar     Wears dentures     upper full    Wears hearing aid     both ears     Past Surgical History:   Procedure Laterality Date    BACK SURGERY      spinal fusion    CATARACT EXTRACTION Bilateral     COLONOSCOPY      EYE SURGERY Bilateral     retinal dtetachment    JOINT REPLACEMENT      bilateral knee replacement    [place] ARTHRP KNE CONDYLE&PLATU MEDIAL&LAT COMPARTMENTS Left 11/6/2018    Procedure: ARTHROPLASTY KNEE TOTAL;  Surgeon: Ean Almeida MD;  Location: [facility] OR;  Service: Orthopedics    THYROIDECTOMY, PARTIAL      WRIST SURGERY       Social History   Social History     Substance and Sexual Activity   Alcohol Use Yes    Comment: occasional     Social History     Substance and Sexual Activity   Drug Use No     Social History     Tobacco Use   Smoking Status Never   Smokeless Tobacco Never     History reviewed. No pertinent family history.    Meds/Allergies     Not in a hospital admission.    Allergies   Allergen Reactions    Aspirin Hives     palpitations    Diclofenac Sodium Hives     Pt is allergic to oral NSAIDS however is using local Voltaren Gel without issue.   He states that he had itchiness from Voltaren pills.   Pt is allergic to oral NSAIDS however is using  "local Voltaren Gel without issue.   He states that he had itchiness from Voltaren pills.       Diclofenac Sodium Hives     Pt is allergic to oral NSAIDS however is using local Voltaren Gel without issue.   He states that he had itchiness from Voltaren pills.    Pt is allergic to oral NSAIDS however is using local Voltaren Gel without issue.   He states that he had itchiness from Voltaren pills.       Objective     BP (!) 182/87   Pulse 74   Temp 98.1 °F (36.7 °C) (Temporal)   Resp 18   Ht 6' 1\" (1.854 m)   Wt 92.5 kg (204 lb)   SpO2 97%   BMI 26.91 kg/m²       PHYSICAL EXAM    Gen: NAD  CV: RRR  CHEST: Clear  ABD: soft, NT/ND  EXT: no edema      ASSESSMENT/PLAN:  This is a 73 y.o. year old male here for egd/colonoscopy, and he is stable and optimized for his procedure.            "

## 2024-06-07 NOTE — ANESTHESIA POSTPROCEDURE EVALUATION
Post-Op Assessment Note    CV Status:  Stable  Pain Score: 0    Pain management: adequate       Mental Status:  Sleepy   Hydration Status:  Stable   PONV Controlled:  None   Airway Patency:  Patent  Two or more mitigation strategies used for obstructive sleep apnea   Post Op Vitals Reviewed: Yes    No anethesia notable event occurred.    Staff: Anesthesiologist               /86 (06/07/24 0938)    Temp 98.2 °F (36.8 °C) (06/07/24 0938)    Pulse 68 (06/07/24 0938)   Resp 20 (06/07/24 0938)    SpO2 96 % (06/07/24 0938)

## 2024-06-12 PROCEDURE — 88305 TISSUE EXAM BY PATHOLOGIST: CPT | Performed by: PATHOLOGY

## 2024-06-12 PROCEDURE — 88342 IMHCHEM/IMCYTCHM 1ST ANTB: CPT | Performed by: PATHOLOGY

## 2024-06-12 NOTE — RESULT ENCOUNTER NOTE
Results relayed via Mychart  Hyperplastic versus serrated adenomatous polyp in the sigmoid colon otherwise normal pathology.  No additional colonoscopy is needed for screening purposes given age.

## 2024-08-19 ENCOUNTER — TELEPHONE (OUTPATIENT)
Dept: GASTROENTEROLOGY | Facility: CLINIC | Age: 74
End: 2024-08-19

## 2024-12-16 ENCOUNTER — OFFICE VISIT (OUTPATIENT)
Dept: GASTROENTEROLOGY | Facility: CLINIC | Age: 74
End: 2024-12-16
Payer: MEDICARE

## 2024-12-16 VITALS
BODY MASS INDEX: 25.82 KG/M2 | WEIGHT: 194.8 LBS | HEART RATE: 102 BPM | DIASTOLIC BLOOD PRESSURE: 84 MMHG | HEIGHT: 73 IN | TEMPERATURE: 98.2 F | SYSTOLIC BLOOD PRESSURE: 137 MMHG | OXYGEN SATURATION: 96 %

## 2024-12-16 DIAGNOSIS — K21.9 GASTROESOPHAGEAL REFLUX DISEASE WITHOUT ESOPHAGITIS: Primary | ICD-10-CM

## 2024-12-16 DIAGNOSIS — Z86.0100 PERSONAL HISTORY OF COLON POLYPS, UNSPECIFIED: ICD-10-CM

## 2024-12-16 DIAGNOSIS — K22.70 BARRETT'S ESOPHAGUS WITHOUT DYSPLASIA: ICD-10-CM

## 2024-12-16 PROCEDURE — 99213 OFFICE O/P EST LOW 20 MIN: CPT | Performed by: PHYSICIAN ASSISTANT

## 2024-12-16 RX ORDER — LEVOTHYROXINE SODIUM 25 UG/1
TABLET ORAL
COMMUNITY
Start: 2024-09-10

## 2024-12-16 NOTE — PROGRESS NOTES
[facility] - Outpatient Follow-up Note  Eugene Gallegos 74 y.o. male MRN: [id number]  Encounter: [id number]    ASSESSMENT AND PLAN:      1. Gastroesophageal reflux disease without esophagitis (Primary)    Patient does have a history of intermittent reflux symptoms for which he takes famotidine as needed.  His EGD from 06/2024 demonstrated 1 cm hiatal hernia, regular z-line, gastritis and multiple fundic gland polyps.   Pathology negative for h pylori or intestinal metaplasia.   Recommend small frequent meals and diet and lifestyle modifications for GERD.  Okay to continue with as needed usage of H2 RA for now.    2. 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   We have no pathology available in the pt's chart that  corroborates this diagnosis.  Therefore, there is no evidence that the patient has Oquendo's esophagus and he does not require any additional surveillance EGD at this time.  This type of diagnosis will be removed from his problem list.     3. Personal history of colon polyps, unspecified    Pt had hyperplastic versus sessile serrated adenomatous polyp in the sigmoid, otherwise normal pathology.  Per endoscopist, there is no need for additional colonoscopies for screening/surveillance purposes given patient's age.    We will follow up as needed for development of new GI symptoms.   ______________________________________________________________________    SUBJECTIVE: Patient is a 74 y.o. male who presents today for follow-up regarding EGD/colon. Pmhx sig for gout, HTN, Oquendo's esophagus, hx of BCC.     Patient was last evaluated in 04/2024.  At that time he was referred for evaluation of Oquendo's esophagus.  He underwent an EGD in 06/2024 which did not demonstrate any evidence of Oquendo's esophagus.  He also was due for a colonoscopy for cancer screening purposes and underwent this which indicated 1  sessile serrated polyp.    12/16/2024:    This patient is feeling well overall.  He notes infrequent heartburn, for which he takes as needed famotidine.  Last took a couple of weeks ago.  He denies any nausea, emesis, dysphagia or odynophagia.  No early satiety.  No abnormal weight loss over the past 6 months.    Pertaining to bowels, patient is having formed brown stool output daily.  No BRBPR or melena.  No abdominal pain or rectal pain related to defecation.  No nocturnal BMs.    NSAIDs: acetaminophen and ibuprofen PRN   Etoh: occassional   Tobacco: none      02/2024: Hb 17.2, MCV 93.5, platelets 218, BUN 11, creatinine 1.0, AST 22, ALT 23, ALP 66, albumin 4.4, T. bili 0.6    Endoscopic history:   EGD: 06/2024: 1 cm hiatal hernia, multiple fundic gland polyps, Edematous mucosa in the body of the stomach and antrum  A.  Stomach (biopsy): Minimal chronic inactive gastritis with features of reactive gastropathy involving oxyntic and foveolar mucosa. Immunostain with no definitive Helicobacter (control stains appropriately). No intestinal metaplasia, dysplasia or neoplasia identified  Colon: 06/2024: 2 subcentimeter polyps; Diverticulosis of moderate severity in the descending colon; large hemorrhoids    B.  Sigmoid colon polyp (cold snare): Serrated colon polyp, cannot exclude sessile serrated adenoma.  C.  Descending colon polyp (cold snare): Polypoid colonic mucosa with mild surface hyperplasia and lymphoid aggregate    Review of Systems   Otherwise Per HPI    Historical Information   Past Medical History:   Diagnosis Date    Arthritis     Basal cell carcinoma in situ of skin     on back    Disease of thyroid gland     hypothyroid    GERD (gastroesophageal reflux disease)     Gout     Gout     Hypertension     Neuropathy     right foot    Spinal stenosis, lumbar     Wears dentures     upper full    Wears hearing aid     both ears     Past Surgical History:   Procedure Laterality Date    BACK SURGERY      spinal  "fusion    CATARACT EXTRACTION Bilateral     COLONOSCOPY      EYE SURGERY Bilateral     retinal dtetachment    JOINT REPLACEMENT      bilateral knee replacement    VA ARTHRP KNE CONDYLE&PLATU MEDIAL&LAT COMPARTMENTS Left 11/6/2018    Procedure: ARTHROPLASTY KNEE TOTAL;  Surgeon: Ean Almeida MD;  Location: [facility];  Service: Orthopedics    THYROIDECTOMY, PARTIAL      WRIST SURGERY       Social History   Social History     Substance and Sexual Activity   Alcohol Use Yes    Comment: occasional     Social History     Substance and Sexual Activity   Drug Use No     Social History     Tobacco Use   Smoking Status Never   Smokeless Tobacco Never     History reviewed. No pertinent family history.    Meds/Allergies       Current Outpatient Medications:     allopurinol (ZYLOPRIM) 100 mg tablet    cyclobenzaprine (FLEXERIL) 10 mg tablet    famotidine (PEPCID) 40 MG tablet    levothyroxine 25 mcg tablet    Levothyroxine Sodium 25 MCG CAPS    lisinopril (ZESTRIL) 10 mg tablet    metFORMIN (GLUCOPHAGE-XR) 500 mg 24 hr tablet    cephalexin (KEFLEX) 500 mg capsule    colchicine (COLCRYS) 0.6 mg tablet    Allergies   Allergen Reactions    Aspirin Hives     palpitations    Diclofenac Sodium Hives     Pt is allergic to oral NSAIDS however is using local Voltaren Gel without issue.   He states that he had itchiness from Voltaren pills.   Pt is allergic to oral NSAIDS however is using local Voltaren Gel without issue.   He states that he had itchiness from Voltaren pills.       Diclofenac Sodium Hives     Pt is allergic to oral NSAIDS however is using local Voltaren Gel without issue.   He states that he had itchiness from Voltaren pills.    Pt is allergic to oral NSAIDS however is using local Voltaren Gel without issue.   He states that he had itchiness from Voltaren pills.       Objective     Blood pressure 137/84, pulse 102, temperature 98.2 °F (36.8 °C), temperature source Temporal, height 6' 1\" (1.854 m), weight 88.4 kg (194 lb " 12.8 oz), SpO2 96%. Body mass index is 25.7 kg/m².    Physical Exam  Vitals and nursing note reviewed.   Constitutional:       General: He is not in acute distress.     Appearance: He is well-developed.   HENT:      Head: Normocephalic and atraumatic.   Eyes:      Conjunctiva/sclera: Conjunctivae normal.   Cardiovascular:      Rate and Rhythm: Normal rate.      Heart sounds: No murmur heard.  Pulmonary:      Effort: Pulmonary effort is normal. No respiratory distress.      Breath sounds: Normal breath sounds.   Abdominal:      Palpations: Abdomen is soft.      Tenderness: There is no abdominal tenderness.   Musculoskeletal:         General: No swelling.   Skin:     General: Skin is warm and dry.      Capillary Refill: Capillary refill takes less than 2 seconds.   Neurological:      Mental Status: He is alert.   Psychiatric:         Mood and Affect: Mood normal.       Lab Results:   No visits with results within 1 Day(s) from this visit.   Latest known visit with results is:   Hospital Outpatient Visit on 06/07/2024   Component Date Value    POC Glucose 06/07/2024 111     Case Report 06/07/2024                      Value:Surgical Pathology Report                         Case: [id number]                                  Authorizing Provider:  Diana M Jaiyeola, MD       Collected:           06/07/2024 0844              Ordering Location:     [facility] Received:            06/07/2024 1316                                     Operating Room                                                               Pathologist:           Rancho Bertrand MD                                                                Specimens:   A) - Stomach, Cold Forcep Bx. R/O H. Pylori                                                         B) - Large Intestine, Sigmoid Colon, Cold Snare of Sigmoid Colon Polyp x1                           C) - Large Intestine, Left/Descending Colon, Cold Snare of Descending Colon Polyp x1       Final  Diagnosis 06/07/2024                      Value:A.  Stomach (biopsy):     - Minimal chronic inactive gastritis with features of reactive gastropathy involving oxyntic and foveolar mucosa.     - Immunostain with no definitive Helicobacter (control stains appropriately).     - No intestinal metaplasia, dysplasia or neoplasia identified.    B.  Sigmoid colon polyp (cold snare):     - Serrated colon polyp, cannot exclude sessile serrated adenoma.    C.  Descending colon polyp (cold snare):     - Polypoid colonic mucosa with mild surface hyperplasia and lymphoid aggregate.      Additional Information 06/07/2024                      Value:All reported additional testing was performed with appropriately reactive controls.  These tests were developed and their performance characteristics determined by [facility] or appropriate performing facility, though some tests may be performed on tissues which have not been validated for performance characteristics (such as staining performed on alcohol exposed cell blocks and decalcified tissues).  Results should be interpreted with caution and in the context of the patients’ clinical condition. These tests may not be cleared or approved by the U.S. Food and Drug Administration, though the FDA has determined that such clearance or approval is not necessary. These tests are used for clinical purposes and they should not be regarded as investigational or for research. This laboratory has been approved by CLIA , designated as a high-complexity laboratory and is qualified to perform these tests. Interpretation performed at [facility], [address] [place] [id number]..      Synoptic Checklist 06/07/2024                      Value:                            COLON/RECTUM POLYP FORM - GI - B, C                                                                                     :    Other      Gross Description 06/07/2024             "          Value:A. The specimen is received in formalin, labeled with the patient's name and hospital number, and is designated \" stomach biopsy\".  The specimen consists of multiple tan-pink soft tissue fragments measuring in aggregate 0.7 x 0.7 x 0.2 cm.  B. The specimen is received in formalin, labeled with the patient's name and hospital number, and is designated \" sigmoid colon polyp x 1\".  The specimen consists of 1 tan-pink to red soft tissue fragment measuring 0.6 x 0.5 x 0.1 cm.  Entirely submitted. One screened cassette.  C. The specimen is received in formalin, labeled with the patient's name and hospital number, and is designated \" descending colon polyp\".  The specimen consists of 1 tan-pink to red soft tissue fragment measuring 1.1 x 0.3 x 0.1 cm.  Entirely submitted. One screened cassette.    Note: The estimated total formalin fixation time based upon information provided by the submitting clinician and the standard processing schedule is over 72 hours.  [place]       Radiology Results:   No results found.    Klaudia Browning PA-C    **Please note:  Dictation voice to text software may have been used in the creation of this record.  Occasional wrong word or “sound alike” substitutions may have occurred due to the inherent limitations of voice recognition software.  Read the chart carefully and recognize, using context, where substitutions have occurred.**  "

## 2024-12-23 ENCOUNTER — APPOINTMENT (EMERGENCY)
Dept: CT IMAGING | Facility: HOSPITAL | Age: 74
End: 2024-12-23
Payer: MEDICARE

## 2024-12-23 ENCOUNTER — HOSPITAL ENCOUNTER (EMERGENCY)
Facility: HOSPITAL | Age: 74
Discharge: HOME | End: 2024-12-23
Attending: EMERGENCY MEDICINE
Payer: MEDICARE

## 2024-12-23 VITALS
RESPIRATION RATE: 16 BRPM | TEMPERATURE: 98 F | SYSTOLIC BLOOD PRESSURE: 135 MMHG | WEIGHT: 197.75 LBS | BODY MASS INDEX: 26.09 KG/M2 | DIASTOLIC BLOOD PRESSURE: 75 MMHG | HEART RATE: 61 BPM | OXYGEN SATURATION: 95 %

## 2024-12-23 DIAGNOSIS — G62.9 NEUROPATHY: Primary | ICD-10-CM

## 2024-12-23 LAB
ALBUMIN SERPL BCG-MCNC: 4 G/DL (ref 3.5–5)
ALP SERPL-CCNC: 49 U/L (ref 34–104)
ALT SERPL W P-5'-P-CCNC: 10 U/L (ref 7–52)
ANION GAP SERPL CALCULATED.3IONS-SCNC: 10 MMOL/L (ref 4–13)
AST SERPL W P-5'-P-CCNC: 12 U/L (ref 13–39)
BASOPHILS # BLD AUTO: 0.04 THOUSANDS/ÂΜL (ref 0–0.1)
BASOPHILS NFR BLD AUTO: 1 % (ref 0–1)
BILIRUB SERPL-MCNC: 0.97 MG/DL (ref 0.2–1)
BUN SERPL-MCNC: 12 MG/DL (ref 5–25)
CALCIUM SERPL-MCNC: 9.2 MG/DL (ref 8.4–10.2)
CHLORIDE SERPL-SCNC: 103 MMOL/L (ref 96–108)
CO2 SERPL-SCNC: 27 MMOL/L (ref 21–32)
CREAT SERPL-MCNC: 1.04 MG/DL (ref 0.6–1.3)
EOSINOPHIL # BLD AUTO: 0.09 THOUSAND/ÂΜL (ref 0–0.61)
EOSINOPHIL NFR BLD AUTO: 2 % (ref 0–6)
ERYTHROCYTE [DISTWIDTH] IN BLOOD BY AUTOMATED COUNT: 12.3 % (ref 11.6–15.1)
GFR SERPL CREATININE-BSD FRML MDRD: 69 ML/MIN/1.73SQ M
GLUCOSE SERPL-MCNC: 182 MG/DL (ref 65–140)
HCT VFR BLD AUTO: 47.4 % (ref 36.5–49.3)
HGB BLD-MCNC: 16 G/DL (ref 12–17)
IMM GRANULOCYTES # BLD AUTO: 0.01 THOUSAND/UL (ref 0–0.2)
IMM GRANULOCYTES NFR BLD AUTO: 0 % (ref 0–2)
LYMPHOCYTES # BLD AUTO: 1.13 THOUSANDS/ÂΜL (ref 0.6–4.47)
LYMPHOCYTES NFR BLD AUTO: 19 % (ref 14–44)
MCH RBC QN AUTO: 30.9 PG (ref 26.8–34.3)
MCHC RBC AUTO-ENTMCNC: 33.8 G/DL (ref 31.4–37.4)
MCV RBC AUTO: 92 FL (ref 82–98)
MONOCYTES # BLD AUTO: 0.45 THOUSAND/ÂΜL (ref 0.17–1.22)
MONOCYTES NFR BLD AUTO: 8 % (ref 4–12)
NEUTROPHILS # BLD AUTO: 4.21 THOUSANDS/ÂΜL (ref 1.85–7.62)
NEUTS SEG NFR BLD AUTO: 70 % (ref 43–75)
NRBC BLD AUTO-RTO: 0 /100 WBCS
PLATELET # BLD AUTO: 183 THOUSANDS/UL (ref 149–390)
PMV BLD AUTO: 10.2 FL (ref 8.9–12.7)
POTASSIUM SERPL-SCNC: 3.9 MMOL/L (ref 3.5–5.3)
PROT SERPL-MCNC: 6.4 G/DL (ref 6.4–8.4)
RBC # BLD AUTO: 5.17 MILLION/UL (ref 3.88–5.62)
SODIUM SERPL-SCNC: 140 MMOL/L (ref 135–147)
WBC # BLD AUTO: 5.93 THOUSAND/UL (ref 4.31–10.16)

## 2024-12-23 PROCEDURE — 99284 EMERGENCY DEPT VISIT MOD MDM: CPT

## 2024-12-23 PROCEDURE — 85025 COMPLETE CBC W/AUTO DIFF WBC: CPT

## 2024-12-23 PROCEDURE — 99285 EMERGENCY DEPT VISIT HI MDM: CPT

## 2024-12-23 PROCEDURE — 72132 CT LUMBAR SPINE W/DYE: CPT

## 2024-12-23 PROCEDURE — 80053 COMPREHEN METABOLIC PANEL: CPT

## 2024-12-23 PROCEDURE — 36415 COLL VENOUS BLD VENIPUNCTURE: CPT

## 2024-12-23 RX ADMIN — IOHEXOL 100 ML: 350 INJECTION, SOLUTION INTRAVENOUS at 11:05

## 2024-12-23 NOTE — DISCHARGE INSTRUCTIONS
You have been evaluated in the Emergency Department today for ascending peripheral neuropathy. Your evaluation, including blood work and CT of lumbar spine, suggests that your symptoms are not due to any acute findings.  Please follow up with your primary care physician and neurosurgery within two days.  Return to the Emergency Department if you experience progression of neuropathy, weakness, pain, visual disturbances, trouble speaking, falls, or other concerning symptoms.

## 2024-12-23 NOTE — ED PROVIDER NOTES
Time reflects when diagnosis was documented in both MDM as applicable and the Disposition within this note       Time User Action Codes Description Comment    12/23/2024 11:46 AM Farhana Contreras Add [G62.9] Neuropathy           ED Disposition       ED Disposition   Discharge    Condition   Stable    Date/Time   Mon Dec 23, 2024 11:46 AM    Comment   Eugene Rosy discharge to home/self care.                   Assessment & Plan       Medical Decision Making  Patient is a 75 y/o M, with a hx of neuropathy, spinal stenosis, prior lumbar back surgery, GERD, BCC, hypothyroidism, HTN, gout, and arthritis, who presents complaining of ascending neuropathy onset 2 days ago. Patient noticed the progression of his neuropathy upon waking on Saturday which he reports makes it harder to balance when walking. Patient does not normally use any assisted walking devices but has had to use a cane for the last two days. Patient reports lightheadedness intermittently nausea, and chills yesterday and hiccups that resolved PTA. Patient denies any weakness, dizziness, headache, visual disturbances, trouble speaking, facial droop, back pain, CP, SOB, abd pain, recent illnesses, recent vaccination, hx of tick bites, hx of Lyme's ds, or other symptoms at this time.    VSS. Pleasant and cooperative. Normal neurologic exam. 5/5 strength in B/L LE, equal sensation bilaterally.     Ordered Ct scan of lumbar spine, CBC, and CMP which were WNL. No acute findings on imaging. No electrolyte abnormalities. Patient deferred COVID/Flu testing at this time.    Advised to f/u with PCP and neurosurgery. Case discussed with Jacky Gutierres PA-C. Advised patient to return with any new or worsening symptoms including but not limited to  progression of neuropathy, weakness, pain, visual disturbances, trouble speaking, falls, or other concerning symptoms. Discussed assessment and plan with patient who verbalizes understanding and agrees with plan.    Amount  and/or Complexity of Data Reviewed  Labs: ordered. Decision-making details documented in ED Course.  Radiology: ordered.    Risk  Prescription drug management.        ED Course as of 12/23/24 1244   Mon Dec 23, 2024   1014 Patient deferred flu/covid testing   1015 Blood Pressure: 154/77   1015 Temperature: 97.6 °F (36.4 °C)   1015 Pulse: 79   1015 Respirations: 16   1015 SpO2: 96 %   1131 CBC and differential  Normal. No leukocytosis or anemia.   1131 Comprehensive metabolic panel(!)  No electrolyte abnormalities. Normal kidney function and liver enzymes.        Medications   iohexol (OMNIPAQUE) 350 MG/ML injection (MULTI-DOSE) 100 mL (100 mL Intravenous Given 12/23/24 1105)       ED Risk Strat Scores                          SBIRT 20yo+      Flowsheet Row Most Recent Value   Initial Alcohol Screen: US AUDIT-C     3a. Male UNDER 65: How often do you have five or more drinks on one occasion? 0 Filed at: 12/23/2024 0942   Audit-C Score 0 Filed at: 12/23/2024 0942                            History of Present Illness       Chief Complaint   Patient presents with    Numbness     Pt having difficulty walking due to numbness in both legs does have neuropathy. Also has hiccups for 2 days       Past Medical History:   Diagnosis Date    Arthritis     Basal cell carcinoma in situ of skin     on back    Disease of thyroid gland     hypothyroid    GERD (gastroesophageal reflux disease)     Gout     Gout     Hypertension     Neuropathy     right foot    Spinal stenosis, lumbar     Wears dentures     upper full    Wears hearing aid     both ears      Past Surgical History:   Procedure Laterality Date    BACK SURGERY      spinal fusion    CATARACT EXTRACTION Bilateral     COLONOSCOPY      EYE SURGERY Bilateral     retinal dtetachment    JOINT REPLACEMENT      bilateral knee replacement    [place] ARTHRP KNE CONDYLE&PLATU MEDIAL&LAT COMPARTMENTS Left 11/6/2018    Procedure: ARTHROPLASTY KNEE TOTAL;  Surgeon: Ean Almeida MD;  Location:  [facility];  Service: Orthopedics    THYROIDECTOMY, PARTIAL      WRIST SURGERY        History reviewed. No pertinent family history.   Social History     Tobacco Use    Smoking status: Never    Smokeless tobacco: Never   Vaping Use    Vaping status: Never Used   Substance Use Topics    Alcohol use: Yes     Comment: occasional    Drug use: No      E-Cigarette/Vaping    E-Cigarette Use Never User       E-Cigarette/Vaping Substances      I have reviewed and agree with the history as documented.     Patient is a 73 y/o M, with a hx of neuropathy, spinal stenosis, prior lumbar back surgery, GERD, BCC, hypothyroidism, HTN, gout, and arthritis, who presents complaining of ascending neuropathy onset 2 days ago. Patient noticed the progression of his neuropathy upon waking on Saturday which he reports makes it harder to balance when walking. Patient does not normally use any assisted walking devices but has had to use a cane for the last two days. Patient reports lightheadedness intermittently nausea, and chills yesterday and hiccups that resolved PTA. Patient denies any weakness, dizziness, headache, visual disturbances, trouble speaking, facial droop, back pain, CP, SOB, abd pain, recent illnesses, recent vaccination, hx of tick bites, hx of Lyme's ds, or other symptoms at this time.      History provided by:  Patient and spouse   used: No        Review of Systems   Constitutional:  Positive for chills. Negative for fever.   HENT: Negative.  Negative for ear pain, rhinorrhea and sore throat.    Eyes: Negative.  Negative for pain and visual disturbance.   Respiratory: Negative.  Negative for cough and shortness of breath.    Cardiovascular: Negative.  Negative for chest pain and palpitations.   Gastrointestinal:  Positive for nausea. Negative for abdominal pain, diarrhea and vomiting.   Genitourinary: Negative.    Musculoskeletal: Negative.  Negative for back pain.   Skin: Negative.  Negative for color  change and rash.   Neurological:  Positive for light-headedness and numbness. Negative for dizziness, syncope, facial asymmetry, speech difficulty, weakness and headaches.   All other systems reviewed and are negative.          Objective       ED Triage Vitals [12/23/24 0940]   Temperature Pulse Blood Pressure Respirations SpO2 Patient Position - Orthostatic VS   97.8 °F (36.6 °C) 79 156/79 18 97 % Sitting      Temp Source Heart Rate Source BP Location FiO2 (%) Pain Score    Temporal Monitor Left arm -- No Pain      Vitals      Date and Time Temp Pulse SpO2 Resp BP Pain Score FACES Pain Rating User   12/23/24 1130 98 °F (36.7 °C) 61 95 % 16 135/75 No Pain -- CLS   12/23/24 1100 97.5 °F (36.4 °C) 68 98 % 16 170/79 No Pain -- CLS   12/23/24 1030 97.5 °F (36.4 °C) 66 97 % 16 145/69 No Pain -- CLS   12/23/24 1025 -- -- -- -- -- No Pain -- CLS   12/23/24 1000 97.6 °F (36.4 °C) 79 96 % 16 154/77 No Pain -- CLS   12/23/24 0940 97.8 °F (36.6 °C) 79 97 % 18 156/79 No Pain -- KTR            Physical Exam  Vitals and nursing note reviewed.   Constitutional:       General: He is awake. He is not in acute distress.     Appearance: Normal appearance. He is well-developed and well-groomed. He is not ill-appearing, toxic-appearing or diaphoretic.      Comments: Patient is resting comfortably on bed, in no acute distress. Pleasant and cooperative. Wife at bedside.   HENT:      Head: Normocephalic and atraumatic.      Right Ear: Tympanic membrane, ear canal and external ear normal.      Left Ear: Tympanic membrane, ear canal and external ear normal.      Nose: Nose normal.      Mouth/Throat:      Lips: Pink.   Eyes:      General: Lids are normal. No visual field deficit.     Extraocular Movements: Extraocular movements intact.      Conjunctiva/sclera: Conjunctivae normal.      Pupils:      Right eye: Pupil is reactive.      Left eye: Pupil is reactive.   Cardiovascular:      Rate and Rhythm: Normal rate and regular rhythm.      Heart  sounds: Normal heart sounds. No murmur heard.  Pulmonary:      Effort: Pulmonary effort is normal. No tachypnea or respiratory distress.      Breath sounds: Normal breath sounds and air entry. No stridor. No decreased breath sounds, wheezing, rhonchi or rales.   Abdominal:      General: Bowel sounds are normal. There is no distension.      Palpations: Abdomen is soft.      Tenderness: There is no abdominal tenderness. There is no guarding or rebound.   Musculoskeletal:         General: No swelling. Normal range of motion.      Cervical back: Normal and normal range of motion.      Thoracic back: Normal.      Lumbar back: Normal.      Right lower leg: No edema.      Left lower leg: No edema.   Skin:     General: Skin is warm and dry.   Neurological:      Mental Status: He is alert and oriented to person, place, and time.      GCS: GCS eye subscore is 4. GCS verbal subscore is 5. GCS motor subscore is 6.      Cranial Nerves: No cranial nerve deficit, dysarthria or facial asymmetry.      Sensory: Sensation is intact. No sensory deficit.      Motor: Motor function is intact. No weakness or pronator drift.      Coordination: Coordination is intact. Finger-Nose-Finger Test normal.   Psychiatric:         Attention and Perception: Attention normal.         Mood and Affect: Mood normal.         Speech: Speech normal.         Behavior: Behavior normal. Behavior is cooperative.         Results Reviewed       Procedure Component Value Units Date/Time    Comprehensive metabolic panel [[id number]]  (Abnormal) Collected: 12/23/24 1018    Lab Status: Final result Specimen: Blood from Arm, Right Updated: 12/23/24 1040     Sodium 140 mmol/L      Potassium 3.9 mmol/L      Chloride 103 mmol/L      CO2 27 mmol/L      ANION GAP 10 mmol/L      BUN 12 mg/dL      Creatinine 1.04 mg/dL      Glucose 182 mg/dL      Calcium 9.2 mg/dL      AST 12 U/L      ALT 10 U/L      Alkaline Phosphatase 49 U/L      Total Protein 6.4 g/dL      Albumin 4.0  g/dL      Total Bilirubin 0.97 mg/dL      eGFR 69 ml/min/1.73sq m     Narrative:      [employer] Kid[employer] guidelines for Chronic Kidney Disease (CKD):     Stage 1 with normal or high GFR (GFR > 90 mL/min/1.73 square meters)    Stage 2 Mild CKD (GFR = 60-89 mL/min/1.73 square meters)    Stage 3A Moderate CKD (GFR = 45-59 mL/min/1.73 square meters)    Stage 3B Moderate CKD (GFR = 30-44 mL/min/1.73 square meters)    Stage 4 Severe CKD (GFR = 15-29 mL/min/1.73 square meters)    Stage 5 End Stage CKD (GFR <15 mL/min/1.73 square meters)  Note: GFR calculation is accurate only with a steady state creatinine    CBC and differential [[id number]] Collected: 12/23/24 1018    Lab Status: Final result Specimen: Blood from Arm, Right Updated: 12/23/24 1023     WBC 5.93 Thousand/uL      RBC 5.17 Million/uL      Hemoglobin 16.0 g/dL      Hematocrit 47.4 %      MCV 92 fL      MCH 30.9 pg      MCHC 33.8 g/dL      RDW 12.3 %      MPV 10.2 fL      Platelets 183 Thousands/uL      nRBC 0 /100 WBCs      Segmented % 70 %      Immature Grans % 0 %      Lymphocytes % 19 %      Monocytes % 8 %      Eosinophils Relative 2 %      Basophils Relative 1 %      Absolute Neutrophils 4.21 Thousands/µL      Absolute Immature Grans 0.01 Thousand/uL      Absolute Lymphocytes 1.13 Thousands/µL      Absolute Monocytes 0.45 Thousand/µL      Eosinophils Absolute 0.09 Thousand/µL      Basophils Absolute 0.04 Thousands/µL             CT spine lumbar w contrast   Final Interpretation by Paco Gonzalez DO (12/23 1130)      Postoperative change as described above with fusion from L3-S1 including bone graft at the L4-5 and L5-S1 levels where there is been previous laminectomy. Residual canal stenosis and foraminal narrowing worst at the L3-4 level.      Lucency surrounding the left pedicle screw in the L3 vertebral body which may represent sequela of loosening.      Workstation performed: [id number]             Procedures    ED Medication  and Procedure Management   Prior to Admission Medications   Prescriptions Last Dose Informant Patient Reported? Taking?   Levothyroxine Sodium 25 MCG CAPS   Yes No   Sig: Take 1 tablet by mouth daily   allopurinol (ZYLOPRIM) 100 mg tablet   Yes No   Sig: Take 1 tablet by mouth daily as directed for gout prevention.   cephalexin (KEFLEX) 500 mg capsule   Yes No   Sig: cephalexin 500 mg capsule   Take 4 capsules (500 mg) 1 hour prior to dental procedure   Patient not taking: Reported on 12/16/2024   colchicine (COLCRYS) 0.6 mg tablet   No No   Sig: Take 1 tablet by mouth daily for 30 days   Patient taking differently: Take 0.6 mg by mouth daily as needed   cyclobenzaprine (FLEXERIL) 10 mg tablet   Yes No   Sig: Take 10 mg by mouth if needed   famotidine (PEPCID) 40 MG tablet   Yes No   Sig: Take 40 mg by mouth if needed   levothyroxine 25 mcg tablet   Yes No   Sig: TAKE 1 TABLET BY MOUTH ONCE DAILY FIRST THING IN THE MORNING AT LEAST 30 MINUTES PRIOR TO BREAKFAST OR OTHER MEDS.   lisinopril (ZESTRIL) 10 mg tablet   Yes No   Sig: Take 10 mg by mouth daily   metFORMIN (GLUCOPHAGE-XR) 500 mg 24 hr tablet   Yes No   Sig: Take 500 mg by mouth daily with breakfast      Facility-Administered Medications: None     Discharge Medication List as of 12/23/2024 11:53 AM        CONTINUE these medications which have NOT CHANGED    Details   allopurinol (ZYLOPRIM) 100 mg tablet Take 1 tablet by mouth daily as directed for gout prevention., Historical Med      cephalexin (KEFLEX) 500 mg capsule cephalexin 500 mg capsule   Take 4 capsules (500 mg) 1 hour prior to dental procedure, Historical Med      colchicine (COLCRYS) 0.6 mg tablet Take 1 tablet by mouth daily for 30 days, Starting Fri 3/3/2017, Until Fri 6/7/2024, Print      cyclobenzaprine (FLEXERIL) 10 mg tablet Take 10 mg by mouth if needed, Starting Wed 1/4/2023, Historical Med      famotidine (PEPCID) 40 MG tablet Take 40 mg by mouth if needed, Historical Med       levothyroxine 25 mcg tablet TAKE 1 TABLET BY MOUTH ONCE DAILY FIRST THING IN THE MORNING AT LEAST 30 MINUTES PRIOR TO BREAKFAST OR OTHER MEDS., Historical Med      Levothyroxine Sodium 25 MCG CAPS Take 1 tablet by mouth daily, Until Discontinued, Historical Med      lisinopril (ZESTRIL) 10 mg tablet Take 10 mg by mouth daily, Until Discontinued, Historical Med      metFORMIN (GLUCOPHAGE-XR) 500 mg 24 hr tablet Take 500 mg by mouth daily with breakfast, Starting Tue 12/27/2022, Historical Med           No discharge procedures on file.  ED SEPSIS DOCUMENTATION   Time reflects when diagnosis was documented in both MDM as applicable and the Disposition within this note       Time User Action Codes Description Comment    12/23/2024 11:46 AM Farhana Contreras Add [G62.9] Neuropathy                  Farhana Contreras PA-C  12/23/24 1244

## 2025-01-17 ENCOUNTER — OFFICE VISIT (OUTPATIENT)
Dept: URGENT CARE | Facility: MEDICAL CENTER | Age: 75
End: 2025-01-17
Payer: MEDICARE

## 2025-01-17 VITALS
HEART RATE: 96 BPM | TEMPERATURE: 97.2 F | SYSTOLIC BLOOD PRESSURE: 140 MMHG | OXYGEN SATURATION: 96 % | DIASTOLIC BLOOD PRESSURE: 80 MMHG | RESPIRATION RATE: 16 BRPM

## 2025-01-17 DIAGNOSIS — S16.1XXA STRAIN OF MUSCLE, FASCIA AND TENDON AT NECK LEVEL, INITIAL ENCOUNTER: Primary | ICD-10-CM

## 2025-01-17 PROCEDURE — G0463 HOSPITAL OUTPT CLINIC VISIT: HCPCS

## 2025-01-17 PROCEDURE — 99203 OFFICE O/P NEW LOW 30 MIN: CPT

## 2025-01-17 RX ORDER — PREGABALIN 50 MG/1
CAPSULE ORAL
COMMUNITY
Start: 2025-01-02

## 2025-01-17 RX ORDER — BACLOFEN 10 MG/1
10 TABLET ORAL
COMMUNITY
Start: 2025-01-15 | End: 2025-01-18

## 2025-01-17 RX ORDER — METHYLPREDNISOLONE 4 MG/1
TABLET ORAL
Qty: 1 EACH | Refills: 0 | Status: SHIPPED | OUTPATIENT
Start: 2025-01-17

## 2025-01-18 ENCOUNTER — HOSPITAL ENCOUNTER (EMERGENCY)
Facility: HOSPITAL | Age: 75
Discharge: HOME | End: 2025-01-18
Attending: EMERGENCY MEDICINE
Payer: MEDICARE

## 2025-01-18 VITALS
HEART RATE: 67 BPM | OXYGEN SATURATION: 96 % | TEMPERATURE: 97.2 F | SYSTOLIC BLOOD PRESSURE: 176 MMHG | DIASTOLIC BLOOD PRESSURE: 91 MMHG | RESPIRATION RATE: 16 BRPM

## 2025-01-18 DIAGNOSIS — M54.2 TRIGGER POINT OF NECK: ICD-10-CM

## 2025-01-18 DIAGNOSIS — M62.838 NECK MUSCLE SPASM: Primary | ICD-10-CM

## 2025-01-18 PROCEDURE — 99283 EMERGENCY DEPT VISIT LOW MDM: CPT

## 2025-01-18 PROCEDURE — 20553 NJX 1/MLT TRIGGER POINTS 3/>: CPT | Performed by: EMERGENCY MEDICINE

## 2025-01-18 PROCEDURE — 96372 THER/PROPH/DIAG INJ SC/IM: CPT

## 2025-01-18 PROCEDURE — 99284 EMERGENCY DEPT VISIT MOD MDM: CPT | Performed by: EMERGENCY MEDICINE

## 2025-01-18 RX ORDER — TRIAMCINOLONE ACETONIDE 40 MG/ML
40 INJECTION, SUSPENSION INTRA-ARTICULAR; INTRAMUSCULAR ONCE
Status: COMPLETED | OUTPATIENT
Start: 2025-01-18 | End: 2025-01-18

## 2025-01-18 RX ORDER — LIDOCAINE HYDROCHLORIDE 10 MG/ML
5 INJECTION, SOLUTION EPIDURAL; INFILTRATION; INTRACAUDAL; PERINEURAL ONCE
Status: COMPLETED | OUTPATIENT
Start: 2025-01-18 | End: 2025-01-18

## 2025-01-18 RX ORDER — BUPIVACAINE HYDROCHLORIDE 5 MG/ML
10 INJECTION, SOLUTION EPIDURAL; INTRACAUDAL ONCE
Status: COMPLETED | OUTPATIENT
Start: 2025-01-18 | End: 2025-01-18

## 2025-01-18 RX ORDER — CYCLOBENZAPRINE HCL 10 MG
10 TABLET ORAL ONCE
Status: COMPLETED | OUTPATIENT
Start: 2025-01-18 | End: 2025-01-18

## 2025-01-18 RX ORDER — CYCLOBENZAPRINE HCL 10 MG
10 TABLET ORAL 2 TIMES DAILY PRN
Qty: 20 TABLET | Refills: 0 | Status: SHIPPED | OUTPATIENT
Start: 2025-01-18

## 2025-01-18 RX ADMIN — BUPIVACAINE HYDROCHLORIDE 10 ML: 5 INJECTION, SOLUTION EPIDURAL; INTRACAUDAL; PERINEURAL at 08:26

## 2025-01-18 RX ADMIN — CYCLOBENZAPRINE 10 MG: 10 TABLET, FILM COATED ORAL at 08:28

## 2025-01-18 RX ADMIN — LIDOCAINE HYDROCHLORIDE 5 ML: 10 INJECTION, SOLUTION EPIDURAL; INFILTRATION; INTRACAUDAL; PERINEURAL at 08:26

## 2025-01-18 RX ADMIN — TRIAMCINOLONE ACETONIDE 40 MG: 40 INJECTION, SUSPENSION INTRA-ARTICULAR; INTRAMUSCULAR at 08:26

## 2025-01-18 NOTE — DISCHARGE INSTRUCTIONS
You should stop taking the baclofen.  You can start taking Flexeril for neck spasm.    You can perform whatever other activity you are able to tolerate.    Please continue all your other medications at current doses and frequencies.    If you are not getting better after taking the steroid and Flexeril, you should be examined again to be sure that there is not a different cause of your symptoms

## 2025-01-18 NOTE — ED PROVIDER NOTES
"  ED Disposition       None          Assessment & Plan       Medical Decision Making  Risk  Prescription drug management.             Medications - No data to display    ED Risk Strat Scores                          SBIRT 20yo+      Flowsheet Row Most Recent Value   Initial Alcohol Screen: US AUDIT-C     1. How often do you have a drink containing alcohol? 0 Filed at: 01/18/2025 0751   2. How many drinks containing alcohol do you have on a typical day you are drinking?  0 Filed at: 01/18/2025 0751   3a. Male UNDER 65: How often do you have five or more drinks on one occasion? 0 Filed at: 01/18/2025 0751   3b. FEMALE Any Age, or MALE 65+: How often do you have 4 or more drinks on one occassion? 0 Filed at: 01/18/2025 0751   Audit-C Score 0 Filed at: 01/18/2025 0751   SMITA: How many times in the past year have you...    Used an illegal drug or used a prescription medication for non-medical reasons? Never Filed at: 01/18/2025 0751                            History of Present Illness       Chief Complaint   Patient presents with    Neck Pain     Patient complains of right sided neck pain that started about a week ago that radiates into his head and describes it as a \"spasm\". Patient went to urgent care yesterday and prescribed a steroid which he did not start yet. Patient denies any injury to the area. Pain 9/10 on arrival.        Past Medical History:   Diagnosis Date    Arthritis     Basal cell carcinoma in situ of skin     on back    Disease of thyroid gland     hypothyroid    GERD (gastroesophageal reflux disease)     Gout     Gout     Hypertension     Neuropathy     right foot    Spinal stenosis, lumbar     Wears dentures     upper full    Wears hearing aid     both ears      Past Surgical History:   Procedure Laterality Date    BACK SURGERY      spinal fusion    CATARACT EXTRACTION Bilateral     COLONOSCOPY      EYE SURGERY Bilateral     retinal dtetachment    JOINT REPLACEMENT      bilateral knee replacement    " [place] ARTHRP KNE CONDYLE&PLATU MEDIAL&LAT COMPARTMENTS Left 11/6/2018    Procedure: ARTHROPLASTY KNEE TOTAL;  Surgeon: Ean Almeida MD;  Location: [facility];  Service: Orthopedics    THYROIDECTOMY, PARTIAL      WRIST SURGERY        History reviewed. No pertinent family history.   Social History     Tobacco Use    Smoking status: Never    Smokeless tobacco: Never   Vaping Use    Vaping status: Never Used   Substance Use Topics    Alcohol use: Yes     Comment: occasional    Drug use: No      E-Cigarette/Vaping    E-Cigarette Use Never User       E-Cigarette/Vaping Substances      I have reviewed and agree with the history as documented.     74 year old with prediabetes and gout, controlled here for acute neck pain for 1 week, PCP prescribed baclofen, no relief, went to urgent care, got prescribed steroids last night, unable to pick it up due to pharmacy closed yesterday. Pain localized at right back of neck with associated shooting pain and tingling to the forehead. Deny loss of strength and sensation.     Performed trigger point injection in ED with rapid improvement in the symptom, prescribed flexeril and encouraged to  the steroid prescribed and follow up with pcp for symptom review.       History provided by:  Patient and significant other   used: No    Neck Pain  Pain location:  R side  Quality:  Stabbing and shooting  Radiates to: right face.  Pain severity:  Severe  Pain is:  Same all the time  Onset quality:  Sudden  Timing:  Constant  Progression:  Worsening  Chronicity:  New  Context: not fall, not jumping from heights, not lifting a heavy object, not MCA, not MVC, not pedestrian accident and not recent injury    Worsened by:  Position  Ineffective treatments:  Muscle relaxants, heat and NSAIDs  Associated symptoms: numbness (at neck) and tingling    Associated symptoms: no bladder incontinence, no bowel incontinence, no chest pain, no fever, no headaches, no paresis, no photophobia,  no syncope, no weakness and no weight loss    Risk factors: no hx of osteoporosis, no hx of spinal trauma, no recent head injury and no recurrent falls        Review of Systems   Constitutional:  Negative for chills, fever and weight loss.   HENT:  Negative for ear pain and sore throat.    Eyes:  Negative for photophobia, pain and visual disturbance.   Respiratory:  Negative for cough and shortness of breath.    Cardiovascular:  Negative for chest pain, palpitations and syncope.   Gastrointestinal:  Negative for abdominal pain, bowel incontinence and vomiting.   Genitourinary:  Negative for bladder incontinence, dysuria and hematuria.   Musculoskeletal:  Positive for arthralgias and neck pain. Negative for back pain.   Skin:  Negative for color change and rash.   Neurological:  Positive for tingling and numbness (at neck). Negative for seizures, syncope, weakness and headaches.   All other systems reviewed and are negative.          Objective       ED Triage Vitals [01/18/25 0751]   Temperature Pulse Blood Pressure Respirations SpO2 Patient Position - Orthostatic VS   (!) 97.2 °F (36.2 °C) 76 (!) 205/97 18 97 % --      Temp Source Heart Rate Source BP Location FiO2 (%) Pain Score    Temporal Monitor -- -- 9      Vitals      Date and Time Temp Pulse SpO2 Resp BP Pain Score FACES Pain Rating User   01/18/25 0751 97.2 °F (36.2 °C) 76 97 % 18 205/97 9 -- CK            Physical Exam  Vitals and nursing note reviewed.   Constitutional:       General: He is not in acute distress.     Appearance: He is well-developed.   HENT:      Head: Normocephalic and atraumatic.   Eyes:      Conjunctiva/sclera: Conjunctivae normal.   Cardiovascular:      Rate and Rhythm: Normal rate and regular rhythm.   Pulmonary:      Effort: Pulmonary effort is normal. No respiratory distress.      Breath sounds: Normal breath sounds.   Abdominal:      General: Abdomen is flat. There is no distension.   Musculoskeletal:         General: Tenderness  (right neck) present. No swelling, deformity or signs of injury. Normal range of motion.      Cervical back: Neck supple.   Skin:     General: Skin is warm and dry.   Neurological:      General: No focal deficit present.      Mental Status: He is alert.   Psychiatric:         Mood and Affect: Mood normal.         Results Reviewed       None            No orders to display       Trigger Injection 1 or 2 muscles    Date/Time: 1/18/2025 8:51 AM    Performed by: Mauricio Braun DO  Authorized by: Mauricio Braun DO    Patient location:  ED  Other Assisting Provider: No    Consent:     Consent obtained:  Verbal    Consent given by:  Patient    Risks discussed:  Unsuccessful block    Alternatives discussed:  Alternative treatment  Universal protocol:     Procedure explained and questions answered to patient or proxy's satisfaction: yes      Relevant documents present and verified: yes      Required blood products, implants, devices, and special equipment available: yes      Site marked: yes      Time out called: yes      Patient identity confirmed:  Verbally with patient  Location:     Nerve block body site: Neck.    Injection Trigger Points:  2  Pre-procedure details:     Skin Preparation:  Alchohol pad  Skin anesthesia:     Skin anesthesia method:  Local infiltration    Local anesthetic:  Lidocaine 1% w/o epi and bupivacaine 0.5% w/o epi  Procedure details:     Needle gauge:  25 G    Anesthetic injected:  Bupivacaine 0.5% w/o epi    Steroid injected:  Triamcinolone    Additive injected:  None  Post-procedure details:     Dressing:  None    Outcome:  Pain relieved    Patient tolerance of procedure:  Tolerated well, no immediate complications      ED Medication and Procedure Management   Prior to Admission Medications   Prescriptions Last Dose Informant Patient Reported? Taking?   Levothyroxine Sodium 25 MCG CAPS   Yes No   Sig: Take 1 tablet by mouth daily   Lyrica 50 MG capsule   Yes No   Sig: Take 1 capsule every day  by oral route at bedtime.   allopurinol (ZYLOPRIM) 100 mg tablet   Yes No   Sig: Take 1 tablet by mouth daily as directed for gout prevention.   baclofen 10 mg tablet   Yes No   Sig: Take 10 mg by mouth   cephalexin (KEFLEX) 500 mg capsule   Yes No   colchicine (COLCRYS) 0.6 mg tablet   No No   Sig: Take 1 tablet by mouth daily for 30 days   Patient taking differently: Take 0.6 mg by mouth daily as needed   cyclobenzaprine (FLEXERIL) 10 mg tablet   Yes No   Sig: Take 10 mg by mouth if needed   famotidine (PEPCID) 40 MG tablet   Yes No   Sig: Take 40 mg by mouth if needed   levothyroxine 25 mcg tablet   Yes No   Sig: TAKE 1 TABLET BY MOUTH ONCE DAILY FIRST THING IN THE MORNING AT LEAST 30 MINUTES PRIOR TO BREAKFAST OR OTHER MEDS.   lisinopril (ZESTRIL) 10 mg tablet   Yes No   Sig: Take 10 mg by mouth daily   metFORMIN (GLUCOPHAGE-XR) 500 mg 24 hr tablet   Yes No   Sig: Take 500 mg by mouth daily with breakfast   methylPREDNISolone 4 MG tablet therapy pack   No No   Sig: Use as directed on package      Facility-Administered Medications: None     Patient's Medications   Discharge Prescriptions    No medications on file     No discharge procedures on file.  ED SEPSIS DOCUMENTATION            Kristan Javed DO  01/18/25 0992

## 2025-01-18 NOTE — PATIENT INSTRUCTIONS
Recommend ice and heat as needed.  May use topical analgesics such as IcyHot for relief.  Tylenol/ibuprofen as needed for pain relief

## 2025-01-18 NOTE — PROGRESS NOTES
[facility]        NAME: Eugene Gallegos is a 74 y.o. male  : 1950    MRN: [id number]  DATE: 2025  TIME: 7:38 PM    Assessment and Plan   Strain of muscle, fascia and tendon at neck level, initial encounter [S16.1XXA]  1. Strain of muscle, fascia and tendon at neck level, initial encounter  methylPREDNISolone 4 MG tablet therapy pack        Patient follows with pain and spine at [facility].  Recommended following up with them in regards to neck pain or with his PCP for potential need for physical therapy.    Patient Instructions     Patient Instructions   Recommend ice and heat as needed.  May use topical analgesics such as IcyHot for relief.  Tylenol/ibuprofen as needed for pain relief          Follow up with PCP in 3-5 days.  Proceed to  ER if symptoms worsen.    Chief Complaint     Chief Complaint   Patient presents with    Pain     Pt c/o neck pain for over a week, sensitive to the touch on the head. PCP ordered a muscle relaxer and that didn't work- taken yesterday and today         History of Present Illness       74-year-old male with PMH of prior back surgery and prediabetes presenting with neck pain x 1 week.  Patient reports he was called in baclofen by his primary care physician and took for 2 days without any improvement.  Patient feels like his neck is tight with limited range of motion on his right side.  Patient reports increased sensitivity on his scalp stating it is not tender however he can just feel a pins-and-needles along his neck and top of his head.  Denies any decreased sensation.  Denies any bladder or bowel incontinence.  Denies any injury or trauma to his neck, just reports he began to notice the pain 1 morning a week ago.  Patient reports taking Tylenol/ibuprofen, using heating pads and massaging the area with temporary relief however still has persistent pain.        Review of Systems   Review of Systems   Constitutional:  Negative for chills, fatigue, fever and  unexpected weight change.   HENT:  Negative for congestion and sore throat.    Respiratory:  Negative for cough and shortness of breath.    Cardiovascular:  Negative for chest pain and palpitations.   Gastrointestinal:  Negative for diarrhea, nausea and vomiting.   Musculoskeletal:  Positive for neck pain. Negative for arthralgias and myalgias.   Neurological:  Negative for light-headedness, numbness and headaches.         Current Medications       Current Outpatient Medications:     allopurinol (ZYLOPRIM) 100 mg tablet, Take 1 tablet by mouth daily as directed for gout prevention., Disp: , Rfl:     baclofen 10 mg tablet, Take 10 mg by mouth, Disp: , Rfl:     levothyroxine 25 mcg tablet, TAKE 1 TABLET BY MOUTH ONCE DAILY FIRST THING IN THE MORNING AT LEAST 30 MINUTES PRIOR TO BREAKFAST OR OTHER MEDS., Disp: , Rfl:     Levothyroxine Sodium 25 MCG CAPS, Take 1 tablet by mouth daily, Disp: , Rfl:     lisinopril (ZESTRIL) 10 mg tablet, Take 10 mg by mouth daily, Disp: , Rfl:     Lyrica 50 MG capsule, Take 1 capsule every day by oral route at bedtime., Disp: , Rfl:     metFORMIN (GLUCOPHAGE-XR) 500 mg 24 hr tablet, Take 500 mg by mouth daily with breakfast, Disp: , Rfl:     methylPREDNISolone 4 MG tablet therapy pack, Use as directed on package, Disp: 1 each, Rfl: 0    cephalexin (KEFLEX) 500 mg capsule, , Disp: , Rfl:     colchicine (COLCRYS) 0.6 mg tablet, Take 1 tablet by mouth daily for 30 days (Patient taking differently: Take 0.6 mg by mouth daily as needed), Disp: 30 tablet, Rfl: 0    cyclobenzaprine (FLEXERIL) 10 mg tablet, Take 10 mg by mouth if needed, Disp: , Rfl:     famotidine (PEPCID) 40 MG tablet, Take 40 mg by mouth if needed, Disp: , Rfl:     Current Allergies     Allergies as of 01/17/2025 - Reviewed 01/17/2025   Allergen Reaction Noted    Aspirin Hives 03/03/2017    Diclofenac sodium Hives 05/24/2012    Diclofenac sodium Hives 05/24/2012            The following portions of the patient's history were  reviewed and updated as appropriate: allergies, current medications, past family history, past medical history, past social history, past surgical history and problem list.     Past Medical History:   Diagnosis Date    Arthritis     Basal cell carcinoma in situ of skin     on back    Disease of thyroid gland     hypothyroid    GERD (gastroesophageal reflux disease)     Gout     Gout     Hypertension     Neuropathy     right foot    Spinal stenosis, lumbar     Wears dentures     upper full    Wears hearing aid     both ears       Past Surgical History:   Procedure Laterality Date    BACK SURGERY      spinal fusion    CATARACT EXTRACTION Bilateral     COLONOSCOPY      EYE SURGERY Bilateral     retinal dtetachment    JOINT REPLACEMENT      bilateral knee replacement    AR ARTHRP KNE CONDYLE&PLATU MEDIAL&LAT COMPARTMENTS Left 11/6/2018    Procedure: ARTHROPLASTY KNEE TOTAL;  Surgeon: Ean Almeida MD;  Location: [facility];  Service: Orthopedics    THYROIDECTOMY, PARTIAL      WRIST SURGERY         History reviewed. No pertinent family history.      Medications have been verified.        Objective   /80   Pulse 96   Temp (!) 97.2 °F (36.2 °C) (Temporal)   Resp 16   SpO2 96%        Physical Exam     Physical Exam  Vitals and nursing note reviewed.   Constitutional:       General: He is not in acute distress.     Appearance: He is normal weight.   HENT:      Head: Normocephalic and atraumatic.      Right Ear: External ear normal.      Left Ear: External ear normal.      Nose: Nose normal.      Mouth/Throat:      Mouth: Mucous membranes are moist.      Pharynx: Oropharynx is clear.   Eyes:      Conjunctiva/sclera: Conjunctivae normal.      Pupils: Pupils are equal, round, and reactive to light.   Cardiovascular:      Rate and Rhythm: Normal rate and regular rhythm.      Pulses: Normal pulses.   Pulmonary:      Effort: Pulmonary effort is normal.   Musculoskeletal:      Comments: No midline cervical spine  tenderness.  Nontender scalp line.  Decreased neck range of motion with right-sided lateral flexion and rotation.  Tenderness to palpation along right upper trapezius and levator Scap/scalene muscles on right side.  Patient neck movement.   Skin:     Capillary Refill: Capillary refill takes less than 2 seconds.   Neurological:      General: No focal deficit present.      Mental Status: He is alert and oriented to person, place, and time.      Sensory: No sensory deficit.      Motor: No weakness.      Coordination: Coordination normal.      Gait: Gait normal.   Psychiatric:         Mood and Affect: Mood normal.         Behavior: Behavior normal.

## 2025-03-03 NOTE — PROGRESS NOTES
Daily Note     Today's date: 2019  Patient name: Cyril Mejia  : 1950  MRN: [id number]  Referring provider: Ivana Eisenberg PA-C  Dx:   Encounter Diagnosis     ICD-10-CM    1  Aftercare following right knee joint replacement surgery Z47 1     [id number]                   Subjective: Patient reports he has been a little sore on the lateral aspect of the knee  Objective: See treatment diary below      Assessment: Tolerated treatment well  Patient exhibited good technique with therapeutic exercises      Plan: Continue per plan of care        Precautions: None    Daily Treatment Diary     Manual      PROM RK RK LF LF LF    Distal Quad STM RK RK                Exercise Diary          QS  2/10 2/10 2/10 20X    SLR         SAQ  2# 2/10 2# 2/10 2# 2/15 2# 2/10    Heel slides with strap  2# 20X 2# 2/10 2# 20X 2# 20X    T-Band Press  2# L4 20X 2# L4 2/10 2# L4 2/10 2# L4 20X    T-Band Hamstring L3 2/15 L3 2/15 L4 2/10 L4 2/15 L4 2/15    T-Band TKE L3 2/15 L3 2/15 L4 2/10 L4 20X L4 20X    LAQ 2# 2/15 2# 2/15 2# 2/15 2# 2/15 2# 2/15    Mini Squats         Steps Forward         Steps Lateral   4" 2/10 4" 2/10 4" 20X    PYR HAM:   S , P , C         PYR QUAD:   S , P , C         Leg Press: S         XO TKE         Hack Squat         SLS         NuStep:   S , A         Bike: S         Treadmill                  Modalities          CP 15'  15' 15' 15'
[FreeTextEntry1] : Spirometry February 28, 2025. Mild obstructive ventilatory impairment. Increased flow rates compared to November 2024.  
[FreeTextEntry1] : Spirometry February 28, 2025. Mild obstructive ventilatory impairment. Increased flow rates compared to November 2024.

## (undated) DEVICE — WEBRIL 6 IN UNSTERILE

## (undated) DEVICE — 3M™ DURAPORE™ SURGICAL TAPE 1538-3, 3 INCH X 10 YARD (7,5CM X 9,1M), 4 ROLLS/BOX: Brand: 3M™ DURAPORE™

## (undated) DEVICE — 3M™ IOBAN™ 2 ANTIMICROBIAL INCISE DRAPE 6648EZ: Brand: IOBAN™ 2

## (undated) DEVICE — SAW BLADE OSCILLATING BRAZOL 167

## (undated) DEVICE — GLOVE SRG BIOGEL 8

## (undated) DEVICE — SKIN MARKER DUAL TIP WITH RULER CAP, FLEXIBLE RULER AND LABELS: Brand: DEVON

## (undated) DEVICE — PENCIL ELECTROSURG E-Z CLEAN -0035H

## (undated) DEVICE — CAPIT KNEE ATTUNE RP CEMENT - DEPUY

## (undated) DEVICE — GLOVE SRG BIOGEL 6.5

## (undated) DEVICE — SURGICAL GOWN, XL SMARTSLEEVE: Brand: CONVERTORS

## (undated) DEVICE — CEMENT MIXING BOWL W/SPATULA

## (undated) DEVICE — SPONGE LAP 18 X 18 IN

## (undated) DEVICE — SUT MONOCRYL 3-0 PS-2 27 IN Y427H

## (undated) DEVICE — TRAY FOLEY 16FR URIMETER SURESTEP

## (undated) DEVICE — CURETTE QUIK-USE F/ BN PREP

## (undated) DEVICE — GLOVE INDICATOR PI UNDERGLOVE SZ 7 BLUE

## (undated) DEVICE — 3M™ STERI-DRAPE™ U-DRAPE 1015: Brand: STERI-DRAPE™

## (undated) DEVICE — SCD SEQUENTIAL COMPRESSION COMFORT SLEEVE MEDIUM KNEE LENGTH: Brand: KENDALL SCD

## (undated) DEVICE — SUT VICRYL 2-0 CT-1 36 IN J945H

## (undated) DEVICE — INTENDED FOR TISSUE SEPARATION, AND OTHER PROCEDURES THAT REQUIRE A SHARP SURGICAL BLADE TO PUNCTURE OR CUT.: Brand: BARD-PARKER ® CARBON RIB-BACK BLADES

## (undated) DEVICE — FRAZIER SUCTION INSTRUMENT 18 FR W/OBTURATOR, NO CONTROL VENT: Brand: FRAZIER

## (undated) DEVICE — CUFF TOURNIQUET 30 X 4 IN QUICK CONNECT DISP 1BLA

## (undated) DEVICE — SUT VICRYL 1 CTX 36 IN J977H

## (undated) DEVICE — PADDING CAST 6IN COTTON STRL

## (undated) DEVICE — COOL TEMP PAD

## (undated) DEVICE — BETHLEHEM UNIV TOTAL KNEE, KIT: Brand: CARDINAL HEALTH

## (undated) DEVICE — 3000CC GUARDIAN II: Brand: GUARDIAN

## (undated) DEVICE — THE SIMPULSE SOLO SYSTEM WITH ULTREX RETRACTABLE SPLASH SHIELD TIP: Brand: SIMPULSE SOLO

## (undated) DEVICE — CHLORAPREP HI-LITE 26ML ORANGE

## (undated) DEVICE — PREP SURGICAL PURPREP 26ML

## (undated) DEVICE — GLOVE INDICATOR PI UNDERGLOVE SZ 8 BLUE

## (undated) DEVICE — SAW BLADE RECIPROCATING 179

## (undated) DEVICE — 3M™ STERI-STRIP™ REINFORCED ADHESIVE SKIN CLOSURES, R1547, 1/2 IN X 4 IN (12 MM X 100 MM), 6 STRIPS/ENVELOPE: Brand: 3M™ STERI-STRIP™

## (undated) DEVICE — IMPERVIOUS STOCKINETTE: Brand: DEROYAL

## (undated) DEVICE — COBAN 6 IN STERILE

## (undated) DEVICE — OCCLUSIVE GAUZE STRIP,3% BISMUTH TRIBROMOPHENATE IN PETROLATUM BLEND: Brand: XEROFORM

## (undated) DEVICE — HOOD: Brand: FLYTE